# Patient Record
Sex: MALE | Race: WHITE | Employment: FULL TIME | URBAN - METROPOLITAN AREA
[De-identification: names, ages, dates, MRNs, and addresses within clinical notes are randomized per-mention and may not be internally consistent; named-entity substitution may affect disease eponyms.]

---

## 2017-03-06 LAB
ANTIBODY: NORMAL
HBA1C MFR BLD: 8 %

## 2017-03-13 ENCOUNTER — OFFICE VISIT (OUTPATIENT)
Dept: FAMILY MEDICINE CLINIC | Age: 57
End: 2017-03-13

## 2017-03-13 VITALS
TEMPERATURE: 97.8 F | WEIGHT: 306.2 LBS | RESPIRATION RATE: 20 BRPM | BODY MASS INDEX: 39.85 KG/M2 | DIASTOLIC BLOOD PRESSURE: 80 MMHG | HEART RATE: 64 BPM | SYSTOLIC BLOOD PRESSURE: 134 MMHG

## 2017-03-13 DIAGNOSIS — E11.9 TYPE 2 DIABETES MELLITUS WITHOUT COMPLICATION, UNSPECIFIED LONG TERM INSULIN USE STATUS: Primary | Chronic | ICD-10-CM

## 2017-03-13 DIAGNOSIS — Z12.11 SCREEN FOR COLON CANCER: ICD-10-CM

## 2017-03-13 DIAGNOSIS — I10 ESSENTIAL HYPERTENSION: ICD-10-CM

## 2017-03-13 DIAGNOSIS — Z12.5 SCREENING PSA (PROSTATE SPECIFIC ANTIGEN): ICD-10-CM

## 2017-03-13 DIAGNOSIS — E78.5 HYPERLIPIDEMIA, UNSPECIFIED HYPERLIPIDEMIA TYPE: Chronic | ICD-10-CM

## 2017-03-13 DIAGNOSIS — M54.6 ACUTE RIGHT-SIDED THORACIC BACK PAIN: ICD-10-CM

## 2017-03-13 PROCEDURE — G8417 CALC BMI ABV UP PARAM F/U: HCPCS | Performed by: FAMILY MEDICINE

## 2017-03-13 PROCEDURE — 99214 OFFICE O/P EST MOD 30 MIN: CPT | Performed by: FAMILY MEDICINE

## 2017-03-13 PROCEDURE — 3045F PR MOST RECENT HEMOGLOBIN A1C LEVEL 7.0-9.0%: CPT | Performed by: FAMILY MEDICINE

## 2017-03-13 PROCEDURE — G8427 DOCREV CUR MEDS BY ELIG CLIN: HCPCS | Performed by: FAMILY MEDICINE

## 2017-03-13 PROCEDURE — 1036F TOBACCO NON-USER: CPT | Performed by: FAMILY MEDICINE

## 2017-03-13 PROCEDURE — 3017F COLORECTAL CA SCREEN DOC REV: CPT | Performed by: FAMILY MEDICINE

## 2017-03-13 PROCEDURE — G8484 FLU IMMUNIZE NO ADMIN: HCPCS | Performed by: FAMILY MEDICINE

## 2017-03-13 ASSESSMENT — ENCOUNTER SYMPTOMS
COUGH: 0
ABDOMINAL PAIN: 0
SORE THROAT: 0
CONSTIPATION: 0
DIARRHEA: 0
SHORTNESS OF BREATH: 0
WHEEZING: 0
VOMITING: 0
NAUSEA: 0
RHINORRHEA: 0
BACK PAIN: 1

## 2017-04-11 RX ORDER — METFORMIN HYDROCHLORIDE 500 MG/1
1000 TABLET, EXTENDED RELEASE ORAL
Qty: 180 TABLET | Refills: 3 | Status: SHIPPED | OUTPATIENT
Start: 2017-04-11 | End: 2017-09-26 | Stop reason: SDUPTHER

## 2017-05-15 ENCOUNTER — OFFICE VISIT (OUTPATIENT)
Age: 57
End: 2017-05-15

## 2017-05-15 VITALS
HEIGHT: 74 IN | TEMPERATURE: 98.2 F | RESPIRATION RATE: 18 BRPM | OXYGEN SATURATION: 95 % | WEIGHT: 297.6 LBS | HEART RATE: 74 BPM | BODY MASS INDEX: 38.19 KG/M2 | DIASTOLIC BLOOD PRESSURE: 80 MMHG | SYSTOLIC BLOOD PRESSURE: 146 MMHG

## 2017-05-15 DIAGNOSIS — I10 ESSENTIAL HYPERTENSION: ICD-10-CM

## 2017-05-15 DIAGNOSIS — K63.89 COLONIC MASS: Primary | ICD-10-CM

## 2017-05-15 PROCEDURE — G8417 CALC BMI ABV UP PARAM F/U: HCPCS | Performed by: SURGERY

## 2017-05-15 PROCEDURE — 1036F TOBACCO NON-USER: CPT | Performed by: SURGERY

## 2017-05-15 PROCEDURE — 3017F COLORECTAL CA SCREEN DOC REV: CPT | Performed by: SURGERY

## 2017-05-15 PROCEDURE — 99244 OFF/OP CNSLTJ NEW/EST MOD 40: CPT | Performed by: SURGERY

## 2017-05-15 PROCEDURE — G8427 DOCREV CUR MEDS BY ELIG CLIN: HCPCS | Performed by: SURGERY

## 2017-05-15 RX ORDER — METRONIDAZOLE 500 MG/1
500 TABLET ORAL 3 TIMES DAILY
Qty: 3 TABLET | Refills: 0 | Status: SHIPPED | OUTPATIENT
Start: 2017-05-15 | End: 2017-05-16

## 2017-05-15 ASSESSMENT — ENCOUNTER SYMPTOMS
ABDOMINAL PAIN: 0
SORE THROAT: 0
CHOKING: 0
SHORTNESS OF BREATH: 0
SINUS PRESSURE: 0
COLOR CHANGE: 0
VOICE CHANGE: 0
DIARRHEA: 1
CHEST TIGHTNESS: 0
NAUSEA: 0
BACK PAIN: 0
ANAL BLEEDING: 0
BLOOD IN STOOL: 0
ABDOMINAL DISTENTION: 0
APNEA: 0
PHOTOPHOBIA: 0
EYE PAIN: 0
TROUBLE SWALLOWING: 0
WHEEZING: 0
EYE REDNESS: 0
EYE ITCHING: 0
RHINORRHEA: 0
EYE DISCHARGE: 0
COUGH: 0
FACIAL SWELLING: 0
CONSTIPATION: 1
RECTAL PAIN: 0
VOMITING: 0
STRIDOR: 0

## 2017-05-22 DIAGNOSIS — I10 ESSENTIAL HYPERTENSION: ICD-10-CM

## 2017-05-22 DIAGNOSIS — E11.9 TYPE 2 DIABETES MELLITUS WITHOUT COMPLICATION, UNSPECIFIED LONG TERM INSULIN USE STATUS: Primary | Chronic | ICD-10-CM

## 2017-05-22 DIAGNOSIS — E78.5 HYPERLIPIDEMIA, UNSPECIFIED HYPERLIPIDEMIA TYPE: Chronic | ICD-10-CM

## 2017-05-22 DIAGNOSIS — Z01.818 PRE-OP EXAM: ICD-10-CM

## 2017-05-22 RX ORDER — LINAGLIPTIN 5 MG/1
TABLET, FILM COATED ORAL
Qty: 90 TABLET | Refills: 3 | Status: SHIPPED | OUTPATIENT
Start: 2017-05-22 | End: 2018-05-20 | Stop reason: SDUPTHER

## 2017-06-07 ENCOUNTER — OFFICE VISIT (OUTPATIENT)
Age: 57
End: 2017-06-07

## 2017-06-07 VITALS
OXYGEN SATURATION: 95 % | RESPIRATION RATE: 18 BRPM | SYSTOLIC BLOOD PRESSURE: 124 MMHG | HEART RATE: 68 BPM | WEIGHT: 287.1 LBS | BODY MASS INDEX: 36.85 KG/M2 | TEMPERATURE: 98.2 F | HEIGHT: 74 IN | DIASTOLIC BLOOD PRESSURE: 70 MMHG

## 2017-06-07 DIAGNOSIS — Z90.49 S/P COLON RESECTION: Primary | ICD-10-CM

## 2017-06-07 PROCEDURE — 99024 POSTOP FOLLOW-UP VISIT: CPT | Performed by: NURSE PRACTITIONER

## 2017-06-07 ASSESSMENT — ENCOUNTER SYMPTOMS
EYE PAIN: 0
ABDOMINAL PAIN: 0
NAUSEA: 0
CONSTIPATION: 0
RECTAL PAIN: 0
SHORTNESS OF BREATH: 0
BLOOD IN STOOL: 0
FACIAL SWELLING: 0
SORE THROAT: 0
EYE REDNESS: 0
EYE DISCHARGE: 0
COUGH: 0
DIARRHEA: 0
TROUBLE SWALLOWING: 0
SINUS PRESSURE: 0
CHOKING: 0
PHOTOPHOBIA: 0
RHINORRHEA: 0
ABDOMINAL DISTENTION: 0
ANAL BLEEDING: 0
VOICE CHANGE: 0
WHEEZING: 0
APNEA: 0
CHEST TIGHTNESS: 0
EYE ITCHING: 0
VOMITING: 0
COLOR CHANGE: 0
BACK PAIN: 0
STRIDOR: 0

## 2017-06-22 LAB
A/G RATIO: 1.6 (ref 1.5–2.5)
ALBUMIN SERPL-MCNC: 4.4 GM/DL (ref 3.5–5)
ALP BLD-CCNC: 60 IU/L (ref 41–137)
ALT SERPL-CCNC: 37 IU/L (ref 10–40)
ANION GAP SERPL CALCULATED.3IONS-SCNC: 10 MMOL/L (ref 4–12)
AST SERPL-CCNC: 26 IU/L (ref 15–41)
BILIRUB SERPL-MCNC: 1 MG/DL (ref 0.2–1)
BUN BLDV-MCNC: 17 MG/DL (ref 7–20)
CALCIUM SERPL-MCNC: 9.7 MG/DL (ref 8.8–10.5)
CHLORIDE BLD-SCNC: 103 MEQ/L (ref 101–111)
CHOLESTEROL/HDL RELATIVE RISK: 3.6 (ref 4–5)
CHOLESTEROL: 125 MG/DL
CO2: 28 MEQ/L (ref 21–32)
CREAT SERPL-MCNC: 1.05 MG/DL (ref 0.7–1.3)
CREATININE CLEARANCE: >60
CREATININE, RANDOM URINE: 175.6 MG/DL
DIRECT-LDL / HDL RISK: 2.1
ESTIMATED AVERAGE GLUCOSE: 177 MG/DL
GLUCOSE: 196 MG/DL (ref 70–110)
HBA1C MFR BLD: 7.8 % (ref 4.4–6.4)
HDLC SERPL-MCNC: 34 MG/DL
LDL CHOLESTEROL DIRECT: 73 MG/DL
MICROALBUMIN UR-MCNC: 3.3 MG/L
MICROALBUMIN/CREAT UR-RTO: 1.9 MG/GM (ref 0–30)
POTASSIUM SERPL-SCNC: 3.9 MEQ/L (ref 3.6–5)
PROSTATE SPECIFIC ANTIGEN: 0.35 NG/ML
SODIUM BLD-SCNC: 141 MEQ/L (ref 135–145)
TOTAL PROTEIN: 7.1 G/DL (ref 6.2–8)
TRIGL SERPL-MCNC: 233 MG/DL
VLDLC SERPL CALC-MCNC: 46 MG/DL

## 2017-06-26 ENCOUNTER — OFFICE VISIT (OUTPATIENT)
Dept: FAMILY MEDICINE CLINIC | Age: 57
End: 2017-06-26

## 2017-06-26 VITALS
BODY MASS INDEX: 37.88 KG/M2 | HEART RATE: 64 BPM | DIASTOLIC BLOOD PRESSURE: 78 MMHG | RESPIRATION RATE: 16 BRPM | WEIGHT: 295 LBS | SYSTOLIC BLOOD PRESSURE: 126 MMHG

## 2017-06-26 DIAGNOSIS — I10 ESSENTIAL HYPERTENSION: ICD-10-CM

## 2017-06-26 DIAGNOSIS — E11.9 TYPE 2 DIABETES MELLITUS WITHOUT COMPLICATION, UNSPECIFIED LONG TERM INSULIN USE STATUS: Primary | Chronic | ICD-10-CM

## 2017-06-26 DIAGNOSIS — E78.5 HYPERLIPIDEMIA, UNSPECIFIED HYPERLIPIDEMIA TYPE: Chronic | ICD-10-CM

## 2017-06-26 PROCEDURE — G8427 DOCREV CUR MEDS BY ELIG CLIN: HCPCS | Performed by: FAMILY MEDICINE

## 2017-06-26 PROCEDURE — 3017F COLORECTAL CA SCREEN DOC REV: CPT | Performed by: FAMILY MEDICINE

## 2017-06-26 PROCEDURE — 99214 OFFICE O/P EST MOD 30 MIN: CPT | Performed by: FAMILY MEDICINE

## 2017-06-26 PROCEDURE — 1036F TOBACCO NON-USER: CPT | Performed by: FAMILY MEDICINE

## 2017-06-26 PROCEDURE — G8417 CALC BMI ABV UP PARAM F/U: HCPCS | Performed by: FAMILY MEDICINE

## 2017-06-26 PROCEDURE — 3046F HEMOGLOBIN A1C LEVEL >9.0%: CPT | Performed by: FAMILY MEDICINE

## 2017-06-26 RX ORDER — ATORVASTATIN CALCIUM 40 MG/1
TABLET, FILM COATED ORAL
Qty: 90 TABLET | Refills: 3 | Status: SHIPPED | OUTPATIENT
Start: 2017-06-26 | End: 2018-08-27 | Stop reason: SDUPTHER

## 2017-06-26 RX ORDER — LISINOPRIL 10 MG/1
TABLET ORAL
Qty: 90 TABLET | Refills: 3 | Status: SHIPPED | OUTPATIENT
Start: 2017-06-26 | End: 2018-08-30 | Stop reason: SDUPTHER

## 2017-06-26 RX ORDER — GLIMEPIRIDE 4 MG/1
TABLET ORAL
Qty: 180 TABLET | Refills: 3 | Status: SHIPPED | OUTPATIENT
Start: 2017-06-26 | End: 2018-06-04 | Stop reason: SDUPTHER

## 2017-06-26 RX ORDER — VARDENAFIL 11.85 MG/1
10 TABLET ORAL PRN
Qty: 18 TABLET | Refills: 3 | Status: SHIPPED | OUTPATIENT
Start: 2017-06-26 | End: 2017-09-26

## 2017-06-26 ASSESSMENT — ENCOUNTER SYMPTOMS
ABDOMINAL DISTENTION: 0
SINUS PRESSURE: 0
NAUSEA: 0
RHINORRHEA: 0
EYE PAIN: 0
SHORTNESS OF BREATH: 0
DIARRHEA: 0
ABDOMINAL PAIN: 0
COUGH: 0
CONSTIPATION: 0
SORE THROAT: 0

## 2017-06-28 ENCOUNTER — OFFICE VISIT (OUTPATIENT)
Age: 57
End: 2017-06-28

## 2017-06-28 VITALS
HEIGHT: 74 IN | OXYGEN SATURATION: 97 % | SYSTOLIC BLOOD PRESSURE: 126 MMHG | RESPIRATION RATE: 18 BRPM | TEMPERATURE: 97.4 F | DIASTOLIC BLOOD PRESSURE: 80 MMHG | HEART RATE: 66 BPM | WEIGHT: 294.7 LBS | BODY MASS INDEX: 37.82 KG/M2

## 2017-06-28 DIAGNOSIS — Z90.49 S/P COLON RESECTION: Primary | ICD-10-CM

## 2017-06-28 PROCEDURE — 99024 POSTOP FOLLOW-UP VISIT: CPT | Performed by: NURSE PRACTITIONER

## 2017-06-28 ASSESSMENT — ENCOUNTER SYMPTOMS
VOICE CHANGE: 0
VOMITING: 0
SINUS PRESSURE: 0
STRIDOR: 0
ABDOMINAL PAIN: 0
EYE PAIN: 0
BLOOD IN STOOL: 0
SHORTNESS OF BREATH: 0
DIARRHEA: 0
EYE DISCHARGE: 0
COUGH: 0
CONSTIPATION: 0
CHOKING: 0
BACK PAIN: 0
PHOTOPHOBIA: 0
ANAL BLEEDING: 0
EYE ITCHING: 0
COLOR CHANGE: 0
ABDOMINAL DISTENTION: 0
WHEEZING: 0
CHEST TIGHTNESS: 0
EYE REDNESS: 0
NAUSEA: 0
RECTAL PAIN: 0
FACIAL SWELLING: 0
RHINORRHEA: 0
TROUBLE SWALLOWING: 0
APNEA: 0
SORE THROAT: 0

## 2017-07-06 ENCOUNTER — TELEPHONE (OUTPATIENT)
Dept: FAMILY MEDICINE CLINIC | Age: 57
End: 2017-07-06

## 2017-09-21 LAB
A/G RATIO: 1.9 (ref 1.5–2.5)
ALBUMIN SERPL-MCNC: 4.4 GM/DL (ref 3.5–5)
ALP BLD-CCNC: 54 IU/L (ref 41–137)
ALT SERPL-CCNC: 43 IU/L (ref 10–40)
ANION GAP SERPL CALCULATED.3IONS-SCNC: 8 MMOL/L (ref 4–12)
AST SERPL-CCNC: 34 IU/L (ref 15–41)
BILIRUB SERPL-MCNC: 1.4 MG/DL (ref 0.2–1)
BUN BLDV-MCNC: 20 MG/DL (ref 7–20)
CALCIUM SERPL-MCNC: 9.1 MG/DL (ref 8.8–10.5)
CHLORIDE BLD-SCNC: 106 MEQ/L (ref 101–111)
CHOLESTEROL/HDL RELATIVE RISK: 3.6 (ref 4–5)
CHOLESTEROL: 123 MG/DL
CO2: 25 MEQ/L (ref 21–32)
CREAT SERPL-MCNC: 1 MG/DL (ref 0.7–1.3)
CREATININE CLEARANCE: >60
DIRECT-LDL / HDL RISK: 2.1
ESTIMATED AVERAGE GLUCOSE: 169 MG/DL
GLUCOSE: 169 MG/DL (ref 70–110)
HBA1C MFR BLD: 7.5 % (ref 4.4–6.4)
HDLC SERPL-MCNC: 34 MG/DL
LDL CHOLESTEROL DIRECT: 74 MG/DL
POTASSIUM SERPL-SCNC: 3.6 MEQ/L (ref 3.6–5)
SODIUM BLD-SCNC: 139 MEQ/L (ref 135–145)
TOTAL PROTEIN: 6.7 G/DL (ref 6.2–8)
TRIGL SERPL-MCNC: 168 MG/DL
VLDLC SERPL CALC-MCNC: 33 MG/DL

## 2017-09-26 ENCOUNTER — OFFICE VISIT (OUTPATIENT)
Dept: FAMILY MEDICINE CLINIC | Age: 57
End: 2017-09-26
Payer: COMMERCIAL

## 2017-09-26 VITALS
HEART RATE: 52 BPM | DIASTOLIC BLOOD PRESSURE: 70 MMHG | WEIGHT: 295.4 LBS | BODY MASS INDEX: 37.93 KG/M2 | TEMPERATURE: 98 F | RESPIRATION RATE: 18 BRPM | SYSTOLIC BLOOD PRESSURE: 126 MMHG

## 2017-09-26 DIAGNOSIS — I10 ESSENTIAL HYPERTENSION: ICD-10-CM

## 2017-09-26 DIAGNOSIS — E78.5 HYPERLIPIDEMIA, UNSPECIFIED HYPERLIPIDEMIA TYPE: Chronic | ICD-10-CM

## 2017-09-26 DIAGNOSIS — E11.9 TYPE 2 DIABETES MELLITUS WITHOUT COMPLICATION, UNSPECIFIED LONG TERM INSULIN USE STATUS: Primary | Chronic | ICD-10-CM

## 2017-09-26 PROCEDURE — G8427 DOCREV CUR MEDS BY ELIG CLIN: HCPCS | Performed by: FAMILY MEDICINE

## 2017-09-26 PROCEDURE — G8417 CALC BMI ABV UP PARAM F/U: HCPCS | Performed by: FAMILY MEDICINE

## 2017-09-26 PROCEDURE — 99214 OFFICE O/P EST MOD 30 MIN: CPT | Performed by: FAMILY MEDICINE

## 2017-09-26 PROCEDURE — 3017F COLORECTAL CA SCREEN DOC REV: CPT | Performed by: FAMILY MEDICINE

## 2017-09-26 PROCEDURE — 3046F HEMOGLOBIN A1C LEVEL >9.0%: CPT | Performed by: FAMILY MEDICINE

## 2017-09-26 PROCEDURE — 1036F TOBACCO NON-USER: CPT | Performed by: FAMILY MEDICINE

## 2017-09-26 RX ORDER — METFORMIN HYDROCHLORIDE 500 MG/1
1000 TABLET, EXTENDED RELEASE ORAL
Qty: 180 TABLET | Refills: 3 | Status: SHIPPED | OUTPATIENT
Start: 2017-09-26 | End: 2018-08-27 | Stop reason: SDUPTHER

## 2017-09-26 ASSESSMENT — PATIENT HEALTH QUESTIONNAIRE - PHQ9
SUM OF ALL RESPONSES TO PHQ QUESTIONS 1-9: 0
2. FEELING DOWN, DEPRESSED OR HOPELESS: 0
SUM OF ALL RESPONSES TO PHQ9 QUESTIONS 1 & 2: 0
1. LITTLE INTEREST OR PLEASURE IN DOING THINGS: 0

## 2017-09-29 ASSESSMENT — ENCOUNTER SYMPTOMS
RHINORRHEA: 0
NAUSEA: 0
DIARRHEA: 0
CONSTIPATION: 0
ABDOMINAL PAIN: 0
SHORTNESS OF BREATH: 0
SORE THROAT: 0
COUGH: 0
EYE PAIN: 0
SINUS PRESSURE: 0
ABDOMINAL DISTENTION: 0

## 2017-12-21 LAB
A/G RATIO: 1.8 (ref 1.5–2.5)
ALBUMIN SERPL-MCNC: 4.4 GM/DL (ref 3.5–5)
ALP BLD-CCNC: 58 IU/L (ref 41–137)
ALT SERPL-CCNC: 47 IU/L (ref 10–40)
ANION GAP SERPL CALCULATED.3IONS-SCNC: 9 MMOL/L (ref 4–12)
AST SERPL-CCNC: 39 IU/L (ref 15–41)
BILIRUB SERPL-MCNC: 0.7 MG/DL (ref 0.2–1)
BUN BLDV-MCNC: 21 MG/DL (ref 7–20)
CALCIUM SERPL-MCNC: 9.3 MG/DL (ref 8.8–10.5)
CHLORIDE BLD-SCNC: 104 MEQ/L (ref 101–111)
CO2: 25 MEQ/L (ref 21–32)
CREAT SERPL-MCNC: 0.99 MG/DL (ref 0.7–1.3)
CREATININE CLEARANCE: >60
ESTIMATED AVERAGE GLUCOSE: 206 MG/DL
GLUCOSE: 233 MG/DL (ref 70–110)
HBA1C MFR BLD: 8.8 % (ref 4.4–6.4)
POTASSIUM SERPL-SCNC: 3.9 MEQ/L (ref 3.6–5)
SODIUM BLD-SCNC: 138 MEQ/L (ref 135–145)
TOTAL PROTEIN: 6.9 G/DL (ref 6.2–8)

## 2017-12-28 ENCOUNTER — OFFICE VISIT (OUTPATIENT)
Dept: FAMILY MEDICINE CLINIC | Age: 57
End: 2017-12-28
Payer: COMMERCIAL

## 2017-12-28 VITALS
DIASTOLIC BLOOD PRESSURE: 86 MMHG | BODY MASS INDEX: 38.24 KG/M2 | RESPIRATION RATE: 16 BRPM | HEART RATE: 76 BPM | SYSTOLIC BLOOD PRESSURE: 146 MMHG | WEIGHT: 297.8 LBS

## 2017-12-28 DIAGNOSIS — E11.9 TYPE 2 DIABETES MELLITUS WITHOUT COMPLICATION, UNSPECIFIED LONG TERM INSULIN USE STATUS: Primary | Chronic | ICD-10-CM

## 2017-12-28 PROCEDURE — 1036F TOBACCO NON-USER: CPT | Performed by: FAMILY MEDICINE

## 2017-12-28 PROCEDURE — 3045F PR MOST RECENT HEMOGLOBIN A1C LEVEL 7.0-9.0%: CPT | Performed by: FAMILY MEDICINE

## 2017-12-28 PROCEDURE — 3017F COLORECTAL CA SCREEN DOC REV: CPT | Performed by: FAMILY MEDICINE

## 2017-12-28 PROCEDURE — G8417 CALC BMI ABV UP PARAM F/U: HCPCS | Performed by: FAMILY MEDICINE

## 2017-12-28 PROCEDURE — G8427 DOCREV CUR MEDS BY ELIG CLIN: HCPCS | Performed by: FAMILY MEDICINE

## 2017-12-28 PROCEDURE — 99214 OFFICE O/P EST MOD 30 MIN: CPT | Performed by: FAMILY MEDICINE

## 2017-12-28 PROCEDURE — G8484 FLU IMMUNIZE NO ADMIN: HCPCS | Performed by: FAMILY MEDICINE

## 2017-12-29 ASSESSMENT — ENCOUNTER SYMPTOMS
NAUSEA: 0
RHINORRHEA: 0
SINUS PRESSURE: 0
SORE THROAT: 0
EYE PAIN: 0
ABDOMINAL PAIN: 0
ABDOMINAL DISTENTION: 0
DIARRHEA: 0
SHORTNESS OF BREATH: 0
COUGH: 0
CONSTIPATION: 0

## 2017-12-29 NOTE — PROGRESS NOTES
Mother      breast, skin    Heart Disease Father     Stroke Father      Social History   Substance Use Topics    Smoking status: Former Smoker     Years: 12.00     Types: Cigarettes     Quit date: 1/1/2005    Smokeless tobacco: Never Used    Alcohol use Yes      Comment: RARELY      Current Outpatient Prescriptions   Medication Sig Dispense Refill    Insulin Degludec (TRESIBA FLEXTOUCH) 100 UNIT/ML SOPN Inject 10 Units into the skin daily 1 pen 2    metFORMIN (GLUCOPHAGE-XR) 500 MG extended release tablet Take 2 tablets by mouth daily (with breakfast) 180 tablet 3    glimepiride (AMARYL) 4 MG tablet TAKE 1 TABLET BY MOUTH TWICE A DAY WITH MEALS 180 tablet 3    atorvastatin (LIPITOR) 40 MG tablet TAKE 1 TABLET BY MOUTH DAILY 90 tablet 3    lisinopril (PRINIVIL;ZESTRIL) 10 MG tablet TAKE 1 TABLET BY MOUTH DAILY 90 tablet 3    TRADJENTA 5 MG tablet TAKE 1 TABLET BY MOUTH EVERY DAY 90 tablet 3    Insulin Pen Needle 31G X 6 MM MISC 1 each by Does not apply route daily 100 each 3    glucose blood VI test strips (ONE TOUCH ULTRA TEST) strip TEST TWO TIMES A  each 3    Liraglutide (VICTOZA) 18 MG/3ML SOPN SC injection Inject 1.8 mg into the skin daily 9 Pen 3    SOFT TOUCH LANCETS MISC 1 Cartridge by Does not apply route 2 times daily 100 each 3    aspirin EC 81 MG EC tablet Take 1 tablet by mouth daily. 30 tablet 3    Acai 25 MG CAPS Take  by mouth daily.  Bilberry 100 MG CAPS Take  by mouth daily.  Cinnamon 500 MG CAPS Take  by mouth daily.  GRAPE SEED 1 tablet by Does not apply route daily.  Krill Oil CAPS Take 1 tablet by mouth daily.  Calcium Carb-Cholecalciferol 600-1000 MG-UNIT CAPS Take 1 tablet by mouth daily.  therapeutic multivitamin-minerals (THERAGRAN-M) tablet Take 1 tablet by mouth daily. No current facility-administered medications for this visit.       Allergies   Allergen Reactions    Zithromax [Azithromycin Dihydrate] Anaphylaxis Throat swelling     Health Maintenance   Topic Date Due    HIV screen  06/01/1976    DTaP/Tdap/Td vaccine (1 - Tdap) 06/01/1980    Pneumococcal med risk (1 of 1 - PPSV23) 06/01/1980    Diabetic foot exam  05/12/2017    Diabetic retinal exam  09/22/2017    Flu vaccine (1) 03/30/2018 (Originally 9/1/2017)    Diabetic microalbuminuria test  06/22/2018    Lipid screen  09/21/2018    Diabetic hemoglobin A1C test  12/21/2018    Colon cancer screen colonoscopy  05/08/2027    Hepatitis C screen  Completed         Objective:     Physical Exam   Constitutional: He is oriented to person, place, and time. He appears well-developed and well-nourished. No distress. HENT:   Head: Normocephalic and atraumatic. Right Ear: External ear normal.   Left Ear: External ear normal.   Eyes: Conjunctivae are normal.   Neck: No JVD present. Cardiovascular: Normal rate, regular rhythm and normal heart sounds. Pulmonary/Chest: Effort normal and breath sounds normal. He has no wheezes. He has no rales. Musculoskeletal: He exhibits no edema or tenderness. Neurological: He is alert and oriented to person, place, and time. Skin: Skin is warm and dry. He is not diaphoretic. No pallor. BP (!) 146/86   Pulse 76   Resp 16   Wt 297 lb 12.8 oz (135.1 kg)   BMI 38.24 kg/m²       Impression/Plan:  1.  Type 2 diabetes mellitus without complication, unspecified long term insulin use status (HCC)      Requested Prescriptions     Signed Prescriptions Disp Refills    Insulin Degludec (TRESIBA FLEXTOUCH) 100 UNIT/ML SOPN 1 pen 2     Sig: Inject 10 Units into the skin daily     Orders Placed This Encounter   Procedures    Hemoglobin A1C     Standing Status:   Future     Standing Expiration Date:   12/28/2018    Comprehensive Metabolic Panel     Standing Status:   Future     Standing Expiration Date:   12/28/2018    Lipid Panel     Standing Status:   Future     Standing Expiration Date:   12/28/2018     Order Specific Question:

## 2018-03-20 RX ORDER — PEN NEEDLE, DIABETIC 31 GX5/16"
NEEDLE, DISPOSABLE MISCELLANEOUS
Qty: 100 EACH | Refills: 3 | Status: SHIPPED | OUTPATIENT
Start: 2018-03-20 | End: 2018-03-26 | Stop reason: SDUPTHER

## 2018-03-26 RX ORDER — LIRAGLUTIDE 6 MG/ML
1.8 INJECTION SUBCUTANEOUS DAILY
Qty: 27 PEN | Refills: 3 | Status: SHIPPED | OUTPATIENT
Start: 2018-03-26 | End: 2018-05-25 | Stop reason: SDUPTHER

## 2018-03-26 NOTE — TELEPHONE ENCOUNTER
Mikala Philip called requesting a refill on the following medications:  Requested Prescriptions     Pending Prescriptions Disp Refills    Insulin Pen Needle (B-D UF III MINI PEN NEEDLES) 31G X 5 MM MISC 100 each 3     Pharmacy verified:  [facility] [address]  . pv      Date of last visit: 12/28/2018  Date of next visit (if applicable): Visit date not found

## 2018-04-23 DIAGNOSIS — E11.9 TYPE 2 DIABETES MELLITUS WITHOUT COMPLICATION, UNSPECIFIED LONG TERM INSULIN USE STATUS: Chronic | ICD-10-CM

## 2018-05-21 RX ORDER — LINAGLIPTIN 5 MG/1
TABLET, FILM COATED ORAL
Qty: 90 TABLET | Refills: 1 | Status: SHIPPED | OUTPATIENT
Start: 2018-05-21 | End: 2018-11-19 | Stop reason: SDUPTHER

## 2018-05-21 RX ORDER — LIRAGLUTIDE 6 MG/ML
1.8 INJECTION SUBCUTANEOUS DAILY
Refills: 3 | OUTPATIENT
Start: 2018-05-21

## 2018-05-31 LAB
A/G RATIO: 2.1 (ref 1.5–2.5)
ALBUMIN SERPL-MCNC: 4.7 GM/DL (ref 3.5–5)
ALP BLD-CCNC: 58 IU/L (ref 41–137)
ALT SERPL-CCNC: 45 IU/L (ref 10–40)
ANION GAP SERPL CALCULATED.3IONS-SCNC: 10 MMOL/L (ref 4–12)
AST SERPL-CCNC: 33 IU/L (ref 15–41)
BILIRUB SERPL-MCNC: 1.2 MG/DL (ref 0.2–1)
BUN BLDV-MCNC: 23 MG/DL (ref 7–20)
CALCIUM SERPL-MCNC: 8.8 MG/DL (ref 8.8–10.5)
CHLORIDE BLD-SCNC: 100 MEQ/L (ref 101–111)
CHOLESTEROL/HDL RELATIVE RISK: 3.4 (ref 4–5)
CHOLESTEROL: 129 MG/DL
CO2: 25 MEQ/L (ref 21–32)
CREAT SERPL-MCNC: 1.05 MG/DL (ref 0.7–1.3)
CREATININE CLEARANCE: >60
DIRECT-LDL / HDL RISK: 1.9
ESTIMATED AVERAGE GLUCOSE: 194 MG/DL
GLUCOSE: 224 MG/DL (ref 70–110)
HBA1C MFR BLD: 8.4 % (ref 4.4–6.4)
HDLC SERPL-MCNC: 37 MG/DL
LDL CHOLESTEROL DIRECT: 71 MG/DL
POTASSIUM SERPL-SCNC: 3.4 MEQ/L (ref 3.6–5)
SODIUM BLD-SCNC: 135 MEQ/L (ref 135–145)
TOTAL PROTEIN: 6.9 G/DL (ref 6.2–8)
TRIGL SERPL-MCNC: 222 MG/DL
VLDLC SERPL CALC-MCNC: 44 MG/DL

## 2018-06-04 ENCOUNTER — OFFICE VISIT (OUTPATIENT)
Dept: FAMILY MEDICINE CLINIC | Age: 58
End: 2018-06-04
Payer: COMMERCIAL

## 2018-06-04 VITALS
WEIGHT: 295.6 LBS | HEART RATE: 56 BPM | DIASTOLIC BLOOD PRESSURE: 88 MMHG | RESPIRATION RATE: 18 BRPM | BODY MASS INDEX: 37.94 KG/M2 | TEMPERATURE: 97.6 F | HEIGHT: 74 IN | SYSTOLIC BLOOD PRESSURE: 136 MMHG

## 2018-06-04 DIAGNOSIS — I10 ESSENTIAL HYPERTENSION: ICD-10-CM

## 2018-06-04 DIAGNOSIS — E11.9 TYPE 2 DIABETES MELLITUS WITHOUT COMPLICATION, UNSPECIFIED LONG TERM INSULIN USE STATUS: Chronic | ICD-10-CM

## 2018-06-04 DIAGNOSIS — E78.5 HYPERLIPIDEMIA, UNSPECIFIED HYPERLIPIDEMIA TYPE: Primary | Chronic | ICD-10-CM

## 2018-06-04 PROCEDURE — G8427 DOCREV CUR MEDS BY ELIG CLIN: HCPCS | Performed by: FAMILY MEDICINE

## 2018-06-04 PROCEDURE — 1036F TOBACCO NON-USER: CPT | Performed by: FAMILY MEDICINE

## 2018-06-04 PROCEDURE — 3017F COLORECTAL CA SCREEN DOC REV: CPT | Performed by: FAMILY MEDICINE

## 2018-06-04 PROCEDURE — 3045F PR MOST RECENT HEMOGLOBIN A1C LEVEL 7.0-9.0%: CPT | Performed by: FAMILY MEDICINE

## 2018-06-04 PROCEDURE — G8417 CALC BMI ABV UP PARAM F/U: HCPCS | Performed by: FAMILY MEDICINE

## 2018-06-04 PROCEDURE — 99214 OFFICE O/P EST MOD 30 MIN: CPT | Performed by: FAMILY MEDICINE

## 2018-06-04 PROCEDURE — 2022F DILAT RTA XM EVC RTNOPTHY: CPT | Performed by: FAMILY MEDICINE

## 2018-06-04 RX ORDER — GLIMEPIRIDE 4 MG/1
TABLET ORAL
Qty: 180 TABLET | Refills: 3 | Status: SHIPPED | OUTPATIENT
Start: 2018-06-04 | End: 2018-08-27 | Stop reason: SDUPTHER

## 2018-06-04 ASSESSMENT — ENCOUNTER SYMPTOMS
SHORTNESS OF BREATH: 0
DIARRHEA: 0
EYE PAIN: 0
RHINORRHEA: 0
CONSTIPATION: 0
NAUSEA: 0
SORE THROAT: 0
ABDOMINAL PAIN: 0
ABDOMINAL DISTENTION: 0
COUGH: 0
SINUS PRESSURE: 0

## 2018-08-27 RX ORDER — METFORMIN HYDROCHLORIDE 500 MG/1
1000 TABLET, EXTENDED RELEASE ORAL
Qty: 180 TABLET | Refills: 3 | Status: SHIPPED | OUTPATIENT
Start: 2018-08-27 | End: 2019-09-04 | Stop reason: SDUPTHER

## 2018-08-27 RX ORDER — ATORVASTATIN CALCIUM 40 MG/1
TABLET, FILM COATED ORAL
Qty: 90 TABLET | Refills: 3 | Status: SHIPPED | OUTPATIENT
Start: 2018-08-27 | End: 2019-09-04 | Stop reason: SDUPTHER

## 2018-08-27 RX ORDER — GLIMEPIRIDE 4 MG/1
TABLET ORAL
Qty: 180 TABLET | Refills: 3 | Status: SHIPPED | OUTPATIENT
Start: 2018-08-27 | End: 2019-07-16 | Stop reason: SDUPTHER

## 2018-08-30 LAB
A/G RATIO: 1.7 (ref 1.5–2.5)
ALBUMIN SERPL-MCNC: 4.4 GM/DL (ref 3.5–5)
ALP BLD-CCNC: 65 IU/L (ref 41–137)
ALT SERPL-CCNC: 45 IU/L (ref 10–40)
ANION GAP SERPL CALCULATED.3IONS-SCNC: 9 MMOL/L (ref 4–12)
AST SERPL-CCNC: 33 IU/L (ref 15–41)
BILIRUB SERPL-MCNC: 1.3 MG/DL (ref 0.2–1)
BUN BLDV-MCNC: 22 MG/DL (ref 7–20)
CALCIUM SERPL-MCNC: 9.2 MG/DL (ref 8.8–10.5)
CHLORIDE BLD-SCNC: 104 MEQ/L (ref 101–111)
CO2: 26 MEQ/L (ref 21–32)
CREAT SERPL-MCNC: 1 MG/DL (ref 0.7–1.3)
CREATININE CLEARANCE: >60
ESTIMATED AVERAGE GLUCOSE: 200 MG/DL
GLUCOSE: 177 MG/DL (ref 70–110)
HBA1C MFR BLD: 8.6 % (ref 4.4–6.4)
POTASSIUM SERPL-SCNC: 3.8 MEQ/L (ref 3.6–5)
SODIUM BLD-SCNC: 139 MEQ/L (ref 135–145)
TOTAL PROTEIN: 7 G/DL (ref 6.2–8)

## 2018-08-30 RX ORDER — LISINOPRIL 10 MG/1
TABLET ORAL
Qty: 90 TABLET | Refills: 3 | Status: SHIPPED | OUTPATIENT
Start: 2018-08-30 | End: 2019-09-04 | Stop reason: SDUPTHER

## 2018-09-06 ENCOUNTER — OFFICE VISIT (OUTPATIENT)
Dept: FAMILY MEDICINE CLINIC | Age: 58
End: 2018-09-06
Payer: COMMERCIAL

## 2018-09-06 VITALS
DIASTOLIC BLOOD PRESSURE: 64 MMHG | SYSTOLIC BLOOD PRESSURE: 136 MMHG | HEART RATE: 66 BPM | RESPIRATION RATE: 18 BRPM | BODY MASS INDEX: 37.77 KG/M2 | WEIGHT: 294.2 LBS

## 2018-09-06 DIAGNOSIS — E78.5 HYPERLIPIDEMIA, UNSPECIFIED HYPERLIPIDEMIA TYPE: Chronic | ICD-10-CM

## 2018-09-06 DIAGNOSIS — I10 ESSENTIAL HYPERTENSION: ICD-10-CM

## 2018-09-06 DIAGNOSIS — Z12.5 SCREENING PSA (PROSTATE SPECIFIC ANTIGEN): ICD-10-CM

## 2018-09-06 DIAGNOSIS — E11.9 TYPE 2 DIABETES MELLITUS WITHOUT COMPLICATION, UNSPECIFIED WHETHER LONG TERM INSULIN USE (HCC): Primary | Chronic | ICD-10-CM

## 2018-09-06 PROCEDURE — 2022F DILAT RTA XM EVC RTNOPTHY: CPT | Performed by: FAMILY MEDICINE

## 2018-09-06 PROCEDURE — G8427 DOCREV CUR MEDS BY ELIG CLIN: HCPCS | Performed by: FAMILY MEDICINE

## 2018-09-06 PROCEDURE — 99213 OFFICE O/P EST LOW 20 MIN: CPT | Performed by: FAMILY MEDICINE

## 2018-09-06 PROCEDURE — G8417 CALC BMI ABV UP PARAM F/U: HCPCS | Performed by: FAMILY MEDICINE

## 2018-09-06 PROCEDURE — 3017F COLORECTAL CA SCREEN DOC REV: CPT | Performed by: FAMILY MEDICINE

## 2018-09-06 PROCEDURE — 3045F PR MOST RECENT HEMOGLOBIN A1C LEVEL 7.0-9.0%: CPT | Performed by: FAMILY MEDICINE

## 2018-09-06 PROCEDURE — 1036F TOBACCO NON-USER: CPT | Performed by: FAMILY MEDICINE

## 2018-09-08 ASSESSMENT — ENCOUNTER SYMPTOMS
RHINORRHEA: 0
DIARRHEA: 0
CONSTIPATION: 0
SORE THROAT: 0
ABDOMINAL PAIN: 0
EYE PAIN: 0
SHORTNESS OF BREATH: 0
NAUSEA: 0
SINUS PRESSURE: 0
ABDOMINAL DISTENTION: 0
COUGH: 0

## 2018-09-08 NOTE — PROGRESS NOTES
breast, skin    Heart Disease Father     Stroke Father      Social History   Substance Use Topics    Smoking status: Former Smoker     Years: 12.00     Types: Cigarettes     Quit date: 1/1/2005    Smokeless tobacco: Never Used    Alcohol use Yes      Comment: RARELY      Current Outpatient Prescriptions   Medication Sig Dispense Refill    Insulin Degludec (TRESIBA FLEXTOUCH) 100 UNIT/ML SOPN Inject 30 Units into the skin daily 3 pen 5    lisinopril (PRINIVIL;ZESTRIL) 10 MG tablet TAKE 1 TABLET BY MOUTH DAILY 90 tablet 3    metFORMIN (GLUCOPHAGE-XR) 500 MG extended release tablet Take 2 tablets by mouth daily (with breakfast) 180 tablet 3    atorvastatin (LIPITOR) 40 MG tablet TAKE 1 TABLET BY MOUTH DAILY 90 tablet 3    glimepiride (AMARYL) 4 MG tablet TAKE 1 TABLET BY MOUTH TWICE A DAY WITH MEALS 180 tablet 3    Insulin Pen Needle (B-D UF III MINI PEN NEEDLES) 31G X 5 MM MISC USE 1 ONCE DAILY and as needed Dx: E11.9 200 each 3    Liraglutide (VICTOZA) 18 MG/3ML SOPN SC injection Inject 1.8 mg into the skin daily 27 pen 3    nystatin-triamcinolone (MYCOLOG II) 466265-0.1 UNIT/GM-% cream Apply topically 2 times daily. 60 g 5    TRADJENTA 5 MG tablet TAKE 1 TABLET BY MOUTH EVERY DAY 90 tablet 1    glucose blood VI test strips (ONE TOUCH ULTRA TEST) strip TEST TWO TIMES A  each 3    SOFT TOUCH LANCETS MISC 1 Cartridge by Does not apply route 2 times daily 100 each 3    aspirin EC 81 MG EC tablet Take 1 tablet by mouth daily. 30 tablet 3    Acai 25 MG CAPS Take  by mouth daily.  Bilberry 100 MG CAPS Take  by mouth daily.  Krill Oil CAPS Take 1 tablet by mouth daily.  Calcium Carb-Cholecalciferol 600-1000 MG-UNIT CAPS Take 1 tablet by mouth daily.  therapeutic multivitamin-minerals (THERAGRAN-M) tablet Take 1 tablet by mouth daily. No current facility-administered medications for this visit.       Allergies   Allergen Reactions    Zithromax [Azithromycin Dihydrate] Anaphylaxis     Throat swelling     Health Maintenance   Topic Date Due    HIV screen  06/01/1976    DTaP/Tdap/Td vaccine (1 - Tdap) 06/01/1980    Pneumococcal med risk (1 of 1 - PPSV23) 06/01/1980    Shingles Vaccine (1 of 2 - 2 Dose Series) 06/01/2011    Diabetic foot exam  05/12/2017    Diabetic retinal exam  09/22/2017    Diabetic microalbuminuria test  06/22/2018    Flu vaccine (1) 09/01/2018    Lipid screen  05/31/2019    A1C test (Diabetic or Prediabetic)  08/30/2019    Potassium monitoring  08/30/2019    Creatinine monitoring  08/30/2019    Colon cancer screen colonoscopy  12/11/2027    Hepatitis C screen  Completed         Objective:     Physical Exam   Constitutional: He is oriented to person, place, and time. He appears well-developed and well-nourished. No distress. HENT:   Head: Normocephalic and atraumatic. Right Ear: External ear normal.   Left Ear: External ear normal.   Eyes: Conjunctivae are normal.   Neck: No JVD present. Cardiovascular: Normal rate, regular rhythm and normal heart sounds. Pulmonary/Chest: Effort normal and breath sounds normal. He has no wheezes. He has no rales. Musculoskeletal: He exhibits no edema or tenderness. Neurological: He is alert and oriented to person, place, and time. Skin: Skin is warm and dry. He is not diaphoretic. No pallor. /64   Pulse 66   Resp 18   Wt 294 lb 3.2 oz (133.4 kg)   BMI 37.77 kg/m²       Impression/Plan:  1. Type 2 diabetes mellitus without complication, unspecified whether long term insulin use ([facility] [address]    2. Essential hypertension    3. Hyperlipidemia, unspecified hyperlipidemia type    4.  Screening PSA (prostate specific antigen)      Requested Prescriptions     Signed Prescriptions Disp Refills    Insulin Degludec (TRESIBA FLEXTOUCH) 100 UNIT/ML SOPN 3 pen 5     Sig: Inject 30 Units into the skin daily     Orders Placed This Encounter   Procedures    Hemoglobin A1C     Standing Status:   Future Standing Expiration Date:   9/6/2019    Comprehensive Metabolic Panel     Standing Status:   Future     Standing Expiration Date:   9/6/2019    Lipid Panel     Standing Status:   Future     Standing Expiration Date:   9/6/2019     Order Specific Question:   Is Patient Fasting?/# of Hours     Answer:   yes/12    Microalbumin / Creatinine Urine Ratio     Standing Status:   Future     Standing Expiration Date:   9/6/2019    Psa screening     Standing Status:   Future     Standing Expiration Date:   9/6/2019       Patient given educational materials - see patient instructions. Discussed use, benefit, and side effects of prescribed medications. All patient questions answered. Pt voiced understanding. Reviewed health maintenance. Patient agreed with treatment plan. Follow up as directed. **This report has been created using voice recognition software. It may contain minor errors which are inherent in voice recognition technology. **       Electronically signed by Bell Murphy MD on 9/8/2018 at 4:15 PM

## 2018-11-14 NOTE — TELEPHONE ENCOUNTER
Faxed Rx request from [facility] for Dionisio Sands Flextouch 100unit/mL 30 units QD  Last written:09/06/2018 3 pens with 5 refills  Last seen:09/06/2018, Next appointment:12/06/2018  Rx verified,ordered,and set to escribe

## 2018-11-19 LAB
A/G RATIO: 1.6 (ref 1.5–2.5)
ALBUMIN SERPL-MCNC: 4.5 GM/DL (ref 3.5–5)
ALP BLD-CCNC: 72 IU/L (ref 41–137)
ALT SERPL-CCNC: 49 IU/L (ref 10–40)
ANION GAP SERPL CALCULATED.3IONS-SCNC: 9 MMOL/L (ref 4–12)
AST SERPL-CCNC: 37 IU/L (ref 15–41)
BILIRUB SERPL-MCNC: 1.1 MG/DL (ref 0.2–1)
BUN BLDV-MCNC: 20 MG/DL (ref 7–20)
CALCIUM SERPL-MCNC: 9.3 MG/DL (ref 8.8–10.5)
CHLORIDE BLD-SCNC: 102 MEQ/L (ref 101–111)
CHOLESTEROL/HDL RELATIVE RISK: 3.9 (ref 4–5)
CHOLESTEROL: 157 MG/DL
CO2: 29 MEQ/L (ref 21–32)
CREAT SERPL-MCNC: 1.02 MG/DL (ref 0.7–1.3)
CREATININE CLEARANCE: >60
CREATININE, RANDOM URINE: 198.9 MG/DL
DIRECT-LDL / HDL RISK: 2.3
GLUCOSE: 238 MG/DL (ref 70–110)
HDLC SERPL-MCNC: 40 MG/DL
LDL CHOLESTEROL DIRECT: 94 MG/DL
MICROALBUMIN UR-MCNC: 8.8 MG/L
MICROALBUMIN/CREAT UR-RTO: 4.4 MG/GM (ref 0–30)
POTASSIUM SERPL-SCNC: 4.3 MEQ/L (ref 3.6–5)
PROSTATE SPECIFIC ANTIGEN: 0.35 NG/ML
SODIUM BLD-SCNC: 140 MEQ/L (ref 135–145)
TOTAL PROTEIN: 7.3 G/DL (ref 6.2–8)
TRIGL SERPL-MCNC: 229 MG/DL
VLDLC SERPL CALC-MCNC: 45 MG/DL

## 2018-12-03 LAB
ESTIMATED AVERAGE GLUCOSE: 237 MG/DL
HBA1C MFR BLD: 9.9 % (ref 4.4–6.4)

## 2018-12-06 ENCOUNTER — OFFICE VISIT (OUTPATIENT)
Dept: FAMILY MEDICINE CLINIC | Age: 58
End: 2018-12-06
Payer: COMMERCIAL

## 2018-12-06 DIAGNOSIS — E78.5 HYPERLIPIDEMIA, UNSPECIFIED HYPERLIPIDEMIA TYPE: Chronic | ICD-10-CM

## 2018-12-06 DIAGNOSIS — E11.9 TYPE 2 DIABETES MELLITUS WITHOUT COMPLICATION, UNSPECIFIED WHETHER LONG TERM INSULIN USE (HCC): Primary | Chronic | ICD-10-CM

## 2018-12-06 DIAGNOSIS — I10 ESSENTIAL HYPERTENSION: ICD-10-CM

## 2018-12-06 PROCEDURE — G8427 DOCREV CUR MEDS BY ELIG CLIN: HCPCS | Performed by: FAMILY MEDICINE

## 2018-12-06 PROCEDURE — G8417 CALC BMI ABV UP PARAM F/U: HCPCS | Performed by: FAMILY MEDICINE

## 2018-12-06 PROCEDURE — G8484 FLU IMMUNIZE NO ADMIN: HCPCS | Performed by: FAMILY MEDICINE

## 2018-12-06 PROCEDURE — 1036F TOBACCO NON-USER: CPT | Performed by: FAMILY MEDICINE

## 2018-12-06 PROCEDURE — 3046F HEMOGLOBIN A1C LEVEL >9.0%: CPT | Performed by: FAMILY MEDICINE

## 2018-12-06 PROCEDURE — 99214 OFFICE O/P EST MOD 30 MIN: CPT | Performed by: FAMILY MEDICINE

## 2018-12-06 PROCEDURE — 3017F COLORECTAL CA SCREEN DOC REV: CPT | Performed by: FAMILY MEDICINE

## 2018-12-06 PROCEDURE — 2022F DILAT RTA XM EVC RTNOPTHY: CPT | Performed by: FAMILY MEDICINE

## 2018-12-06 ASSESSMENT — PATIENT HEALTH QUESTIONNAIRE - PHQ9
SUM OF ALL RESPONSES TO PHQ QUESTIONS 1-9: 0
SUM OF ALL RESPONSES TO PHQ9 QUESTIONS 1 & 2: 0
SUM OF ALL RESPONSES TO PHQ QUESTIONS 1-9: 0
1. LITTLE INTEREST OR PLEASURE IN DOING THINGS: 0
2. FEELING DOWN, DEPRESSED OR HOPELESS: 0

## 2018-12-09 VITALS
OXYGEN SATURATION: 99 % | HEART RATE: 58 BPM | DIASTOLIC BLOOD PRESSURE: 86 MMHG | BODY MASS INDEX: 38.52 KG/M2 | SYSTOLIC BLOOD PRESSURE: 138 MMHG | WEIGHT: 300 LBS | RESPIRATION RATE: 20 BRPM | TEMPERATURE: 97.5 F

## 2018-12-09 ASSESSMENT — ENCOUNTER SYMPTOMS
VOMITING: 0
DIARRHEA: 0
NAUSEA: 0
CONSTIPATION: 0
WHEEZING: 0
SHORTNESS OF BREATH: 0
BACK PAIN: 0
COUGH: 0
ABDOMINAL PAIN: 0
SORE THROAT: 0
RHINORRHEA: 0

## 2018-12-09 NOTE — PROGRESS NOTES
breast, skin    Heart Disease Father     Stroke Father      Social History   Substance Use Topics    Smoking status: Former Smoker     Years: 12.00     Types: Cigarettes     Quit date: 1/1/2005    Smokeless tobacco: Never Used    Alcohol use Yes      Comment: RARELY      Current Outpatient Prescriptions   Medication Sig Dispense Refill    Insulin Degludec (TRESIBA FLEXTOUCH) 100 UNIT/ML SOPN Inject 40 Units into the skin daily 6 pen 5    linagliptin (TRADJENTA) 5 MG tablet TAKE 1 TABLET BY MOUTH EVERY DAY 90 tablet 1    lisinopril (PRINIVIL;ZESTRIL) 10 MG tablet TAKE 1 TABLET BY MOUTH DAILY 90 tablet 3    metFORMIN (GLUCOPHAGE-XR) 500 MG extended release tablet Take 2 tablets by mouth daily (with breakfast) 180 tablet 3    atorvastatin (LIPITOR) 40 MG tablet TAKE 1 TABLET BY MOUTH DAILY 90 tablet 3    glimepiride (AMARYL) 4 MG tablet TAKE 1 TABLET BY MOUTH TWICE A DAY WITH MEALS 180 tablet 3    Insulin Pen Needle (B-D UF III MINI PEN NEEDLES) 31G X 5 MM MISC USE 1 ONCE DAILY and as needed Dx: E11.9 200 each 3    Liraglutide (VICTOZA) 18 MG/3ML SOPN SC injection Inject 1.8 mg into the skin daily 27 pen 3    nystatin-triamcinolone (MYCOLOG II) 523079-3.1 UNIT/GM-% cream Apply topically 2 times daily. 60 g 5    glucose blood VI test strips (ONE TOUCH ULTRA TEST) strip TEST TWO TIMES A  each 3    SOFT TOUCH LANCETS MISC 1 Cartridge by Does not apply route 2 times daily 100 each 3    aspirin EC 81 MG EC tablet Take 1 tablet by mouth daily. 30 tablet 3    Acai 25 MG CAPS Take  by mouth daily.  Bilberry 100 MG CAPS Take  by mouth daily.  Krill Oil CAPS Take 1 tablet by mouth daily.  Calcium Carb-Cholecalciferol 600-1000 MG-UNIT CAPS Take 1 tablet by mouth daily.  therapeutic multivitamin-minerals (THERAGRAN-M) tablet Take 1 tablet by mouth daily. No current facility-administered medications for this visit.       Allergies   Allergen Reactions    Zithromax

## 2019-02-27 DIAGNOSIS — E11.9 TYPE 2 DIABETES MELLITUS WITHOUT COMPLICATION, UNSPECIFIED WHETHER LONG TERM INSULIN USE (HCC): Primary | Chronic | ICD-10-CM

## 2019-03-04 LAB
A/G RATIO: 1.4 (ref 1.5–2.5)
ALBUMIN SERPL-MCNC: 4.4 GM/DL (ref 3.5–5)
ALP BLD-CCNC: 65 IU/L (ref 41–137)
ALT SERPL-CCNC: 36 IU/L (ref 10–40)
ANION GAP SERPL CALCULATED.3IONS-SCNC: 10 MMOL/L (ref 4–12)
AST SERPL-CCNC: 36 IU/L (ref 15–41)
BILIRUB SERPL-MCNC: 1.5 MG/DL (ref 0.2–1)
BUN BLDV-MCNC: 23 MG/DL (ref 7–20)
CALCIUM SERPL-MCNC: 9.3 MG/DL (ref 8.8–10.5)
CHLORIDE BLD-SCNC: 102 MEQ/L (ref 101–111)
CO2: 26 MEQ/L (ref 21–32)
CREAT SERPL-MCNC: 0.95 MG/DL (ref 0.7–1.3)
CREATININE CLEARANCE: >60
ESTIMATED AVERAGE GLUCOSE: 217 MG/DL
GLUCOSE: 175 MG/DL (ref 70–110)
HBA1C MFR BLD: 9.2 % (ref 4.4–6.4)
POTASSIUM SERPL-SCNC: 4 MEQ/L (ref 3.6–5)
SODIUM BLD-SCNC: 138 MEQ/L (ref 135–145)
TOTAL PROTEIN: 7.6 G/DL (ref 6.2–8)

## 2019-03-07 ENCOUNTER — OFFICE VISIT (OUTPATIENT)
Dept: FAMILY MEDICINE CLINIC | Age: 59
End: 2019-03-07
Payer: COMMERCIAL

## 2019-03-07 VITALS
HEART RATE: 60 BPM | BODY MASS INDEX: 38.52 KG/M2 | RESPIRATION RATE: 20 BRPM | WEIGHT: 300 LBS | DIASTOLIC BLOOD PRESSURE: 96 MMHG | SYSTOLIC BLOOD PRESSURE: 144 MMHG

## 2019-03-07 DIAGNOSIS — E11.9 TYPE 2 DIABETES MELLITUS WITHOUT COMPLICATION, UNSPECIFIED WHETHER LONG TERM INSULIN USE (HCC): Primary | Chronic | ICD-10-CM

## 2019-03-07 DIAGNOSIS — E78.5 HYPERLIPIDEMIA, UNSPECIFIED HYPERLIPIDEMIA TYPE: Chronic | ICD-10-CM

## 2019-03-07 DIAGNOSIS — I10 ESSENTIAL HYPERTENSION: ICD-10-CM

## 2019-03-07 PROCEDURE — G8484 FLU IMMUNIZE NO ADMIN: HCPCS | Performed by: FAMILY MEDICINE

## 2019-03-07 PROCEDURE — 2022F DILAT RTA XM EVC RTNOPTHY: CPT | Performed by: FAMILY MEDICINE

## 2019-03-07 PROCEDURE — 3046F HEMOGLOBIN A1C LEVEL >9.0%: CPT | Performed by: FAMILY MEDICINE

## 2019-03-07 PROCEDURE — 99214 OFFICE O/P EST MOD 30 MIN: CPT | Performed by: FAMILY MEDICINE

## 2019-03-07 PROCEDURE — 1036F TOBACCO NON-USER: CPT | Performed by: FAMILY MEDICINE

## 2019-03-07 PROCEDURE — G8427 DOCREV CUR MEDS BY ELIG CLIN: HCPCS | Performed by: FAMILY MEDICINE

## 2019-03-07 PROCEDURE — G8417 CALC BMI ABV UP PARAM F/U: HCPCS | Performed by: FAMILY MEDICINE

## 2019-03-07 PROCEDURE — 3017F COLORECTAL CA SCREEN DOC REV: CPT | Performed by: FAMILY MEDICINE

## 2019-03-07 ASSESSMENT — PATIENT HEALTH QUESTIONNAIRE - PHQ9
2. FEELING DOWN, DEPRESSED OR HOPELESS: 0
SUM OF ALL RESPONSES TO PHQ QUESTIONS 1-9: 0
1. LITTLE INTEREST OR PLEASURE IN DOING THINGS: 0
SUM OF ALL RESPONSES TO PHQ9 QUESTIONS 1 & 2: 0
SUM OF ALL RESPONSES TO PHQ QUESTIONS 1-9: 0

## 2019-03-08 ASSESSMENT — ENCOUNTER SYMPTOMS
COUGH: 0
EYE PAIN: 0
DIARRHEA: 0
ABDOMINAL DISTENTION: 0
SINUS PRESSURE: 0
SORE THROAT: 0
NAUSEA: 0
ABDOMINAL PAIN: 0
CONSTIPATION: 0
SHORTNESS OF BREATH: 0
RHINORRHEA: 0

## 2019-05-28 ENCOUNTER — TELEPHONE (OUTPATIENT)
Dept: FAMILY MEDICINE CLINIC | Age: 59
End: 2019-05-28

## 2019-06-28 RX ORDER — LIRAGLUTIDE 6 MG/ML
INJECTION SUBCUTANEOUS
Qty: 27 PEN | Refills: 2 | Status: SHIPPED | OUTPATIENT
Start: 2019-06-28 | End: 2019-07-16 | Stop reason: SDUPTHER

## 2019-07-08 LAB
A/G RATIO: 1.4 (ref 1.5–2.5)
ALBUMIN SERPL-MCNC: 4 GM/DL (ref 3.5–5)
ALP BLD-CCNC: 63 IU/L (ref 41–137)
ALT SERPL-CCNC: 34 IU/L (ref 10–40)
ANION GAP SERPL CALCULATED.3IONS-SCNC: 8 MMOL/L (ref 4–12)
AST SERPL-CCNC: 33 IU/L (ref 15–41)
BILIRUB SERPL-MCNC: 0.7 MG/DL (ref 0.2–1)
BUN BLDV-MCNC: 19 MG/DL (ref 7–20)
CALCIUM SERPL-MCNC: 8.9 MG/DL (ref 8.8–10.5)
CHLORIDE BLD-SCNC: 106 MEQ/L (ref 101–111)
CHOLESTEROL/HDL RELATIVE RISK: 4.4 (ref 4–5)
CHOLESTEROL: 130 MG/DL
CO2: 27 MEQ/L (ref 21–32)
CREAT SERPL-MCNC: 1.1 MG/DL (ref 0.6–1.3)
CREATININE CLEARANCE: >60
DIRECT-LDL / HDL RISK: 1.8
ESTIMATED AVERAGE GLUCOSE: 229 MG/DL
GLUCOSE: 231 MG/DL (ref 70–110)
HBA1C MFR BLD: 9.6 % (ref 4.4–6.4)
HDLC SERPL-MCNC: 29 MG/DL
LDL CHOLESTEROL DIRECT: 55 MG/DL
POTASSIUM SERPL-SCNC: 4.2 MEQ/L (ref 3.6–5)
SODIUM BLD-SCNC: 141 MEQ/L (ref 135–145)
TOTAL PROTEIN: 6.8 G/DL (ref 6.2–8)
TRIGL SERPL-MCNC: 266 MG/DL
VLDLC SERPL CALC-MCNC: 53 MG/DL

## 2019-07-16 ENCOUNTER — OFFICE VISIT (OUTPATIENT)
Dept: FAMILY MEDICINE CLINIC | Age: 59
End: 2019-07-16
Payer: COMMERCIAL

## 2019-07-16 VITALS
SYSTOLIC BLOOD PRESSURE: 138 MMHG | WEIGHT: 297.4 LBS | DIASTOLIC BLOOD PRESSURE: 7 MMHG | BODY MASS INDEX: 36.98 KG/M2 | HEIGHT: 75 IN | HEART RATE: 72 BPM | RESPIRATION RATE: 16 BRPM

## 2019-07-16 DIAGNOSIS — E11.9 TYPE 2 DIABETES MELLITUS WITHOUT COMPLICATION, UNSPECIFIED WHETHER LONG TERM INSULIN USE (HCC): Primary | Chronic | ICD-10-CM

## 2019-07-16 DIAGNOSIS — E78.5 HYPERLIPIDEMIA, UNSPECIFIED HYPERLIPIDEMIA TYPE: Chronic | ICD-10-CM

## 2019-07-16 DIAGNOSIS — I10 ESSENTIAL HYPERTENSION: ICD-10-CM

## 2019-07-16 PROCEDURE — 3017F COLORECTAL CA SCREEN DOC REV: CPT | Performed by: FAMILY MEDICINE

## 2019-07-16 PROCEDURE — 1036F TOBACCO NON-USER: CPT | Performed by: FAMILY MEDICINE

## 2019-07-16 PROCEDURE — 99214 OFFICE O/P EST MOD 30 MIN: CPT | Performed by: FAMILY MEDICINE

## 2019-07-16 PROCEDURE — G8427 DOCREV CUR MEDS BY ELIG CLIN: HCPCS | Performed by: FAMILY MEDICINE

## 2019-07-16 PROCEDURE — G8417 CALC BMI ABV UP PARAM F/U: HCPCS | Performed by: FAMILY MEDICINE

## 2019-07-16 PROCEDURE — 3046F HEMOGLOBIN A1C LEVEL >9.0%: CPT | Performed by: FAMILY MEDICINE

## 2019-07-16 PROCEDURE — 2022F DILAT RTA XM EVC RTNOPTHY: CPT | Performed by: FAMILY MEDICINE

## 2019-07-16 RX ORDER — GLIMEPIRIDE 4 MG/1
TABLET ORAL
Qty: 180 TABLET | Refills: 3 | Status: SHIPPED | OUTPATIENT
Start: 2019-07-16 | End: 2020-07-20 | Stop reason: SDUPTHER

## 2019-07-16 ASSESSMENT — ENCOUNTER SYMPTOMS
ABDOMINAL PAIN: 0
DIARRHEA: 0
SHORTNESS OF BREATH: 0
EYE PAIN: 0
CONSTIPATION: 0
ABDOMINAL DISTENTION: 0
SINUS PRESSURE: 0
SORE THROAT: 0
RHINORRHEA: 0
COUGH: 0
NAUSEA: 0

## 2019-07-16 NOTE — PROGRESS NOTES
 Calcium Carb-Cholecalciferol 600-1000 MG-UNIT CAPS Take 1 tablet by mouth daily.  therapeutic multivitamin-minerals (THERAGRAN-M) tablet Take 1 tablet by mouth daily. No current facility-administered medications for this visit. Allergies   Allergen Reactions    Zithromax [Azithromycin Dihydrate] Anaphylaxis     Throat swelling     Health Maintenance   Topic Date Due    Pneumococcal 0-64 years Vaccine (1 of 1 - PPSV23) 06/01/1967    HIV screen  06/01/1976    Hepatitis B Vaccine (1 of 3 - Risk 3-dose series) 06/01/1980    DTaP/Tdap/Td vaccine (1 - Tdap) 06/01/1980    Shingles Vaccine (1 of 2) 06/01/2011    Flu vaccine (1) 09/01/2019    A1C test (Diabetic or Prediabetic)  10/08/2019    Diabetic retinal exam  11/06/2019    Diabetic microalbuminuria test  11/19/2019    Diabetic foot exam  12/06/2019    Lipid screen  07/08/2020    Potassium monitoring  07/08/2020    Creatinine monitoring  07/08/2020    Colon cancer screen colonoscopy  12/11/2027    Hepatitis C screen  Completed         Objective:     Physical Exam   Constitutional: He is oriented to person, place, and time. He appears well-developed and well-nourished. No distress. HENT:   Head: Normocephalic and atraumatic. Right Ear: External ear normal.   Left Ear: External ear normal.   Eyes: Conjunctivae are normal.   Neck: No JVD present. Cardiovascular: Normal rate, regular rhythm and normal heart sounds. Pulmonary/Chest: Effort normal and breath sounds normal. He has no wheezes. He has no rales. Musculoskeletal: He exhibits no edema or tenderness. Neurological: He is alert and oriented to person, place, and time. Skin: Skin is warm and dry. He is not diaphoretic. No pallor. BP (!) 138/7 (Site: Left Upper Arm)   Pulse 72   Resp 16   Ht 6' 3\" (1.905 m)   Wt 297 lb 6.4 oz (134.9 kg)   BMI 37.17 kg/m²       Impression/Plan:  1.  Type 2 diabetes mellitus without complication, unspecified whether long term

## 2019-08-12 ENCOUNTER — TELEPHONE (OUTPATIENT)
Dept: FAMILY MEDICINE CLINIC | Age: 59
End: 2019-08-12

## 2019-08-12 DIAGNOSIS — E11.9 TYPE 2 DIABETES MELLITUS WITHOUT COMPLICATION, UNSPECIFIED WHETHER LONG TERM INSULIN USE (HCC): Primary | ICD-10-CM

## 2019-09-05 RX ORDER — LISINOPRIL 10 MG/1
TABLET ORAL
Qty: 90 TABLET | Refills: 3 | Status: SHIPPED | OUTPATIENT
Start: 2019-09-05 | End: 2020-07-20 | Stop reason: SDUPTHER

## 2019-09-05 RX ORDER — ATORVASTATIN CALCIUM 40 MG/1
TABLET, FILM COATED ORAL
Qty: 90 TABLET | Refills: 3 | Status: SHIPPED | OUTPATIENT
Start: 2019-09-05 | End: 2021-01-12

## 2019-09-05 RX ORDER — METFORMIN HYDROCHLORIDE 500 MG/1
TABLET, EXTENDED RELEASE ORAL
Qty: 180 TABLET | Refills: 3 | Status: SHIPPED | OUTPATIENT
Start: 2019-09-05 | End: 2020-07-20 | Stop reason: SDUPTHER

## 2019-09-16 ENCOUNTER — OFFICE VISIT (OUTPATIENT)
Dept: FAMILY MEDICINE CLINIC | Age: 59
End: 2019-09-16
Payer: COMMERCIAL

## 2019-09-16 VITALS
TEMPERATURE: 98 F | DIASTOLIC BLOOD PRESSURE: 80 MMHG | RESPIRATION RATE: 16 BRPM | SYSTOLIC BLOOD PRESSURE: 130 MMHG | WEIGHT: 291.2 LBS | HEART RATE: 57 BPM | BODY MASS INDEX: 36.4 KG/M2

## 2019-09-16 DIAGNOSIS — J01.90 ACUTE SINUSITIS, RECURRENCE NOT SPECIFIED, UNSPECIFIED LOCATION: Primary | ICD-10-CM

## 2019-09-16 PROCEDURE — G8427 DOCREV CUR MEDS BY ELIG CLIN: HCPCS | Performed by: FAMILY MEDICINE

## 2019-09-16 PROCEDURE — 1036F TOBACCO NON-USER: CPT | Performed by: FAMILY MEDICINE

## 2019-09-16 PROCEDURE — 3017F COLORECTAL CA SCREEN DOC REV: CPT | Performed by: FAMILY MEDICINE

## 2019-09-16 PROCEDURE — G8417 CALC BMI ABV UP PARAM F/U: HCPCS | Performed by: FAMILY MEDICINE

## 2019-09-16 PROCEDURE — 99213 OFFICE O/P EST LOW 20 MIN: CPT | Performed by: FAMILY MEDICINE

## 2019-09-16 RX ORDER — CEFUROXIME AXETIL 250 MG/1
250 TABLET ORAL 2 TIMES DAILY
Qty: 20 TABLET | Refills: 0 | Status: SHIPPED | OUTPATIENT
Start: 2019-09-16 | End: 2019-09-26

## 2019-09-16 ASSESSMENT — ENCOUNTER SYMPTOMS
ABDOMINAL DISTENTION: 0
SINUS PRESSURE: 1
NAUSEA: 0
SINUS PAIN: 1
SHORTNESS OF BREATH: 0
CONSTIPATION: 0
DIARRHEA: 0
COUGH: 0
SORE THROAT: 0
ABDOMINAL PAIN: 0
EYE PAIN: 0
RHINORRHEA: 1

## 2019-10-01 ENCOUNTER — OFFICE VISIT (OUTPATIENT)
Dept: INTERNAL MEDICINE CLINIC | Age: 59
End: 2019-10-01
Payer: COMMERCIAL

## 2019-10-01 VITALS
HEART RATE: 64 BPM | BODY MASS INDEX: 36.5 KG/M2 | DIASTOLIC BLOOD PRESSURE: 80 MMHG | SYSTOLIC BLOOD PRESSURE: 142 MMHG | WEIGHT: 292 LBS

## 2019-10-01 DIAGNOSIS — E11.9 TYPE 2 DIABETES MELLITUS WITHOUT COMPLICATION, UNSPECIFIED WHETHER LONG TERM INSULIN USE (HCC): Chronic | ICD-10-CM

## 2019-10-01 PROCEDURE — G0108 DIAB MANAGE TRN  PER INDIV: HCPCS | Performed by: INTERNAL MEDICINE

## 2019-10-16 ENCOUNTER — OFFICE VISIT (OUTPATIENT)
Dept: INTERNAL MEDICINE CLINIC | Age: 59
End: 2019-10-16
Payer: COMMERCIAL

## 2019-10-16 VITALS — HEIGHT: 75 IN | BODY MASS INDEX: 36.5 KG/M2

## 2019-10-16 DIAGNOSIS — E11.9 TYPE 2 DIABETES MELLITUS WITHOUT COMPLICATION, UNSPECIFIED WHETHER LONG TERM INSULIN USE (HCC): Chronic | ICD-10-CM

## 2019-10-16 PROCEDURE — 97802 MEDICAL NUTRITION INDIV IN: CPT | Performed by: DIETITIAN, REGISTERED

## 2019-11-05 RX ORDER — INSULIN DEGLUDEC INJECTION 100 U/ML
INJECTION, SOLUTION SUBCUTANEOUS
Qty: 9 PEN | Refills: 3 | Status: SHIPPED | OUTPATIENT
Start: 2019-11-05 | End: 2020-01-06

## 2019-12-30 DIAGNOSIS — E11.9 TYPE 2 DIABETES MELLITUS WITHOUT COMPLICATION, UNSPECIFIED WHETHER LONG TERM INSULIN USE (HCC): Primary | ICD-10-CM

## 2020-01-06 RX ORDER — INSULIN DEGLUDEC INJECTION 100 U/ML
INJECTION, SOLUTION SUBCUTANEOUS
Qty: 27 PEN | Refills: 2 | Status: SHIPPED | OUTPATIENT
Start: 2020-01-06 | End: 2020-01-08 | Stop reason: SDUPTHER

## 2020-01-08 ENCOUNTER — OFFICE VISIT (OUTPATIENT)
Dept: INTERNAL MEDICINE CLINIC | Age: 60
End: 2020-01-08
Payer: COMMERCIAL

## 2020-01-08 VITALS — BODY MASS INDEX: 37.67 KG/M2 | WEIGHT: 303 LBS | HEIGHT: 75 IN

## 2020-01-08 PROCEDURE — 97803 MED NUTRITION INDIV SUBSEQ: CPT | Performed by: DIETITIAN, REGISTERED

## 2020-01-08 NOTE — PATIENT INSTRUCTIONS
1.)  When eating a late supper, do not have carbs in your bedtime snack  - have non-starchy veggies and 1 protein - eg. 2 TB nuts or nut butter OR 1 oz cheese  - It may help you to have an afternoon snack so not too hungry for supper. 2.)  Good idea to have brown rice instead of white and \"Whole\" grain/wheat bread instead of white or refined wheat bread. 3.)  Drink water before during and after meals especially with your supper meal to help with hunger and to aid digestion.  -Total fluid intake:  ~ 80 ounces/day.    4.)  Cut back on added fats to help with weight loss efforts    5. ) Your meal plan is 60 gms carbs/meal  - add protein  - add non-starchy veggies anytime    Thanks for the food logging. Bring again along with your meter every time you come to the [facility].

## 2020-01-08 NOTE — PROGRESS NOTES
cake OR cheeseball and crackers. -Main Beverages: Water, diet caffeine free pop occasionally.    -Impression of Dietary Intake: on average, 3 meals per day, on average, 2 fast food meals per week, on average, 0-1 servings fruit per day, on average, 0-1 servings vegetables per day, high fat/ cholesterol, frequent sweets consumption d/t holiday time. Pt states he is also taking Beet supplement. Started 2 weeks ago. Current Outpatient Medications on File Prior to Visit   Medication Sig Dispense Refill    blood glucose test strips (ONE TOUCH ULTRA TEST) strip TEST TWO TIMES A DAY Dx: E11.9 200 each 3    lisinopril (PRINIVIL;ZESTRIL) 10 MG tablet TAKE 1 TABLET BY MOUTH EVERY DAY 90 tablet 3    atorvastatin (LIPITOR) 40 MG tablet TAKE 1 TABLET BY MOUTH EVERY DAY 90 tablet 3    metFORMIN (GLUCOPHAGE-XR) 500 MG extended release tablet TAKE 2 TABLETS BY MOUTH DAILY WITH BREAKFAST (Patient taking differently: Taking 1 tablet AM and 1 table evening) 180 tablet 3    glimepiride (AMARYL) 4 MG tablet TAKE 1 TABLET BY MOUTH TWICE A DAY WITH MEALS 180 tablet 3    Insulin Pen Needle (B-D UF III MINI PEN NEEDLES) 31G X 5 MM MISC USE 1 ONCE DAILY and as needed Dx: E11.9 200 each 3    SITagliptin (JANUVIA) 100 MG tablet Take 1 tablet by mouth daily 90 tablet 3    Insulin Degludec (TRESIBA FLEXTOUCH) 100 UNIT/ML SOPN Inject 40 Units into the skin daily (Patient taking differently: Inject 35 Units into the skin daily ) 6 pen 5    Liraglutide (VICTOZA) 18 MG/3ML SOPN SC injection Inject 1.8 mg into the skin daily 27 pen 3    nystatin-triamcinolone (MYCOLOG II) 432058-7.1 UNIT/GM-% cream Apply topically 2 times daily. 60 g 5    SOFT TOUCH LANCETS MISC 1 Cartridge by Does not apply route 2 times daily 100 each 3    aspirin EC 81 MG EC tablet Take 1 tablet by mouth daily. 30 tablet 3    Acai 25 MG CAPS Take  by mouth daily.  Krill Oil CAPS Take 1 tablet by mouth daily.         Calcium Carb-Cholecalciferol 600-1000 refined wheat bread. 3.)  Drink water before during and after meals especially with your supper meal to help with hunger and to aid digestion.  -Total fluid intake:  ~ 80 ounces/day.    4.)  Cut back on added fats to help with weight loss efforts    5. ) Your meal plan is 60 gms carbs/meal  - add protein  - add non-starchy veggies anytime    Thanks for the food logging. Bring again along with your meter every time you come to the [facility].    -Nutrition prescription: 2000 - 2200 calories/day, 225 - 250 g carbs/day. < 70 gms fat/day    Comprehension verified using teachback method. Monitoring/Evaluation:   -Followup visit: 3 weeks with Darleen Bansal CNP and 3 mo with dietitian. (pt does not want a lot of appointments to come to at the [facility])  -Receptiveness to education/goals: Agreeable.  -Evaluation of education: Indicates understanding.  -Readiness to change: contemplation - ambivalent about change - moderating his portions at supper. Action - eliminating carbs from his evening snack.  -Expected compliance: good. Thank you for your referral of this patient. Total time involved in direct patient education: 60 minutes for follow-up MNT visit.

## 2020-02-04 ENCOUNTER — OFFICE VISIT (OUTPATIENT)
Dept: INTERNAL MEDICINE CLINIC | Age: 60
End: 2020-02-04
Payer: COMMERCIAL

## 2020-02-04 VITALS
RESPIRATION RATE: 12 BRPM | HEART RATE: 62 BPM | WEIGHT: 307 LBS | OXYGEN SATURATION: 98 % | DIASTOLIC BLOOD PRESSURE: 80 MMHG | SYSTOLIC BLOOD PRESSURE: 150 MMHG | HEIGHT: 75 IN | BODY MASS INDEX: 38.17 KG/M2

## 2020-02-04 LAB — HBA1C MFR BLD: 9.1 % (ref 4.3–5.7)

## 2020-02-04 PROCEDURE — 1036F TOBACCO NON-USER: CPT | Performed by: NURSE PRACTITIONER

## 2020-02-04 PROCEDURE — 3017F COLORECTAL CA SCREEN DOC REV: CPT | Performed by: NURSE PRACTITIONER

## 2020-02-04 PROCEDURE — G8417 CALC BMI ABV UP PARAM F/U: HCPCS | Performed by: NURSE PRACTITIONER

## 2020-02-04 PROCEDURE — 99204 OFFICE O/P NEW MOD 45 MIN: CPT | Performed by: NURSE PRACTITIONER

## 2020-02-04 PROCEDURE — 83036 HEMOGLOBIN GLYCOSYLATED A1C: CPT | Performed by: NURSE PRACTITIONER

## 2020-02-04 PROCEDURE — G8484 FLU IMMUNIZE NO ADMIN: HCPCS | Performed by: NURSE PRACTITIONER

## 2020-02-04 PROCEDURE — G8427 DOCREV CUR MEDS BY ELIG CLIN: HCPCS | Performed by: NURSE PRACTITIONER

## 2020-02-04 PROCEDURE — 2022F DILAT RTA XM EVC RTNOPTHY: CPT | Performed by: NURSE PRACTITIONER

## 2020-02-04 PROCEDURE — 3046F HEMOGLOBIN A1C LEVEL >9.0%: CPT | Performed by: NURSE PRACTITIONER

## 2020-02-04 ASSESSMENT — ENCOUNTER SYMPTOMS
RHINORRHEA: 0
SHORTNESS OF BREATH: 0
SINUS PRESSURE: 0
DIARRHEA: 0
VOMITING: 0
SINUS PAIN: 0
CONSTIPATION: 0
BLOOD IN STOOL: 0
COUGH: 0
ABDOMINAL DISTENTION: 0
NAUSEA: 0
PHOTOPHOBIA: 0
WHEEZING: 0
TROUBLE SWALLOWING: 0
SORE THROAT: 0
ABDOMINAL PAIN: 0

## 2020-02-04 ASSESSMENT — PATIENT HEALTH QUESTIONNAIRE - PHQ9
2. FEELING DOWN, DEPRESSED OR HOPELESS: 0
SUM OF ALL RESPONSES TO PHQ QUESTIONS 1-9: 0
SUM OF ALL RESPONSES TO PHQ9 QUESTIONS 1 & 2: 0
1. LITTLE INTEREST OR PLEASURE IN DOING THINGS: 0
SUM OF ALL RESPONSES TO PHQ QUESTIONS 1-9: 0

## 2020-02-04 NOTE — PATIENT INSTRUCTIONS
Change Tresiba to 20 units twice daily    Stop Victoza     Call me in 2 weeks with blood sugar readings [phone]

## 2020-02-04 NOTE — PROGRESS NOTES
release tablet, TAKE 2 TABLETS BY MOUTH DAILY WITH BREAKFAST (Patient taking differently: Taking 1 tablet AM and 1 table evening), Disp: 180 tablet, Rfl: 3    glimepiride (AMARYL) 4 MG tablet, TAKE 1 TABLET BY MOUTH TWICE A DAY WITH MEALS, Disp: 180 tablet, Rfl: 3    Insulin Pen Needle (B-D UF III MINI PEN NEEDLES) 31G X 5 MM MISC, USE 1 ONCE DAILY and as needed Dx: E11.9, Disp: 200 each, Rfl: 3    SITagliptin (JANUVIA) 100 MG tablet, Take 1 tablet by mouth daily, Disp: 90 tablet, Rfl: 3    Insulin Degludec (TRESIBA FLEXTOUCH) 100 UNIT/ML SOPN, Inject 40 Units into the skin daily (Patient taking differently: Inject 35 Units into the skin daily ), Disp: 6 pen, Rfl: 5    Liraglutide (VICTOZA) 18 MG/3ML SOPN SC injection, Inject 1.8 mg into the skin daily, Disp: 27 pen, Rfl: 3    nystatin-triamcinolone (MYCOLOG II) 938918-1.1 UNIT/GM-% cream, Apply topically 2 times daily. , Disp: 60 g, Rfl: 5    SOFT TOUCH LANCETS MISC, 1 Cartridge by Does not apply route 2 times daily, Disp: 100 each, Rfl: 3    aspirin EC 81 MG EC tablet, Take 1 tablet by mouth daily. , Disp: 30 tablet, Rfl: 3    Acai 25 MG CAPS, Take  by mouth daily. , Disp: , Rfl:     Krill Oil CAPS, Take 1 tablet by mouth daily. , Disp: , Rfl:     Calcium Carb-Cholecalciferol 600-1000 MG-UNIT CAPS, Take 1 tablet by mouth daily. , Disp: , Rfl:     therapeutic multivitamin-minerals (THERAGRAN-M) tablet, Take 1 tablet by mouth daily. , Disp: , Rfl:     The patient is allergic to zithromax [azithromycin dihydrate]. Past Medical History  Mari Johnson  has a past medical history of Diabetes mellitus ([address] Diabetes mellitus out of control ([address] Glucose intolerance (impaired glucose tolerance), HTN (hypertension), and Hyperlipidemia. Past Surgical History  The patient  has a past surgical history that includes lipoma resection (2007); Appendectomy (2006); Knee arthroscopy (12/7/11);  Tonsillectomy; Colonoscopy (04/28/2017); colectomy (05/23/2017); and Colonoscopy (11/2017). Family History  This patient's family history includes Cancer in his mother; Diabetes in his mother; Heart Disease in his father; High Blood Pressure in his mother; Stroke in his father. Social History  Zafar Rodriguez  reports that he quit smoking about 15 years ago. His smoking use included cigarettes. He started smoking about 38 years ago. He has a 6.00 pack-year smoking history. He has never used smokeless tobacco. He reports current alcohol use. He reports that he does not use drugs. Health Maintenance:    Health Maintenance   Topic Date Due    Pneumococcal 0-64 years Vaccine (1 of 1 - PPSV23) 06/01/1967    DTaP/Tdap/Td vaccine (1 - Tdap) 06/01/1972    HIV screen  06/01/1976    Hepatitis B vaccine (1 of 3 - Risk 3-dose series) 06/01/1980    Shingles Vaccine (1 of 2) 06/01/2011    Flu vaccine (1) 09/01/2019    Diabetic microalbuminuria test  11/19/2019    Diabetic foot exam  12/06/2019    A1C test (Diabetic or Prediabetic)  05/04/2020    Lipid screen  07/08/2020    Potassium monitoring  07/08/2020    Creatinine monitoring  07/08/2020    Diabetic retinal exam  12/06/2020    Colon cancer screen colonoscopy  01/06/2030    Hepatitis C screen  Completed    Hepatitis A vaccine  Aged Out    Hib vaccine  Aged Out    Meningococcal (ACWY) vaccine  Aged Out       Subjective:      Review of Systems   Constitutional: Negative for chills, fatigue and fever. HENT: Negative for congestion, rhinorrhea, sinus pressure, sinus pain, sore throat, tinnitus and trouble swallowing. Eyes: Negative for photophobia and visual disturbance. Respiratory: Negative for cough, shortness of breath and wheezing. Cardiovascular: Negative for chest pain, palpitations and leg swelling. Gastrointestinal: Negative for abdominal distention, abdominal pain, blood in stool, constipation, diarrhea, nausea and vomiting. Endocrine: Negative for polydipsia, polyphagia and polyuria.    Genitourinary: Negative for difficulty urinating, dysuria, frequency, hematuria and urgency. Musculoskeletal: Negative for arthralgias and myalgias. Skin: Negative for rash and wound. Neurological: Negative for dizziness, light-headedness and headaches. Psychiatric/Behavioral: Negative for dysphoric mood and sleep disturbance. The patient is not nervous/anxious. Objective:     BP (!) 150/80 (Site: Right Upper Arm, Position: Sitting, Cuff Size: Large Adult)   Pulse 62   Resp 12   Ht 6' 3\" (1.905 m)   Wt (!) 307 lb (139.3 kg)   SpO2 98%   BMI 38.37 kg/m²     Physical Exam  Constitutional:       General: He is not in acute distress. Appearance: Normal appearance. He is normal weight. He is not ill-appearing. HENT:      Head: Normocephalic and atraumatic. Right Ear: Tympanic membrane, ear canal and external ear normal.      Left Ear: Tympanic membrane, ear canal and external ear normal.      Nose: Nose normal. No congestion or rhinorrhea. Mouth/Throat:      Mouth: Mucous membranes are moist.      Pharynx: Oropharynx is clear. No oropharyngeal exudate or posterior oropharyngeal erythema. Eyes:      Extraocular Movements: Extraocular movements intact. Conjunctiva/sclera: Conjunctivae normal.      Pupils: Pupils are equal, round, and reactive to light. Neck:      Musculoskeletal: Normal range of motion and neck supple. No muscular tenderness. Vascular: No carotid bruit. Cardiovascular:      Rate and Rhythm: Normal rate and regular rhythm. Pulses: Normal pulses. Heart sounds: No murmur. No friction rub. No gallop. Pulmonary:      Effort: Pulmonary effort is normal. No respiratory distress. Breath sounds: Normal breath sounds. No wheezing, rhonchi or rales. Abdominal:      General: Abdomen is flat. Bowel sounds are normal. There is no distension. Palpations: Abdomen is soft. Tenderness: There is no abdominal tenderness.    Musculoskeletal: Normal range of motion. General: No swelling or tenderness. Lymphadenopathy:      Cervical: No cervical adenopathy. Skin:     General: Skin is warm and dry. Capillary Refill: Capillary refill takes less than 2 seconds. Findings: No erythema or rash. Neurological:      General: No focal deficit present. Mental Status: He is alert and oriented to person, place, and time. Motor: No weakness. Coordination: Coordination normal.      Deep Tendon Reflexes: Reflexes normal.   Psychiatric:         Mood and Affect: Mood normal.         Behavior: Behavior normal.         Thought Content: Thought content normal.         Judgment: Judgment normal.         Labs Reviewed 2/4/2020:    Lab Results   Component Value Date    WBC 7.1 05/30/2019    HGB 15.2 05/30/2019    HCT 45.4 05/30/2019     05/30/2019    CHOL 130 07/08/2019    TRIG 266 (H) 07/08/2019    HDL 29 (L) 07/08/2019    LDLDIRECT 55 07/08/2019    ALT 34 07/08/2019    AST 33 07/08/2019     07/08/2019    K 4.2 07/08/2019     07/08/2019    CREATININE 1.10 07/08/2019    BUN 19 07/08/2019    CO2 27 07/08/2019    PSA 0.35 11/19/2018    LABA1C 9.1 (H) 02/04/2020    LABMICR 8.8 11/19/2018       Assessment/Plan      1. Type 2 diabetes mellitus without complication, unspecified whether long term insulin use (HCC)    - POCT glycosylated hemoglobin (Hb A1C)  - Increase Tresiba to 20 units BID  - Stop Victoza- due to expense   - Call in 2 weeks with blood sugar readings  - Be sure to bring meter to visits for download  - Encouraged dietary modifications and exercise regimen to promote optimal glycemic control  - Follow with diabetes clinic as previously scheduled    Return in about 3 months (around 5/4/2020). Patient given educational materials - see patient instructions. Discussed use, benefit, and side effects of prescribed medications. All patient questions answered. Pt voiced understanding. Reviewed health maintenance.

## 2020-02-24 NOTE — TELEPHONE ENCOUNTER
Last visit- 2/4/2020  Next visit- 4/7/2020    Requested Prescriptions     Pending Prescriptions Disp Refills    Insulin Degludec (TRESIBA FLEXTOUCH) 100 UNIT/ML SOPN 6 pen      Sig: Inject 20 Units into the skin 2 times daily

## 2020-03-04 RX ORDER — SITAGLIPTIN 100 MG/1
TABLET, FILM COATED ORAL
Qty: 90 TABLET | Refills: 1 | Status: SHIPPED | OUTPATIENT
Start: 2020-03-04 | End: 2020-08-19

## 2020-06-23 ENCOUNTER — OFFICE VISIT (OUTPATIENT)
Dept: INTERNAL MEDICINE CLINIC | Age: 60
End: 2020-06-23
Payer: COMMERCIAL

## 2020-06-23 VITALS — BODY MASS INDEX: 37.55 KG/M2 | WEIGHT: 302 LBS | TEMPERATURE: 97.2 F | HEIGHT: 75 IN

## 2020-06-23 PROCEDURE — 97803 MED NUTRITION INDIV SUBSEQ: CPT | Performed by: DIETITIAN, REGISTERED

## 2020-06-23 NOTE — PROGRESS NOTES
[address] [place] [address]. [address] [place] [address] [address] [facility]  [phone] (phone)  [phone] (fax)    Patient Name: Pauline Tinajero. Date of Birth: [id number]. MRN: [id number]      Assessment: Patient is a 62 y.o. male seen for follow-up MNT visit for Type 2 DB.     -Nutritionally relevant labs:   Lab Results   Component Value Date/Time    LABA1C 9.1 (H) 02/04/2020 03:26 PM    LABA1C 9.6 (H) 07/08/2019 06:37 AM    LABA1C 9.2 (H) 03/04/2019 06:33 AM    LABA1C 9.9 (H) 12/03/2018 06:41 AM    GLUCOSE 231 (H) 07/08/2019 06:37 AM    GLUCOSE 235 (H) 05/30/2019 06:40 AM    CHOL 130 07/08/2019 06:37 AM    CHOL 136 11/17/2016    HDL 29 (L) 07/08/2019 06:37 AM    HDL 40 03/18/2009    LDLCALC 60 11/17/2016    TRIG 266 (H) 07/08/2019 06:37 AM     -Blood sugar trends: Downloaded meter, reviewed and scanned. FBS's:  189 - 297, highest 304, 320.    -Food recall: forgot food logs. Breakfast: 5-530 am; grapenuts or oatmeal.    1st break:  Hard boiled eggs OR apple OR granola bar with nuts and berries. Lunch: 11 - 1130 - Today - sandwich - rotissirie chicken wheat bread and to and bag of mixed fruit, crystal lite   occ corn chips or peanuts or celery and carrots  Dinner: Last night - 2 coney dogs, buns, onion, broccoli. Snacks: bedtime snack - potato chips. -Main Beverages: Crystal light, diet pop. Denies drinking regularly sweetened drinks    -Impression of Dietary Intake: on average, 3 meals per day, on average, 1 servings fruit per day, on average, 0-1 servings vegetables per day, high fat/ cholesterol, high salt, 2-3 snacks/day. .    Current Outpatient Medications on File Prior to Visit   Medication Sig Dispense Refill    JANUVIA 100 MG tablet TAKE 1 TABLET BY MOUTH EVERY DAY 90 tablet 1    Insulin Degludec (TRESIBA FLEXTOUCH) 100 UNIT/ML SOPN Inject 20 Units into the skin 2 times daily 5 pen 3    blood glucose test strips (ONE TOUCH ULTRA TEST) strip TEST TWO TIMES A DAY Dx: E11.9 200 each 3    lisinopril (PRINIVIL;ZESTRIL) 10 MG tablet TAKE 1 TABLET BY MOUTH EVERY DAY 90 tablet 3    atorvastatin (LIPITOR) 40 MG tablet TAKE 1 TABLET BY MOUTH EVERY DAY 90 tablet 3    metFORMIN (GLUCOPHAGE-XR) 500 MG extended release tablet TAKE 2 TABLETS BY MOUTH DAILY WITH BREAKFAST (Patient taking differently: Taking 1 tablet AM and 1 table evening) 180 tablet 3    glimepiride (AMARYL) 4 MG tablet TAKE 1 TABLET BY MOUTH TWICE A DAY WITH MEALS 180 tablet 3    Insulin Pen Needle (B-D UF III MINI PEN NEEDLES) 31G X 5 MM MISC USE 1 ONCE DAILY and as needed Dx: E11.9 200 each 3    nystatin-triamcinolone (MYCOLOG II) 636921-3.1 UNIT/GM-% cream Apply topically 2 times daily. 60 g 5    SOFT TOUCH LANCETS MISC 1 Cartridge by Does not apply route 2 times daily 100 each 3    aspirin EC 81 MG EC tablet Take 1 tablet by mouth daily. 30 tablet 3    Acai 25 MG CAPS Take  by mouth daily.  Krill Oil CAPS Take 1 tablet by mouth daily.  Calcium Carb-Cholecalciferol 600-1000 MG-UNIT CAPS Take 1 tablet by mouth daily.  therapeutic multivitamin-minerals (THERAGRAN-M) tablet Take 1 tablet by mouth daily.  Liraglutide (VICTOZA) 18 MG/3ML SOPN SC injection Inject 1.8 mg into the skin daily (Patient not taking: Reported on 6/23/2020) 27 pen 3     No current facility-administered medications on file prior to visit. Vitals from current and previous visits:  Temp 97.2 °F (36.2 °C)   Ht 6' 3\" (1.905 m)   Wt (!) 302 lb (137 kg)   BMI 37.75 kg/m²     -Body mass index is 37.75 kg/m². 35-39.9 - Obesity Grade II.   - Patient maintained.  -Weight goal: lose weight. Nutrition Diagnosis:   Overweight/Obesity related to Lack of previous MNT/currently undergoing MNT as evidenced by Patient report of over portioning supper meal and eating evening snacks. Intervention:  -Impression: Did not want to see RN-CDE d/t too many appointments.   Pt works at [employer]  and has

## 2020-06-26 ENCOUNTER — TELEPHONE (OUTPATIENT)
Dept: INTERNAL MEDICINE CLINIC | Age: 60
End: 2020-06-26

## 2020-07-20 RX ORDER — PEN NEEDLE, DIABETIC 31 GX5/16"
NEEDLE, DISPOSABLE MISCELLANEOUS
Qty: 200 EACH | Refills: 3 | Status: SHIPPED | OUTPATIENT
Start: 2020-07-20 | End: 2021-06-29 | Stop reason: SDUPTHER

## 2020-07-20 RX ORDER — LISINOPRIL 10 MG/1
TABLET ORAL
Qty: 90 TABLET | Refills: 1 | Status: SHIPPED | OUTPATIENT
Start: 2020-07-20 | End: 2021-03-15

## 2020-07-20 RX ORDER — GLIMEPIRIDE 4 MG/1
TABLET ORAL
Qty: 180 TABLET | Refills: 1 | Status: SHIPPED | OUTPATIENT
Start: 2020-07-20 | End: 2020-12-28 | Stop reason: SDUPTHER

## 2020-07-20 RX ORDER — METFORMIN HYDROCHLORIDE 500 MG/1
TABLET, EXTENDED RELEASE ORAL
Qty: 180 TABLET | Refills: 1 | Status: SHIPPED | OUTPATIENT
Start: 2020-07-20 | End: 2020-11-02 | Stop reason: SDUPTHER

## 2020-07-20 NOTE — TELEPHONE ENCOUNTER
Last visit- 6/23/2020  Next visit- 7/30/2020    Requested Prescriptions     Pending Prescriptions Disp Refills    lisinopril (PRINIVIL;ZESTRIL) 10 MG tablet 90 tablet 1     Sig: TAKE 1 TABLET BY MOUTH EVERY DAY    glimepiride (AMARYL) 4 MG tablet 180 tablet 1     Sig: TAKE 1 TABLET BY MOUTH TWICE A DAY WITH MEALS    metFORMIN (GLUCOPHAGE-XR) 500 MG extended release tablet 180 tablet 1     Sig: Taking 1 tablet AM and 1 tablet evening    Insulin Pen Needle (B-D UF III MINI PEN NEEDLES) 31G X 5 MM MISC 200 each 3     Sig: USE 1 ONCE DAILY and as needed Dx: E11.9       [facility] [address]

## 2020-07-30 ENCOUNTER — OFFICE VISIT (OUTPATIENT)
Dept: INTERNAL MEDICINE CLINIC | Age: 60
End: 2020-07-30
Payer: COMMERCIAL

## 2020-07-30 VITALS
BODY MASS INDEX: 37.63 KG/M2 | TEMPERATURE: 97 F | WEIGHT: 302.6 LBS | DIASTOLIC BLOOD PRESSURE: 80 MMHG | SYSTOLIC BLOOD PRESSURE: 146 MMHG | HEIGHT: 75 IN | HEART RATE: 53 BPM

## 2020-07-30 LAB — HBA1C MFR BLD: 10.5 % (ref 4.3–5.7)

## 2020-07-30 PROCEDURE — G8427 DOCREV CUR MEDS BY ELIG CLIN: HCPCS | Performed by: NURSE PRACTITIONER

## 2020-07-30 PROCEDURE — 2022F DILAT RTA XM EVC RTNOPTHY: CPT | Performed by: NURSE PRACTITIONER

## 2020-07-30 PROCEDURE — 83036 HEMOGLOBIN GLYCOSYLATED A1C: CPT | Performed by: NURSE PRACTITIONER

## 2020-07-30 PROCEDURE — 3017F COLORECTAL CA SCREEN DOC REV: CPT | Performed by: NURSE PRACTITIONER

## 2020-07-30 PROCEDURE — G8417 CALC BMI ABV UP PARAM F/U: HCPCS | Performed by: NURSE PRACTITIONER

## 2020-07-30 PROCEDURE — 1036F TOBACCO NON-USER: CPT | Performed by: NURSE PRACTITIONER

## 2020-07-30 PROCEDURE — 3046F HEMOGLOBIN A1C LEVEL >9.0%: CPT | Performed by: NURSE PRACTITIONER

## 2020-07-30 PROCEDURE — 99214 OFFICE O/P EST MOD 30 MIN: CPT | Performed by: NURSE PRACTITIONER

## 2020-07-30 ASSESSMENT — ENCOUNTER SYMPTOMS
PHOTOPHOBIA: 0
BLOOD IN STOOL: 0
DIARRHEA: 0
VOMITING: 0
NAUSEA: 0
SINUS PRESSURE: 0
COUGH: 0
SHORTNESS OF BREATH: 0
ABDOMINAL PAIN: 0
SINUS PAIN: 0
SORE THROAT: 0
ABDOMINAL DISTENTION: 0
TROUBLE SWALLOWING: 0
CONSTIPATION: 0
RHINORRHEA: 0
WHEEZING: 0

## 2020-07-30 NOTE — PROGRESS NOTES
2020     Jerry Felix (:  1960) is a 62 y.o. male, here for evaluation of the following medical concerns: diabetes    I last seen [facility] 6 months ago. He follows routinely with Dr. Cristy Arredondo. No ER visits or hospitalizations since last visit.      Diabetes-  Diagnosed 7-8 years ago. He states his diabetes is not well controlled. A1C 10.5% today in office, was 9.1% previously. Is on metformin 500 mg BID, glimepiride 4 mg BID, sitagliptin 100 mg daily, and Tresiba 30 in the am and 20 in the evening. He did not tolerate Invokana. Is on lisinopril 10 mg daily, with last urinary microalbumin/crt ratio 4.4 in 2018. States hypoglycemic events not present. Is able to voice signs/symptoms of hypoglycemia as dizziness and diaphoresis. Reports checking glucose 2 times per day, with range of glucose readings 239-363 per meter download. Attempting dietary modification for diabetes management and/or weight loss. Currently follows with diabetes clinic, dietician, CDE. Does report difficulty with obtaining medications. Last eye exam 2019. BP reccs <140/90 (<130/80 in CVD risk). On aspirin 81 mg daily and atorvastatin 40 mg daily.      Denies polyuria, polydipsia, polyphagia. Reports occasionally neuropathic symptoms including numbness, tingling. Does not have any wounds to feet. Review of Systems   Constitutional: Negative for chills, fatigue and fever. HENT: Negative for congestion, rhinorrhea, sinus pressure, sinus pain, sore throat, tinnitus and trouble swallowing. Eyes: Negative for photophobia and visual disturbance. Respiratory: Negative for cough, shortness of breath and wheezing. Cardiovascular: Negative for chest pain, palpitations and leg swelling. Gastrointestinal: Negative for abdominal distention, abdominal pain, blood in stool, constipation, diarrhea, nausea and vomiting. Endocrine: Negative for polydipsia, polyphagia and polyuria.    Genitourinary: Negative for difficulty urinating, dysuria, frequency, hematuria and urgency. Musculoskeletal: Negative for arthralgias and myalgias. Skin: Negative for rash and wound. Neurological: Negative for dizziness, light-headedness and headaches. Psychiatric/Behavioral: Negative for dysphoric mood and sleep disturbance. The patient is not nervous/anxious. Prior to Visit Medications    Medication Sig Taking? Authorizing Provider   Insulin Degludec (TRESIBA FLEXTOUCH) 100 UNIT/ML SOPN Inject 30 Units into the skin 2 times daily Yes LIZZ Miller CNP   lisinopril (PRINIVIL;ZESTRIL) 10 MG tablet TAKE 1 TABLET BY MOUTH EVERY DAY Yes LIZZ Bauman CNP   glimepiride (AMARYL) 4 MG tablet TAKE 1 TABLET BY MOUTH TWICE A DAY WITH MEALS Yes LIZZ High CNP   metFORMIN (GLUCOPHAGE-XR) 500 MG extended release tablet Taking 1 tablet AM and 1 tablet evening Yes LIZZ High CNP   Insulin Pen Needle (B-D UF III MINI PEN NEEDLES) 31G X 5 MM MISC USE 1 ONCE DAILY and as needed Dx: E11.9 Yes LIZZ Izquierdo CNP   JANUVIA 100 MG tablet TAKE 1 TABLET BY MOUTH EVERY DAY Yes Marium Kaplan MD   blood glucose test strips (ONE TOUCH ULTRA TEST) strip TEST TWO TIMES A DAY Dx: E11.9 Yes LIZZ Peterson CNP   atorvastatin (LIPITOR) 40 MG tablet TAKE 1 TABLET BY MOUTH EVERY DAY Yes Marium Kaplan MD   nystatin-triamcinolone (MYCOLOG II) 693574-6.0 UNIT/GM-% cream Apply topically 2 times daily. Yes Marium Kaplan MD   SOFT TOUCH LANCETS MISC 1 Cartridge by Does not apply route 2 times daily Yes Marium Kaplan MD   aspirin EC 81 MG EC tablet Take 1 tablet by mouth daily. Yes Marium Kaplan MD   Acai 25 MG CAPS Take  by mouth daily. Yes Historical Provider, MD Andrade Sickle CAPS Take 1 tablet by mouth daily. Yes Historical Provider, MD   Calcium Carb-Cholecalciferol 600-1000 MG-UNIT CAPS Take 1 tablet by mouth daily.    Yes Historical Provider, MD   therapeutic multivitamin-minerals [facility]) tablet Take 1 tablet by mouth daily. Yes Historical Provider, MD        Social History     Tobacco Use    Smoking status: Former Smoker     Packs/day: 0.50     Years: 12.00     Pack years: 6.00     Types: Cigarettes     Start date: 1/1/1982     Last attempt to quit: 1/1/2005     Years since quitting: 15.6    Smokeless tobacco: Never Used   Substance Use Topics    Alcohol use: Yes     Comment: RARELY        Vitals:    07/30/20 1546 07/30/20 1549   BP: (!) 168/86 (!) 146/80   Site: Right Upper Arm Left Upper Arm   Cuff Size: Large Adult    Pulse: 53    Temp: 97 °F (36.1 °C)    TempSrc: Temporal    Weight: (!) 302 lb 9.6 oz (137.3 kg)    Height: 6' 3\" (1.905 m)      Estimated body mass index is 37.82 kg/m² as calculated from the following:    Height as of this encounter: 6' 3\" (1.905 m). Weight as of this encounter: 302 lb 9.6 oz (137.3 kg). Physical Exam  Constitutional:       General: He is not in acute distress. Appearance: Normal appearance. He is normal weight. He is not ill-appearing. HENT:      Head: Normocephalic and atraumatic. Right Ear: Tympanic membrane, ear canal and external ear normal.      Left Ear: Tympanic membrane, ear canal and external ear normal.      Nose: Nose normal. No congestion or rhinorrhea. Mouth/Throat:      Mouth: Mucous membranes are moist.      Pharynx: Oropharynx is clear. No oropharyngeal exudate or posterior oropharyngeal erythema. Eyes:      Extraocular Movements: Extraocular movements intact. Conjunctiva/sclera: Conjunctivae normal.      Pupils: Pupils are equal, round, and reactive to light. Neck:      Musculoskeletal: Normal range of motion and neck supple. No muscular tenderness. Vascular: No carotid bruit. Cardiovascular:      Rate and Rhythm: Normal rate and regular rhythm. Pulses: Normal pulses. Heart sounds: No murmur. No friction rub. No gallop.     Pulmonary:      Effort: Pulmonary effort is normal. No respiratory distress. Breath sounds: Normal breath sounds. No wheezing, rhonchi or rales. Abdominal:      General: Abdomen is flat. Bowel sounds are normal. There is no distension. Palpations: Abdomen is soft. Tenderness: There is no abdominal tenderness. Musculoskeletal: Normal range of motion. General: No swelling or tenderness. Right lower leg: Edema (1+ pitting) present. Left lower leg: Edema (1+ pitting) present. Lymphadenopathy:      Cervical: No cervical adenopathy. Skin:     General: Skin is warm and dry. Capillary Refill: Capillary refill takes less than 2 seconds. Findings: No erythema or rash. Neurological:      General: No focal deficit present. Mental Status: He is alert and oriented to person, place, and time. Motor: No weakness. Coordination: Coordination normal.      Deep Tendon Reflexes: Reflexes normal.   Psychiatric:         Mood and Affect: Mood normal.         Behavior: Behavior normal.         Thought Content: Thought content normal.         Judgment: Judgment normal.         ASSESSMENT/PLAN:    1. Type 2 diabetes mellitus without complication, unspecified whether long term insulin use (HCC)    - POCT glycosylated hemoglobin (Hb A1C)  - Microalbumin / Creatinine Urine Ratio; Future  - Insulin Degludec (TRESIBA FLEXTOUCH) 100 UNIT/ML SOPN; Inject 30 Units into the skin 2 times daily  Dispense: 5 pen; Refill: 3  - Increase Tresiba to 30 units BID  - Call if any low blood sugars or if readings consistently > 180  - Encouraged dietary modifications and exercise regimen to promote optimal glycemic control  - Diabetic foot exam at next visit  - Follow with diabetes clinic as previously scheduled    Return in about 4 weeks (around 8/27/2020). An electronic signature was used to authenticate this note.     --LIZZ Byrnes - CNP on 8/10/2020 at 8:36 PM

## 2020-07-31 RX ORDER — INSULIN DEGLUDEC INJECTION 100 U/ML
30 INJECTION, SOLUTION SUBCUTANEOUS 2 TIMES DAILY
Qty: 5 PEN | Refills: 3 | Status: SHIPPED | OUTPATIENT
Start: 2020-07-31 | End: 2020-08-20

## 2020-08-19 RX ORDER — SITAGLIPTIN 100 MG/1
TABLET, FILM COATED ORAL
Qty: 90 TABLET | Refills: 0 | Status: SHIPPED | OUTPATIENT
Start: 2020-08-19 | End: 2020-08-20 | Stop reason: SDUPTHER

## 2020-08-20 RX ORDER — INSULIN DEGLUDEC INJECTION 100 U/ML
INJECTION, SOLUTION SUBCUTANEOUS
Qty: 5 PEN | Refills: 3 | Status: SHIPPED | OUTPATIENT
Start: 2020-08-20 | End: 2020-11-17

## 2020-08-20 NOTE — TELEPHONE ENCOUNTER
Patient requesting refill on [place]. Informed him Dr. Stephen Concepcion sent a script for Warden Bowles yesterday. Patient states he does not wish to see Dr. Stephen Concepcion anymore. Are you okay with filling his med? Patient requested rx for Warden Bowles in your name and I call pharmacy and cancel out rx that Dr. Stephen Concepcion sent. If you are okay with this, please sign script and send back to me so I can call pharmacy. Thanks.

## 2020-09-28 ENCOUNTER — OFFICE VISIT (OUTPATIENT)
Dept: INTERNAL MEDICINE CLINIC | Age: 60
End: 2020-09-28
Payer: COMMERCIAL

## 2020-09-28 VITALS
HEART RATE: 56 BPM | WEIGHT: 303.4 LBS | DIASTOLIC BLOOD PRESSURE: 80 MMHG | HEIGHT: 75 IN | BODY MASS INDEX: 37.72 KG/M2 | SYSTOLIC BLOOD PRESSURE: 134 MMHG

## 2020-09-28 PROCEDURE — 1036F TOBACCO NON-USER: CPT | Performed by: NURSE PRACTITIONER

## 2020-09-28 PROCEDURE — 3017F COLORECTAL CA SCREEN DOC REV: CPT | Performed by: NURSE PRACTITIONER

## 2020-09-28 PROCEDURE — G8417 CALC BMI ABV UP PARAM F/U: HCPCS | Performed by: NURSE PRACTITIONER

## 2020-09-28 PROCEDURE — 2022F DILAT RTA XM EVC RTNOPTHY: CPT | Performed by: NURSE PRACTITIONER

## 2020-09-28 PROCEDURE — 99213 OFFICE O/P EST LOW 20 MIN: CPT | Performed by: NURSE PRACTITIONER

## 2020-09-28 PROCEDURE — G8427 DOCREV CUR MEDS BY ELIG CLIN: HCPCS | Performed by: NURSE PRACTITIONER

## 2020-09-28 PROCEDURE — 3046F HEMOGLOBIN A1C LEVEL >9.0%: CPT | Performed by: NURSE PRACTITIONER

## 2020-09-28 RX ORDER — SEMAGLUTIDE 1.34 MG/ML
0.5 INJECTION, SOLUTION SUBCUTANEOUS WEEKLY
Qty: 12 PEN | Refills: 2 | Status: SHIPPED | OUTPATIENT
Start: 2020-09-28 | End: 2021-09-29 | Stop reason: SDUPTHER

## 2020-09-28 NOTE — PROGRESS NOTES
2020     Fatuma Núñez (:  1960) is a 62 y.o. male, here for evaluation of the following medical concerns:  diabetes     I last seen Gilford Starring 2 months ago. He follows routinely with Dr. Rob Sweeney ER visits or hospitalizations since last visit.      Diabetes-  Diagnosed 7-8 years ago. He states his diabetes is not well controlled.  A1C 10.5% today in office, was 9.1% previously. Is on metformin 500 mg BID, glimepiride 4 mg BID, sitagliptin 100 mg daily, and Tresiba 30 units BID. He did not tolerate Invokana. Is on lisinopril 10 mg daily, with last urinary microalbumin/crt ratio 4.4 in 2018. States hypoglycemic events not present. Is able to voice signs/symptoms of hypoglycemia as dizziness and diaphoresis.  Reports checking glucose 2 times per day, with range of glucose readings 127-357 per meter download. Attempting dietary modification for diabetes management and/or weight loss. Currently follows with diabetes clinic, dietician, CDE. Does report difficulty with obtaining medications. Last eye exam 2019. BP reccs <140/90 (<130/80 in CVD risk). On aspirin 81 mg daily and atorvastatin 40 mg daily.      Denies polyuria, polydipsia, polyphagia. Reports occasionally neuropathic symptoms including numbness, tingling. Does not have any wounds to feet    Trialed ozempic, he noticed improvement in his blood sugars. No side effects. Review of Systems   Constitutional: Negative for chills, fatigue and fever. HENT: Negative for congestion, rhinorrhea, sinus pressure, sinus pain, sore throat, tinnitus and trouble swallowing. Eyes: Negative for photophobia and visual disturbance. Respiratory: Negative for cough, shortness of breath and wheezing. Cardiovascular: Negative for chest pain, palpitations and leg swelling. Gastrointestinal: Negative for abdominal distention, abdominal pain, blood in stool, constipation, diarrhea, nausea and vomiting.    Endocrine: Negative for polydipsia, polyphagia and polyuria. Genitourinary: Negative for difficulty urinating, dysuria, frequency, hematuria and urgency. Musculoskeletal: Negative for arthralgias and myalgias. Skin: Negative for rash and wound. Neurological: Negative for dizziness, light-headedness and headaches. Psychiatric/Behavioral: Negative for dysphoric mood and sleep disturbance. The patient is not nervous/anxious. Prior to Visit Medications    Medication Sig Taking? Authorizing Provider   Semaglutide (OZEMPIC, 0.25 OR 0.5 MG/DOSE, SC) Inject into the skin once a week Yes Historical Provider, MD   Semaglutide,0.25 or 0.5MG/DOS, (OZEMPIC, 0.25 OR 0.5 MG/DOSE,) 2 MG/1.5ML SOPN Inject 0.5 mg into the skin once a week Yes LIZZ Jack CNP   TRESIBA FLEXTOUCH 100 UNIT/ML SOPN INJECT 20 UNITS INTO THE SKIN 2 TIMES DAILY  Patient taking differently: 30 Units  Yes LIZZ Jack CNP   SITagliptin (JANUVIA) 100 MG tablet TAKE 1 TABLET BY MOUTH EVERY DAY Yes LIZZ Jack CNP   lisinopril (PRINIVIL;ZESTRIL) 10 MG tablet TAKE 1 TABLET BY MOUTH EVERY DAY Yes LIZZ Bauman CNP   glimepiride (AMARYL) 4 MG tablet TAKE 1 TABLET BY MOUTH TWICE A DAY WITH MEALS Yes LIZZ Ordonez CNP   metFORMIN (GLUCOPHAGE-XR) 500 MG extended release tablet Taking 1 tablet AM and 1 tablet evening Yes LIZZ Ordonez CNP   Insulin Pen Needle (B-D UF III MINI PEN NEEDLES) 31G X 5 MM MISC USE 1 ONCE DAILY and as needed Dx: E11.9 Yes LIZZ Burgos CNP   blood glucose test strips (ONE TOUCH ULTRA TEST) strip TEST TWO TIMES A DAY Dx: E11.9 Yes LIZZ Blackmon CNP   atorvastatin (LIPITOR) 40 MG tablet TAKE 1 TABLET BY MOUTH EVERY DAY Yes Wai Mccurdy MD   nystatin-triamcinolone (MYCOLOG II) 617528-3.2 UNIT/GM-% cream Apply topically 2 times daily.  Yes Wai Mccurdy MD   SOFT TOUCH LANCETS MISC 1 Cartridge by Does not apply route 2 times daily Yes Wai Mccurdy MD   aspirin EC 81 MG EC tablet Take 1 tablet by mouth daily. Yes Jeromy Zuniag MD   Acai 25 MG CAPS Take  by mouth daily. Yes Historical Provider, MD   Candkristine Linea CAPS Take 1 tablet by mouth daily. Yes Historical Provider, MD   Calcium Carb-Cholecalciferol 600-1000 MG-UNIT CAPS Take 1 tablet by mouth daily. Yes Historical Provider, MD   therapeutic multivitamin-minerals (THERAGRAN-M) tablet Take 1 tablet by mouth daily. Yes Historical Provider, MD        Social History     Tobacco Use    Smoking status: Former Smoker     Packs/day: 0.50     Years: 12.00     Pack years: 6.00     Types: Cigarettes     Start date: 1/1/1982     Last attempt to quit: 1/1/2005     Years since quitting: 15.7    Smokeless tobacco: Never Used   Substance Use Topics    Alcohol use: Yes     Comment: RARELY        Vitals:    09/28/20 1600   BP: 134/80   Site: Left Upper Arm   Cuff Size: Large Adult   Pulse: 56   Weight: (!) 303 lb 6.4 oz (137.6 kg)   Height: 6' 3\" (1.905 m)     Estimated body mass index is 37.92 kg/m² as calculated from the following:    Height as of this encounter: 6' 3\" (1.905 m). Weight as of this encounter: 303 lb 6.4 oz (137.6 kg). Physical Exam  Constitutional:       General: He is not in acute distress. Appearance: Normal appearance. He is normal weight. He is not ill-appearing. HENT:      Head: Normocephalic and atraumatic. Right Ear: Tympanic membrane, ear canal and external ear normal.      Left Ear: Tympanic membrane, ear canal and external ear normal.      Nose: Nose normal. No congestion or rhinorrhea. Mouth/Throat:      Mouth: Mucous membranes are moist.      Pharynx: Oropharynx is clear. No oropharyngeal exudate or posterior oropharyngeal erythema. Eyes:      Extraocular Movements: Extraocular movements intact. Conjunctiva/sclera: Conjunctivae normal.      Pupils: Pupils are equal, round, and reactive to light. Neck:      Musculoskeletal: Normal range of motion and neck supple.  No muscular tenderness. Vascular: No carotid bruit. Cardiovascular:      Rate and Rhythm: Normal rate and regular rhythm. Pulses: Normal pulses. Heart sounds: No murmur. No friction rub. No gallop. Pulmonary:      Effort: Pulmonary effort is normal. No respiratory distress. Breath sounds: Normal breath sounds. No wheezing, rhonchi or rales. Abdominal:      General: Abdomen is flat. Bowel sounds are normal. There is no distension. Palpations: Abdomen is soft. Tenderness: There is no abdominal tenderness. Musculoskeletal: Normal range of motion. General: No swelling or tenderness. Right lower leg: Edema (1+ pitting) present. Left lower leg: Edema (1+ pitting) present. Lymphadenopathy:      Cervical: No cervical adenopathy. Skin:     General: Skin is warm and dry. Capillary Refill: Capillary refill takes less than 2 seconds. Findings: No erythema or rash. Neurological:      General: No focal deficit present. Mental Status: He is alert and oriented to person, place, and time. Motor: No weakness. Coordination: Coordination normal.      Deep Tendon Reflexes: Reflexes normal.   Psychiatric:         Mood and Affect: Mood normal.         Behavior: Behavior normal.         Thought Content: Thought content normal.         Judgment: Judgment normal.         ASSESSMENT/PLAN:    1. Type 2 diabetes mellitus without complication, unspecified whether long term insulin use (HCC)    - Lipid Panel; Future  - CBC; Future  - Comprehensive Metabolic Panel; Future  - Hemoglobin A1C; Future  - Restart Ozempic 0.5 mg weekly  - Encouraged dietary modifications and exercise regimen to promote optimal glycemic control    Return in about 3 months (around 12/28/2020). An electronic signature was used to authenticate this note.     --LIZZ Lima - CNP on 10/2/2020 at 8:26 AM

## 2020-10-02 ASSESSMENT — ENCOUNTER SYMPTOMS
SINUS PAIN: 0
NAUSEA: 0
ABDOMINAL DISTENTION: 0
TROUBLE SWALLOWING: 0
COUGH: 0
PHOTOPHOBIA: 0
SORE THROAT: 0
BLOOD IN STOOL: 0
DIARRHEA: 0
WHEEZING: 0
SHORTNESS OF BREATH: 0
CONSTIPATION: 0
RHINORRHEA: 0
VOMITING: 0
ABDOMINAL PAIN: 0
SINUS PRESSURE: 0

## 2020-10-13 ENCOUNTER — OFFICE VISIT (OUTPATIENT)
Dept: INTERNAL MEDICINE CLINIC | Age: 60
End: 2020-10-13
Payer: COMMERCIAL

## 2020-10-13 VITALS — HEIGHT: 75 IN | WEIGHT: 302 LBS | TEMPERATURE: 98 F | BODY MASS INDEX: 37.55 KG/M2

## 2020-10-13 PROCEDURE — 97803 MED NUTRITION INDIV SUBSEQ: CPT | Performed by: DIETITIAN, REGISTERED

## 2020-10-13 NOTE — PROGRESS NOTES
exercise. Was off work - and was more physically active at home with fixing up the house - he observed that BSs improved. Pt states his wife is not supportive of his healthy eating plan and will encourage him to eat more food plus she makes desserts. -Main Beverages: water, crystal lite. Denies drinking milk, juice or other sweetened drinks. -Impression of Dietary Intake: on average, 3 meals per day, on average, 1 dining out or fast food meals per week, on average, 0 servings fruit per day, on average, 1-2 servings vegetables per day, high fat/ cholesterol. Current Outpatient Medications on File Prior to Visit   Medication Sig Dispense Refill    Semaglutide (OZEMPIC, 0.25 OR 0.5 MG/DOSE, SC) Inject into the skin once a week      Semaglutide,0.25 or 0.5MG/DOS, (OZEMPIC, 0.25 OR 0.5 MG/DOSE,) 2 MG/1.5ML SOPN Inject 0.5 mg into the skin once a week 12 pen 2    TRESIBA FLEXTOUCH 100 UNIT/ML SOPN INJECT 20 UNITS INTO THE SKIN 2 TIMES DAILY (Patient taking differently: 30 Units ) 5 pen 3    SITagliptin (JANUVIA) 100 MG tablet TAKE 1 TABLET BY MOUTH EVERY DAY 90 tablet 1    lisinopril (PRINIVIL;ZESTRIL) 10 MG tablet TAKE 1 TABLET BY MOUTH EVERY DAY 90 tablet 1    glimepiride (AMARYL) 4 MG tablet TAKE 1 TABLET BY MOUTH TWICE A DAY WITH MEALS 180 tablet 1    metFORMIN (GLUCOPHAGE-XR) 500 MG extended release tablet Taking 1 tablet AM and 1 tablet evening 180 tablet 1    Insulin Pen Needle (B-D UF III MINI PEN NEEDLES) 31G X 5 MM MISC USE 1 ONCE DAILY and as needed Dx: E11.9 200 each 3    blood glucose test strips (ONE TOUCH ULTRA TEST) strip TEST TWO TIMES A DAY Dx: E11.9 200 each 3    atorvastatin (LIPITOR) 40 MG tablet TAKE 1 TABLET BY MOUTH EVERY DAY 90 tablet 3    nystatin-triamcinolone (MYCOLOG II) 425594-4.1 UNIT/GM-% cream Apply topically 2 times daily.  60 g 5    SOFT TOUCH LANCETS MISC 1 Cartridge by Does not apply route 2 times daily 100 each 3    aspirin EC 81 MG EC tablet Take 1 tablet by mouth daily. 30 tablet 3    Acai 25 MG CAPS Take  by mouth daily.  Krill Oil CAPS Take 1 tablet by mouth daily.  Calcium Carb-Cholecalciferol 600-1000 MG-UNIT CAPS Take 1 tablet by mouth daily.  therapeutic multivitamin-minerals (THERAGRAN-M) tablet Take 1 tablet by mouth daily. No current facility-administered medications on file prior to visit. Vitals from current and previous visits:  Temp 98 °F (36.7 °C)   Ht 6' 3\" (1.905 m)   Wt (!) 302 lb (137 kg)   BMI 37.75 kg/m²     -Body mass index is 37.75 kg/m². 35-39.9 - Obesity Grade II.   - Patient maintained.  -Weight goal: lose weight. Nutrition Diagnosis:   Food and nutrition-related knowledge deficit & obesity related to currently undergoing MNT & sedentary lifestyle as evidenced by Body mass index is 37.75 kg/m². , food recall and Elevated BS's. Intervention:  -Instructed the patient on: Exchange System for Carbohydrate Counting & Meal Planning and The Importance of Regular Physical Activity. The rationale for weight loss in improving BS's, see instructions below.     -Handouts given for: comprehensive exchange lists, lean meat vs high fat meat choices, weight loss tips. Patient Instructions   1.)  Take laps around your property before work and again after work or after supper. 2.)  Aim for 4 carb servings (60 gms carbohydrates)/meal  - Add lean protein   - Add non-starchy vegetables. 3.)  Refer to the weight loss tips handout for more ideas to help in your weight loss efforts. 4.)  Good job keeping dessert portions small and Good job drinking water most of the time and crystal lite. 5.) Bring food logging for 1-2 weeks to next dietitian appt. Along with your meter.    -Nutrition prescription: 2000 calories/day, 180 - 200 g carbs/day. Comprehension verified using teachback method.     Monitoring/Evaluation:   -Followup visit: 3 mo with dietitian.   -Receptiveness to education/goals: Agreeable.  -Evaluation of education: Indicates understanding.  -Readiness to change: precontemplative- having wife prepare lean protein at meals along with added non-starchy veggies and contemplation - ambivalent about change walking laps around his property each day. -Expected compliance: fair. Thank you for your referral of this patient. Total time involved in direct patient education: 30 minutes for follow-up MNT visit.

## 2020-10-19 RX ORDER — ATORVASTATIN CALCIUM 40 MG/1
TABLET, FILM COATED ORAL
Qty: 90 TABLET | Refills: 3 | OUTPATIENT
Start: 2020-10-19

## 2020-11-02 RX ORDER — METFORMIN HYDROCHLORIDE 500 MG/1
TABLET, EXTENDED RELEASE ORAL
Qty: 180 TABLET | Refills: 1 | Status: SHIPPED | OUTPATIENT
Start: 2020-11-02 | End: 2020-12-28 | Stop reason: SDUPTHER

## 2020-11-17 RX ORDER — INSULIN DEGLUDEC INJECTION 100 U/ML
30 INJECTION, SOLUTION SUBCUTANEOUS 2 TIMES DAILY
Qty: 45 ML | Refills: 1 | Status: SHIPPED | OUTPATIENT
Start: 2020-11-17 | End: 2021-03-12

## 2020-12-01 RX ORDER — ATORVASTATIN CALCIUM 40 MG/1
TABLET, FILM COATED ORAL
Qty: 90 TABLET | Refills: 3 | OUTPATIENT
Start: 2020-12-01

## 2020-12-18 LAB
AVERAGE GLUCOSE: 263
HBA1C MFR BLD: 10.8 %

## 2020-12-28 ENCOUNTER — OFFICE VISIT (OUTPATIENT)
Dept: INTERNAL MEDICINE CLINIC | Age: 60
End: 2020-12-28
Payer: COMMERCIAL

## 2020-12-28 VITALS
BODY MASS INDEX: 38.44 KG/M2 | DIASTOLIC BLOOD PRESSURE: 88 MMHG | SYSTOLIC BLOOD PRESSURE: 138 MMHG | HEIGHT: 75 IN | TEMPERATURE: 97.4 F | WEIGHT: 309.2 LBS

## 2020-12-28 PROCEDURE — 1036F TOBACCO NON-USER: CPT | Performed by: NURSE PRACTITIONER

## 2020-12-28 PROCEDURE — 3046F HEMOGLOBIN A1C LEVEL >9.0%: CPT | Performed by: NURSE PRACTITIONER

## 2020-12-28 PROCEDURE — G8417 CALC BMI ABV UP PARAM F/U: HCPCS | Performed by: NURSE PRACTITIONER

## 2020-12-28 PROCEDURE — G8427 DOCREV CUR MEDS BY ELIG CLIN: HCPCS | Performed by: NURSE PRACTITIONER

## 2020-12-28 PROCEDURE — 2022F DILAT RTA XM EVC RTNOPTHY: CPT | Performed by: NURSE PRACTITIONER

## 2020-12-28 PROCEDURE — 3017F COLORECTAL CA SCREEN DOC REV: CPT | Performed by: NURSE PRACTITIONER

## 2020-12-28 PROCEDURE — 99214 OFFICE O/P EST MOD 30 MIN: CPT | Performed by: NURSE PRACTITIONER

## 2020-12-28 PROCEDURE — G8484 FLU IMMUNIZE NO ADMIN: HCPCS | Performed by: NURSE PRACTITIONER

## 2020-12-28 RX ORDER — METFORMIN HYDROCHLORIDE 1000 MG/1
TABLET, FILM COATED, EXTENDED RELEASE ORAL
Qty: 90 TABLET | Refills: 1 | Status: SHIPPED | OUTPATIENT
Start: 2020-12-28 | End: 2020-12-30

## 2020-12-28 RX ORDER — GLIMEPIRIDE 4 MG/1
8 TABLET ORAL
Qty: 180 TABLET | Refills: 1 | Status: SHIPPED | OUTPATIENT
Start: 2020-12-28 | End: 2021-03-29 | Stop reason: SDUPTHER

## 2020-12-28 NOTE — PROGRESS NOTES
2020     Buster Chang (:  1960) is a 62 y.o. male, here for evaluation of the following medical concerns: diabetes, hypocalcemia, and urinary urgency     I last seen Addison Barroso 3 months ago. He follows routinely with Dr. Maxwell Lu ER visits or hospitalizations since last visit.      Diabetes-  Diagnosed 7-8 years ago. He states his diabetes is not well controlled.  A1C 10.8% on , was 10.5% previously. Is on Ozempic 0.5 mg weekly, metformin 500 mg BID, glimepiride 4 mg BID, sitagliptin 100 mg daily, and Tresiba 30 units BID. He did not tolerate Invokana. States that he misses oral medications in the evening 2-3 times per week. Is on lisinopril 10 mg daily, with last urinary microalbumin/crt ratio 4.4 in 2018. States hypoglycemic events not present. Is able to voice signs/symptoms of hypoglycemia as dizziness and diaphoresis.  Reports checking glucose 1 time per day, with range of glucose readings 193-352 per meter download. Attempting dietary modification for diabetes management and/or weight loss. Currently follows with diabetes clinic, dietician, CDE. Does report difficulty with obtaining medications. Last eye exam 2019. BP reccs <140/90 (<130/80 in CVD risk). On aspirin 81 mg daily and atorvastatin 40 mg daily.      Denies polyuria, polydipsia, polyphagia. Reports occasionally neuropathic symptoms including numbness, tingling. Does not have any wounds to feet.       Most recent labs in 2020, revealed a calcium of 8.0. He denies any bone fragility. No fractures. Will check ionized calcium. Addison Barroso complains of urinary urgency and frequency. He states that he has the urge to urinate every hour. He denies any hematuria. No specific aggravating or alleviating factors. Review of Systems   Constitutional: Negative for chills, fatigue and fever. HENT: Negative for congestion, rhinorrhea, sinus pressure, sinus pain, sore throat, tinnitus and trouble swallowing.     Eyes: Negative for photophobia and visual disturbance. Respiratory: Negative for cough, shortness of breath and wheezing. Cardiovascular: Negative for chest pain, palpitations and leg swelling. Gastrointestinal: Negative for abdominal distention, abdominal pain, blood in stool, constipation, diarrhea, nausea and vomiting. Endocrine: Negative for polydipsia, polyphagia and polyuria. Genitourinary: Positive for frequency and urgency. Negative for difficulty urinating, dysuria and hematuria. Musculoskeletal: Negative for arthralgias and myalgias. Skin: Negative for rash and wound. Neurological: Negative for dizziness, light-headedness and headaches. Psychiatric/Behavioral: Negative for dysphoric mood and sleep disturbance. The patient is not nervous/anxious. Prior to Visit Medications    Medication Sig Taking?  Authorizing Provider   metFORMIN (GLUMETZA) 1000 MG extended release tablet Taking 1 tablet AM and 1 tablet evening Yes LIZZ Hyde CNP   glimepiride (AMARYL) 4 MG tablet Take 2 tablets by mouth every morning (before breakfast) Yes LIZZ Hyde CNP   Insulin Degludec (TRESIBA FLEXTOUCH) 100 UNIT/ML SOPN Inject 30 Units into the skin 2 times daily Yes LIZZ Hyde CNP   Semaglutide,0.25 or 0.5MG/DOS, (OZEMPIC, 0.25 OR 0.5 MG/DOSE,) 2 MG/1.5ML SOPN Inject 0.5 mg into the skin once a week Yes LIZZ Hyde CNP   SITagliptin (JANUVIA) 100 MG tablet TAKE 1 TABLET BY MOUTH EVERY DAY Yes LIZZ Hyde CNP   lisinopril (PRINIVIL;ZESTRIL) 10 MG tablet TAKE 1 TABLET BY MOUTH EVERY DAY Yes Juliana CarmonaskLIZZ Martinez CNP   Insulin Pen Needle (B-D UF III MINI PEN NEEDLES) 31G X 5 MM MISC USE 1 ONCE DAILY and as needed Dx: E11.9 Yes LIZZ Aj CNP   blood glucose test strips (ONE TOUCH ULTRA TEST) strip TEST TWO TIMES A DAY Dx: E11.9 Yes LIZZ Douglas CNP   atorvastatin (LIPITOR) 40 MG tablet TAKE 1 TABLET BY MOUTH EVERY DAY Yes Jeromy Zuniga MD   nystatin-triamcinolone [facility] II) 903942-5.0 UNIT/GM-% cream Apply topically 2 times daily. Yes Jeromy Zuniga MD   SOFT TOUCH LANCETS MISC 1 Cartridge by Does not apply route 2 times daily Yes Jeromy Zuniga MD   aspirin EC 81 MG EC tablet Take 1 tablet by mouth daily. Yes Jeromy Zuniga MD   Acai 25 MG CAPS Take  by mouth daily. Yes Historical Provider, MD   Candy Linea CAPS Take 1 tablet by mouth daily. Yes Historical Provider, MD   Calcium Carb-Cholecalciferol 600-1000 MG-UNIT CAPS Take 1 tablet by mouth daily. Yes Historical Provider, MD   therapeutic multivitamin-minerals (THERAGRAN-M) tablet Take 1 tablet by mouth daily. Yes Historical Provider, MD        Social History     Tobacco Use    Smoking status: Former Smoker     Packs/day: 0.50     Years: 12.00     Pack years: 6.00     Types: Cigarettes     Start date: 1982     Quit date: 2005     Years since quittin.0    Smokeless tobacco: Never Used   Substance Use Topics    Alcohol use: Yes     Comment: RARELY        Vitals:    20 1539   BP: 138/88   Site: Left Upper Arm   Cuff Size: Large Adult   Temp: 97.4 °F (36.3 °C)   TempSrc: Temporal   Weight: (!) 309 lb 3.2 oz (140.3 kg)   Height: 6' 3\" (1.905 m)     Estimated body mass index is 38.65 kg/m² as calculated from the following:    Height as of this encounter: 6' 3\" (1.905 m). Weight as of this encounter: 309 lb 3.2 oz (140.3 kg). Physical Exam  Constitutional:       General: He is not in acute distress. Appearance: Normal appearance. He is normal weight. He is not ill-appearing. HENT:      Head: Normocephalic and atraumatic. Right Ear: Tympanic membrane, ear canal and external ear normal.      Left Ear: Tympanic membrane, ear canal and external ear normal.      Nose: Nose normal. No congestion or rhinorrhea. Mouth/Throat:      Mouth: Mucous membranes are moist.      Pharynx: Oropharynx is clear.  No oropharyngeal exudate or posterior (before breakfast)  Dispense: 180 tablet; Refill: 1  - Take metformin and glimepiride to morning only  - If in 1 week blood sugar is not under 150, increase Tresiba to 35 units twice daily  - Call in 2 weeks with blood sugar readings   - Encouraged dietary modifications and exercise regimen to promote optimal glycemic control    2. Frequency of urination    - PSA Prostatic Specific Antigen; Future    3. Hypocalcemia    - Calcium, Ionized; Future    4. Urgency of urination    - Urinalysis With Microscopic; Future      Return in about 3 months (around 3/28/2021). An electronic signature was used to authenticate this note.     --Cecille Duverney, APRN - CNP on 12/29/2020 at 4:22 PM

## 2020-12-28 NOTE — PATIENT INSTRUCTIONS
Change metformin and glimepiride to morning only    If in 1 week blood sugar is not under 150, increase Tresiba to 35 units twice daily    Call in 2 weeks with blood sugar readings [phone]    [facility]     Blood work

## 2020-12-29 LAB
CREATININE, URINE: 126.8
MICROALBUMIN/CREAT 24H UR: 10.8 MG/G{CREAT}
MICROALBUMIN/CREAT UR-RTO: 8.5

## 2020-12-29 ASSESSMENT — ENCOUNTER SYMPTOMS
PHOTOPHOBIA: 0
NAUSEA: 0
ABDOMINAL DISTENTION: 0
SINUS PAIN: 0
VOMITING: 0
ABDOMINAL PAIN: 0
DIARRHEA: 0
WHEEZING: 0
COUGH: 0
SINUS PRESSURE: 0
RHINORRHEA: 0
SHORTNESS OF BREATH: 0
BLOOD IN STOOL: 0
SORE THROAT: 0
TROUBLE SWALLOWING: 0
CONSTIPATION: 0

## 2020-12-30 ENCOUNTER — TELEPHONE (OUTPATIENT)
Dept: INTERNAL MEDICINE CLINIC | Age: 60
End: 2020-12-30

## 2020-12-30 RX ORDER — ATORVASTATIN CALCIUM 40 MG/1
TABLET, FILM COATED ORAL
Qty: 90 TABLET | Refills: 3 | OUTPATIENT
Start: 2020-12-30

## 2020-12-30 RX ORDER — METFORMIN HYDROCHLORIDE 500 MG/1
1000 TABLET, EXTENDED RELEASE ORAL 2 TIMES DAILY
Qty: 120 TABLET | Refills: 5 | Status: SHIPPED | OUTPATIENT
Start: 2020-12-30 | End: 2021-06-29

## 2020-12-30 RX ORDER — BLOOD SUGAR DIAGNOSTIC
STRIP MISCELLANEOUS
Qty: 200 STRIP | Refills: 3 | OUTPATIENT
Start: 2020-12-30

## 2020-12-30 NOTE — TELEPHONE ENCOUNTER
PSA is normal, urinalysis is normal. Lets refer him to urology for evaluation of urinary urgency and frequency.

## 2021-01-12 RX ORDER — ATORVASTATIN CALCIUM 40 MG/1
TABLET, FILM COATED ORAL
Qty: 90 TABLET | Refills: 3 | Status: SHIPPED | OUTPATIENT
Start: 2021-01-12 | End: 2021-09-29 | Stop reason: SDUPTHER

## 2021-01-14 ENCOUNTER — OFFICE VISIT (OUTPATIENT)
Dept: UROLOGY | Age: 61
End: 2021-01-14
Payer: COMMERCIAL

## 2021-01-14 VITALS — TEMPERATURE: 97.5 F | HEIGHT: 75 IN | BODY MASS INDEX: 37.55 KG/M2 | WEIGHT: 302 LBS

## 2021-01-14 DIAGNOSIS — R35.0 URINARY FREQUENCY: Primary | ICD-10-CM

## 2021-01-14 LAB
BILIRUBIN URINE: NEGATIVE
BLOOD URINE, POC: NEGATIVE
CHARACTER, URINE: CLEAR
COLOR, URINE: YELLOW
GLUCOSE URINE: 500 MG/DL
KETONES, URINE: NEGATIVE
LEUKOCYTE CLUMPS, URINE: NEGATIVE
NITRITE, URINE: NEGATIVE
PH, URINE: 5 (ref 5–9)
POST VOID RESIDUAL (PVR): 0 ML
PROTEIN, URINE: NEGATIVE MG/DL
SPECIFIC GRAVITY, URINE: >= 1.03 (ref 1–1.03)
UROBILINOGEN, URINE: 0.2 EU/DL (ref 0–1)

## 2021-01-14 PROCEDURE — 3017F COLORECTAL CA SCREEN DOC REV: CPT | Performed by: UROLOGY

## 2021-01-14 PROCEDURE — 81003 URINALYSIS AUTO W/O SCOPE: CPT | Performed by: UROLOGY

## 2021-01-14 PROCEDURE — 99204 OFFICE O/P NEW MOD 45 MIN: CPT | Performed by: UROLOGY

## 2021-01-14 PROCEDURE — G8484 FLU IMMUNIZE NO ADMIN: HCPCS | Performed by: UROLOGY

## 2021-01-14 PROCEDURE — G8427 DOCREV CUR MEDS BY ELIG CLIN: HCPCS | Performed by: UROLOGY

## 2021-01-14 PROCEDURE — 1036F TOBACCO NON-USER: CPT | Performed by: UROLOGY

## 2021-01-14 PROCEDURE — 51798 US URINE CAPACITY MEASURE: CPT | Performed by: UROLOGY

## 2021-01-14 PROCEDURE — G8417 CALC BMI ABV UP PARAM F/U: HCPCS | Performed by: UROLOGY

## 2021-01-14 RX ORDER — TROSPIUM CHLORIDE 20 MG/1
20 TABLET, FILM COATED ORAL 2 TIMES DAILY
Qty: 60 TABLET | Refills: 3 | Status: SHIPPED | OUTPATIENT
Start: 2021-01-14 | End: 2021-06-17 | Stop reason: SDUPTHER

## 2021-01-14 ASSESSMENT — ENCOUNTER SYMPTOMS
EYE PAIN: 0
EYE REDNESS: 0
DIARRHEA: 0
SHORTNESS OF BREATH: 0
CONSTIPATION: 0
CHEST TIGHTNESS: 0
BACK PAIN: 0

## 2021-01-14 NOTE — PROGRESS NOTES
[address]  [address]  [facility] [address]  Dept: [phone]  Loc: [phone]  Visit Date: 2021    Subjective:      Patient ID: Marsha George 62 y.o. male 1960    Chief Complaint   Patient presents with    Advice Only     new patient, urinary frequency       HPI  60-year-old white male referred for lower urinary tract symptoms. For approximately 1 year he has noticed increasing frequency, urgency, nocturia. The urinary flow is slower than it used to be but not a particular bother. It should be noted he has been diabetic for over 10 years and his hemoglobin A1c recently was 10.8%. He denies kidney stones, recurrent UTI, or hematuria. He does have erectile dysfunction. He has tried 5 PDE inhibitors but he suffered significant vascular headaches. He recently had a MILE and PSA both of which were reportedly normal.  There is no family history of prostate cancer. Past Medical History:   Diagnosis Date    [facility] ([facility]     Diabetes mellitus out of control ([facility]     Glucose intolerance (impaired glucose tolerance)     HTN (hypertension)     Hyperlipidemia        Social History     Socioeconomic History    Marital status:      Spouse name: Not on file    Number of children: Not on file    Years of education: Not on file    Highest education level: Not on file   Occupational History    Not on file   Social Needs    Financial resource strain: Not on file    Food insecurity     Worry: Not on file     Inability: Not on file    Transportation needs     Medical: Not on file     Non-medical: Not on file   Tobacco Use    Smoking status: Former Smoker     Packs/day: 0.50     Years: 12.00     Pack years: 6.00     Types: Cigarettes     Start date: 1982     Quit date: 2005     Years since quittin.0    Smokeless tobacco: Never Used   Substance and Sexual Activity    Alcohol use:  Yes Comment: RARELY    Drug use: No    Sexual activity: Not on file   Lifestyle    Physical activity     Days per week: Not on file     Minutes per session: Not on file    Stress: Not on file   Relationships    Social connections     Talks on phone: Not on file     Gets together: Not on file     Attends Protestant service: Not on file     Active member of club or organization: Not on file     Attends meetings of clubs or organizations: Not on file     Relationship status: Not on file    Intimate partner violence     Fear of current or ex partner: Not on file     Emotionally abused: Not on file     Physically abused: Not on file     Forced sexual activity: Not on file   Other Topics Concern    Not on file   Social History Narrative    Not on file       Family History   Problem Relation Age of Onset    Diabetes Mother     High Blood Pressure Mother     Cancer Mother         breast, skin    Heart Disease Father     Stroke Father        Past Surgical History:   Procedure Laterality Date    APPENDECTOMY  2006    COLECTOMY  05/23/2017    Robotic sigmoid     COLONOSCOPY  04/28/2017    Nancy Blevins COLONOSCOPY  11/2017    Dr. Melvin Andres 1 yr follow-up     KNEE ARTHROSCOPY  12/7/11       Right lateral meniscectomy    LIPOMA RESECTION  2007    forehead 2007 Dr Thurlow Krabbe      as a child       Allergies   Allergen Reactions    Zithromax [Azithromycin Dihydrate] Anaphylaxis     Throat swelling         Current Outpatient Medications:     atorvastatin (LIPITOR) 40 MG tablet, TAKE 1 TABLET BY MOUTH EVERY DAY, Disp: 90 tablet, Rfl: 3    metFORMIN (GLUCOPHAGE-XR) 500 MG extended release tablet, Take 2 tablets by mouth 2 times daily, Disp: 120 tablet, Rfl: 5    glimepiride (AMARYL) 4 MG tablet, Take 2 tablets by mouth every morning (before breakfast), Disp: 180 tablet, Rfl: 1    Insulin Degludec (TRESIBA FLEXTOUCH) 100 UNIT/ML SOPN, Inject 30 Units into the skin 2 times daily, Disp: 45 mL, Rfl: 1 Hematological: Does not bruise/bleed easily. Temp 97.5 °F (36.4 °C)   Ht 6' 3\" (1.905 m)   Wt (!) 302 lb (137 kg)   BMI 37.75 kg/m²     Objective:   Physical Exam  Vitals signs reviewed. Constitutional:       Appearance: He is well-developed. He is obese. HENT:      Head: Normocephalic and atraumatic. Right Ear: External ear normal.      Left Ear: External ear normal.   Eyes:      Extraocular Movements: Extraocular movements intact. Conjunctiva/sclera: Conjunctivae normal.      Pupils: Pupils are equal, round, and reactive to light. Abdominal:      General: Bowel sounds are normal. There is no distension. Palpations: Abdomen is soft. There is no mass. Tenderness: There is no abdominal tenderness. There is no right CVA tenderness, left CVA tenderness or guarding. Hernia: No hernia is present. Genitourinary:     Penis: Normal.       Testes: Normal.   Skin:     General: Skin is warm and dry. Neurological:      Mental Status: He is alert and oriented to person, place, and time. Psychiatric:         Mood and Affect: Mood normal.         Behavior: Behavior normal.         Assessment:       Diagnosis Orders   1. Urinary frequency  poct post void residual    POCT Urinalysis No Micro (Auto)       Mr. Shalom Pastor today in consultation for Urinary frequency [R35.0]. Patient has 2 significant urologic problems. Lower urinary tract symptoms and ED. Part of his LUTS may be due to his uncontrolled diabetes. Nevertheless he is at the age where BPH with urinary symptoms is quite common. He has failed 5 PDE inhibitors. I discussed vacuum erection device, self injection therapy with prostaglandin E1. He will consider both of these. Plan:        I am starting him on Sanctura 20 mg p.o. twice daily. I discussed common side effects.

## 2021-02-11 ENCOUNTER — OFFICE VISIT (OUTPATIENT)
Dept: UROLOGY | Age: 61
End: 2021-02-11
Payer: COMMERCIAL

## 2021-02-11 VITALS — WEIGHT: 300 LBS | HEIGHT: 75 IN | TEMPERATURE: 97.3 F | BODY MASS INDEX: 37.3 KG/M2

## 2021-02-11 DIAGNOSIS — R35.0 URINARY FREQUENCY: Primary | ICD-10-CM

## 2021-02-11 LAB
BILIRUBIN URINE: NEGATIVE
BLOOD URINE, POC: NEGATIVE
CHARACTER, URINE: CLEAR
COLOR, URINE: YELLOW
GLUCOSE URINE: 500 MG/DL
KETONES, URINE: NEGATIVE
LEUKOCYTE CLUMPS, URINE: NEGATIVE
NITRITE, URINE: NEGATIVE
PH, URINE: 6 (ref 5–9)
PROTEIN, URINE: NEGATIVE MG/DL
SPECIFIC GRAVITY, URINE: 1.02 (ref 1–1.03)
UROBILINOGEN, URINE: 0.2 EU/DL (ref 0–1)

## 2021-02-11 PROCEDURE — 3017F COLORECTAL CA SCREEN DOC REV: CPT | Performed by: UROLOGY

## 2021-02-11 PROCEDURE — G8417 CALC BMI ABV UP PARAM F/U: HCPCS | Performed by: UROLOGY

## 2021-02-11 PROCEDURE — G8427 DOCREV CUR MEDS BY ELIG CLIN: HCPCS | Performed by: UROLOGY

## 2021-02-11 PROCEDURE — 99212 OFFICE O/P EST SF 10 MIN: CPT | Performed by: UROLOGY

## 2021-02-11 PROCEDURE — 81003 URINALYSIS AUTO W/O SCOPE: CPT | Performed by: UROLOGY

## 2021-02-11 PROCEDURE — 1036F TOBACCO NON-USER: CPT | Performed by: UROLOGY

## 2021-02-11 PROCEDURE — G8484 FLU IMMUNIZE NO ADMIN: HCPCS | Performed by: UROLOGY

## 2021-02-11 RX ORDER — ALPROSTADIL 20.5 UG/ML
10 INJECTION, POWDER, LYOPHILIZED, FOR SOLUTION INTRACAVERNOUS
Qty: 1 EACH | Refills: 0 | Status: SHIPPED | OUTPATIENT
Start: 2021-02-11 | End: 2021-06-29

## 2021-02-11 NOTE — PROGRESS NOTES
Doing much better with sanctura 20 mg BID and no adverse effects. Regarding the erectile dysfunction he would like to proceed with prostaglandin E1 self injection therapy. I reviewed the technique, expense of the medication, different preparations (cartridge versus vial from a compound pharmacy). I prescribed Caverject 10 mcg x 2. He will pick these up at his pharmacy and return for teaching and test dosing.

## 2021-02-22 ENCOUNTER — TELEPHONE (OUTPATIENT)
Dept: UROLOGY | Age: 61
End: 2021-02-22

## 2021-02-22 NOTE — TELEPHONE ENCOUNTER
Prior Auth initiated for Caverject . PA sent to covermymeds/med [employer].  Should receive response in 3-5 days.

## 2021-02-22 NOTE — TELEPHONE ENCOUNTER
Caverject does not require a prior auth. Copy of this was sent to [facility] pharmacy on [address]. Radha Pedraza.

## 2021-02-25 DIAGNOSIS — E11.9 TYPE 2 DIABETES MELLITUS WITHOUT COMPLICATION, UNSPECIFIED WHETHER LONG TERM INSULIN USE (HCC): ICD-10-CM

## 2021-02-26 RX ORDER — BLOOD SUGAR DIAGNOSTIC
STRIP MISCELLANEOUS
Qty: 200 STRIP | Refills: 3 | Status: SHIPPED | OUTPATIENT
Start: 2021-02-26 | End: 2022-05-31

## 2021-03-01 ENCOUNTER — TELEPHONE (OUTPATIENT)
Dept: UROLOGY | Age: 61
End: 2021-03-01

## 2021-03-01 NOTE — TELEPHONE ENCOUNTER
Talked with Bill Manuel pharmacist from [facility] [place] [facility] and she said the insurance will cover powder only. Not the duel injection syringes. With patient giving this himself it was advised against. Talked with Dr Zelda Aguilar and he changed order to Trimix. Ivone Clement given all info to order Trimix.

## 2021-03-01 NOTE — TELEPHONE ENCOUNTER
[facility] pharmacy called and said Caverject still would not go through. Called Russ from [address] who said it is going through on her end. I called [facility] pharmacy back and told them [facility] from [address] said to call them it possibly has to do with the [address] number they are using.

## 2021-03-05 ENCOUNTER — TELEPHONE (OUTPATIENT)
Dept: UROLOGY | Age: 61
End: 2021-03-05

## 2021-03-05 NOTE — TELEPHONE ENCOUNTER
[facility] pharmacy called and L/M that this patient's Caverjet kit needs prior authed. The number to call is [phone] she said. Any questions call the pharmacy.   thanks

## 2021-03-08 NOTE — TELEPHONE ENCOUNTER
Called [facility] pharmacy and let them know that Caverject Impulse was denied by insurance.

## 2021-03-08 NOTE — TELEPHONE ENCOUNTER
Notified patient about the Caverject Impulse denial and discussed with him about trimix. Patient said he will think about it and let us know.

## 2021-03-11 DIAGNOSIS — E11.9 TYPE 2 DIABETES MELLITUS WITHOUT COMPLICATION, UNSPECIFIED WHETHER LONG TERM INSULIN USE (HCC): ICD-10-CM

## 2021-03-12 RX ORDER — INSULIN DEGLUDEC INJECTION 100 U/ML
30 INJECTION, SOLUTION SUBCUTANEOUS 2 TIMES DAILY
Qty: 60 ML | Refills: 1 | Status: SHIPPED | OUTPATIENT
Start: 2021-03-12 | End: 2021-06-29 | Stop reason: SDUPTHER

## 2021-03-13 DIAGNOSIS — I10 ESSENTIAL HYPERTENSION: ICD-10-CM

## 2021-03-15 RX ORDER — LISINOPRIL 10 MG/1
TABLET ORAL
Qty: 90 TABLET | Refills: 1 | Status: SHIPPED | OUTPATIENT
Start: 2021-03-15 | End: 2021-06-29 | Stop reason: SDUPTHER

## 2021-03-29 ENCOUNTER — OFFICE VISIT (OUTPATIENT)
Dept: INTERNAL MEDICINE CLINIC | Age: 61
End: 2021-03-29
Payer: COMMERCIAL

## 2021-03-29 ENCOUNTER — TELEPHONE (OUTPATIENT)
Dept: INTERNAL MEDICINE CLINIC | Age: 61
End: 2021-03-29

## 2021-03-29 VITALS
DIASTOLIC BLOOD PRESSURE: 74 MMHG | WEIGHT: 307 LBS | TEMPERATURE: 97.4 F | HEIGHT: 75 IN | BODY MASS INDEX: 38.17 KG/M2 | SYSTOLIC BLOOD PRESSURE: 138 MMHG

## 2021-03-29 DIAGNOSIS — I10 ESSENTIAL HYPERTENSION: ICD-10-CM

## 2021-03-29 DIAGNOSIS — E11.9 TYPE 2 DIABETES MELLITUS WITHOUT COMPLICATION, UNSPECIFIED WHETHER LONG TERM INSULIN USE (HCC): Primary | ICD-10-CM

## 2021-03-29 DIAGNOSIS — R39.15 URINARY URGENCY: ICD-10-CM

## 2021-03-29 DIAGNOSIS — R07.9 CHEST PAIN, UNSPECIFIED TYPE: ICD-10-CM

## 2021-03-29 DIAGNOSIS — E83.51 HYPOCALCEMIA: ICD-10-CM

## 2021-03-29 LAB — HBA1C MFR BLD: 9.2 % (ref 4.3–5.7)

## 2021-03-29 PROCEDURE — G8484 FLU IMMUNIZE NO ADMIN: HCPCS | Performed by: NURSE PRACTITIONER

## 2021-03-29 PROCEDURE — 3046F HEMOGLOBIN A1C LEVEL >9.0%: CPT | Performed by: NURSE PRACTITIONER

## 2021-03-29 PROCEDURE — 2022F DILAT RTA XM EVC RTNOPTHY: CPT | Performed by: NURSE PRACTITIONER

## 2021-03-29 PROCEDURE — 3017F COLORECTAL CA SCREEN DOC REV: CPT | Performed by: NURSE PRACTITIONER

## 2021-03-29 PROCEDURE — G8427 DOCREV CUR MEDS BY ELIG CLIN: HCPCS | Performed by: NURSE PRACTITIONER

## 2021-03-29 PROCEDURE — G8417 CALC BMI ABV UP PARAM F/U: HCPCS | Performed by: NURSE PRACTITIONER

## 2021-03-29 PROCEDURE — 83036 HEMOGLOBIN GLYCOSYLATED A1C: CPT | Performed by: NURSE PRACTITIONER

## 2021-03-29 PROCEDURE — 99214 OFFICE O/P EST MOD 30 MIN: CPT | Performed by: NURSE PRACTITIONER

## 2021-03-29 PROCEDURE — 93000 ELECTROCARDIOGRAM COMPLETE: CPT | Performed by: NURSE PRACTITIONER

## 2021-03-29 PROCEDURE — 1036F TOBACCO NON-USER: CPT | Performed by: NURSE PRACTITIONER

## 2021-03-29 RX ORDER — GLIMEPIRIDE 4 MG/1
8 TABLET ORAL
Qty: 180 TABLET | Refills: 1 | Status: SHIPPED | OUTPATIENT
Start: 2021-03-29 | End: 2021-06-29 | Stop reason: SDUPTHER

## 2021-03-29 NOTE — PROGRESS NOTES
[facility]  Dinesh Georges  Dept: [phone]  Dept Fax: [phone]: [phone]     Visit Date:  3/29/2021    Patient:  Antonia Schwab  YOB: 1960    HPI:     Chief Complaint   Patient presents with    3 Month Follow-Up    Diabetes     Antonia Schwab (:  1960) is a 62 y.o. male, here for evaluation of the following medical concerns: diabetes, hypocalcemia,  urinary urgency, chest pain/palpitations     I last seen Robbie 3 months ago. He follows routinely with Dr. Neelam Alcantar ER visits or hospitalizations since last visit.      Diabetes-  Diagnosed 7-8 years ago. He states his diabetes is not well controlled.  A1C 9.2% today in office, was 10.8% on . Is on Ozempic 0.5 mg weekly, metformin 1000 mg daily, glimepiride 4 mg BID, sitagliptin 100 mg daily, and Tresiba 35 units BID. He did not tolerate Invokana. States that he misses oral medications occasionally. Is on lisinopril 10 mg daily, with last urinary microalbumin/crt ratio 8.5 in 2020. States hypoglycemic events not present. Is able to voice signs/symptoms of hypoglycemia as dizziness and diaphoresis.  Reports checking glucose 1 time per day, with range of glucose readings 144-259 per meter download. Attempting dietary modification for diabetes management and/or weight loss. Currently follows with diabetes clinic, dietician, CDE. Does report difficulty with obtaining medications. Last eye exam 2020. BP reccs <140/90 (<130/80 in CVD risk). On aspirin 81 mg daily and atorvastatin 40 mg daily.      Denies polyuria, polydipsia, polyphagia. Reports occasionally neuropathic symptoms including numbness, tingling. Does not have any wounds to feet.        Most recent labs in 2020, revealed a calcium of 8.0. He denies any bone fragility. No fractures. Ionized calcium 1.23 in 2020.  He is on calcium 600 mg daily.     Is following with urology for urinary issues and ED. Vicente Shah complains of feeling like his heart is racing occasionally. Approximately 2 times in the last 3 months. Symptoms last approximately 15-20 min. No specific aggravating or alleviating factors. He also reports a dull ache in his left chest prior to palpitations. No associated shortness of breath. BP today in office 138/74. EKG completed- WNL. Last stress test 2 years ago at [facility] with Dr. Earline Johnson. Will obtain records. He has had increased anxiety at work and home.      Medications    Current Outpatient Medications:     glimepiride (AMARYL) 4 MG tablet, Take 2 tablets by mouth every morning (before breakfast), Disp: 180 tablet, Rfl: 1    metFORMIN (GLUCOPHAGE) 1000 MG tablet, Take 1 tablet by mouth 2 times daily (with meals), Disp: 180 tablet, Rfl: 1    lisinopril (PRINIVIL;ZESTRIL) 10 MG tablet, TAKE 1 TABLET BY MOUTH EVERY DAY, Disp: 90 tablet, Rfl: 1    TRESIBA FLEXTOUCH 100 UNIT/ML SOPN, INJECT 30 UNITS INTO THE SKIN 2 TIMES DAILY (Patient taking differently: Inject 35 Units into the skin 2 times daily ), Disp: 60 mL, Rfl: 1    blood glucose test strips (ONETOUCH ULTRA) strip, TEST TWICE A DAY, Disp: 200 strip, Rfl: 3    trospium (SANCTURA) 20 MG tablet, Take 1 tablet by mouth 2 times daily, Disp: 60 tablet, Rfl: 3    atorvastatin (LIPITOR) 40 MG tablet, TAKE 1 TABLET BY MOUTH EVERY DAY, Disp: 90 tablet, Rfl: 3    metFORMIN (GLUCOPHAGE-XR) 500 MG extended release tablet, Take 2 tablets by mouth 2 times daily, Disp: 120 tablet, Rfl: 5    Semaglutide,0.25 or 0.5MG/DOS, (OZEMPIC, 0.25 OR 0.5 MG/DOSE,) 2 MG/1.5ML SOPN, Inject 0.5 mg into the skin once a week, Disp: 12 pen, Rfl: 2    SITagliptin (JANUVIA) 100 MG tablet, TAKE 1 TABLET BY MOUTH EVERY DAY, Disp: 90 tablet, Rfl: 1    Insulin Pen Needle (B-D UF III MINI PEN NEEDLES) 31G X 5 MM MISC, USE 1 ONCE DAILY and as needed Dx: E11.9, Disp: 200 each, Rfl: 3    nystatin-triamcinolone (MYCOLOG II) 102791-1.1 UNIT/GM-% cream, Apply topically 2 times daily. , Disp: 60 g, Rfl: 5    SOFT TOUCH LANCETS MISC, 1 Cartridge by Does not apply route 2 times daily, Disp: 100 each, Rfl: 3    aspirin EC 81 MG EC tablet, Take 1 tablet by mouth daily. , Disp: 30 tablet, Rfl: 3    Acai 25 MG CAPS, Take  by mouth daily. , Disp: , Rfl:     Krill Oil CAPS, Take 1 tablet by mouth daily. , Disp: , Rfl:     Calcium Carb-Cholecalciferol 600-1000 MG-UNIT CAPS, Take 1 tablet by mouth daily. , Disp: , Rfl:     therapeutic multivitamin-minerals (THERAGRAN-M) tablet, Take 1 tablet by mouth daily. , Disp: , Rfl:     alprostadil, Vasodilator, (CAVERJECT) 20 MCG SOLR intracavernosal injection, 10 mcg by Intracavernosal route Twice a Week for 2 doses, Disp: 1 each, Rfl: 0    The patient is allergic to zithromax [azithromycin dihydrate]. Past Medical History  Lenora Mcgraw  has a past medical history of Diabetes mellitus ([address] Diabetes mellitus out of control ([address] Glucose intolerance (impaired glucose tolerance), HTN (hypertension), and Hyperlipidemia. Past Surgical History  The patient  has a past surgical history that includes lipoma resection (2007); Appendectomy (2006); Knee arthroscopy (12/7/11); Tonsillectomy; Colonoscopy (04/28/2017); colectomy (05/23/2017); and Colonoscopy (11/2017). Family History  This patient's family history includes Cancer in his mother; Diabetes in his mother; Heart Disease in his father; High Blood Pressure in his mother; Stroke in his father. Social History  Lenora Mcgraw  reports that he quit smoking about 16 years ago. His smoking use included cigarettes. He started smoking about 39 years ago. He has a 6.00 pack-year smoking history. He has never used smokeless tobacco. He reports current alcohol use. He reports that he does not use drugs.     Health Maintenance:    Health Maintenance   Topic Date Due    Pneumococcal 0-64 years Vaccine (1 of 1 - PPSV23) Never done    HIV screen  Never done  COVID-19 Vaccine (1) Never done    Hepatitis B vaccine (1 of 3 - Risk 3-dose series) Never done    DTaP/Tdap/Td vaccine (1 - Tdap) Never done    Shingles Vaccine (1 of 2) Never done    Diabetic foot exam  12/06/2019    Diabetic retinal exam  12/06/2020    A1C test (Diabetic or Prediabetic)  06/29/2021    Flu vaccine (Season Ended) 09/01/2021    Diabetic microalbuminuria test  12/29/2021    Lipid screen  04/17/2022    Potassium monitoring  04/17/2022    Creatinine monitoring  04/17/2022    Colon cancer screen colonoscopy  01/06/2030    Hepatitis C screen  Completed    Hepatitis A vaccine  Aged Out    Hib vaccine  Aged Out    Meningococcal (ACWY) vaccine  Aged Out       Subjective:      Review of Systems   Constitutional: Negative for chills, fatigue and fever. HENT: Negative for congestion, rhinorrhea, sinus pressure, sinus pain, sore throat, tinnitus and trouble swallowing. Eyes: Negative for photophobia and visual disturbance. Respiratory: Negative for cough, shortness of breath and wheezing. Cardiovascular: Positive for chest pain and palpitations. Negative for leg swelling. Gastrointestinal: Negative for abdominal distention, abdominal pain, blood in stool, constipation, diarrhea, nausea and vomiting. Endocrine: Negative for polydipsia, polyphagia and polyuria. Genitourinary: Negative for difficulty urinating, dysuria, frequency, hematuria and urgency. Musculoskeletal: Negative for myalgias. Skin: Negative for rash and wound. Neurological: Negative for dizziness, light-headedness and headaches. Psychiatric/Behavioral: Negative for dysphoric mood and sleep disturbance. The patient is not nervous/anxious. Objective:     /74 (Site: Left Upper Arm, Cuff Size: Large Adult)   Temp 97.4 °F (36.3 °C) (Temporal)   Ht 6' 3\" (1.905 m)   Wt (!) 307 lb (139.3 kg)   BMI 38.37 kg/m²     Physical Exam  Constitutional:       General: He is not in acute distress. Appearance: Normal appearance. He is normal weight. He is not ill-appearing. HENT:      Head: Normocephalic and atraumatic. Right Ear: Tympanic membrane, ear canal and external ear normal.      Left Ear: Tympanic membrane, ear canal and external ear normal.      Nose: Nose normal. No congestion or rhinorrhea. Mouth/Throat:      Mouth: Mucous membranes are moist.      Pharynx: Oropharynx is clear. No oropharyngeal exudate or posterior oropharyngeal erythema. Eyes:      Extraocular Movements: Extraocular movements intact. Conjunctiva/sclera: Conjunctivae normal.      Pupils: Pupils are equal, round, and reactive to light. Neck:      Musculoskeletal: Normal range of motion and neck supple. No muscular tenderness. Vascular: No carotid bruit. Cardiovascular:      Rate and Rhythm: Normal rate and regular rhythm. Pulses: Normal pulses. Heart sounds: No murmur. No friction rub. No gallop. Pulmonary:      Effort: Pulmonary effort is normal. No respiratory distress. Breath sounds: Normal breath sounds. No wheezing, rhonchi or rales. Abdominal:      General: Abdomen is flat. Bowel sounds are normal. There is no distension. Palpations: Abdomen is soft. Tenderness: There is no abdominal tenderness. Musculoskeletal: Normal range of motion. General: No swelling or tenderness. Right lower leg: Edema (1+ pitting) present. Left lower leg: Edema (1+ pitting) present. Lymphadenopathy:      Cervical: No cervical adenopathy. Skin:     General: Skin is warm and dry. Capillary Refill: Capillary refill takes less than 2 seconds. Findings: No erythema or rash. Neurological:      General: No focal deficit present. Mental Status: He is alert and oriented to person, place, and time. Motor: No weakness.       Coordination: Coordination normal.      Deep Tendon Reflexes: Reflexes normal.   Psychiatric:         Mood and Affect: Mood normal. EDT

## 2021-03-29 NOTE — TELEPHONE ENCOUNTER
Per Inna Hart call Dr. Maira Quijano office and see if they will schedule patient an appointment to follow up on chest pain    Scheduled with Dr. Kavita Clark 4/6/21 @ 11:15   [address]    Patients wife given appointment information.

## 2021-04-17 ASSESSMENT — ENCOUNTER SYMPTOMS
WHEEZING: 0
NAUSEA: 0
RHINORRHEA: 0
SINUS PRESSURE: 0
CONSTIPATION: 0
SORE THROAT: 0
TROUBLE SWALLOWING: 0
VOMITING: 0
SINUS PAIN: 0
COUGH: 0
SHORTNESS OF BREATH: 0
BLOOD IN STOOL: 0
DIARRHEA: 0
ABDOMINAL DISTENTION: 0
ABDOMINAL PAIN: 0
PHOTOPHOBIA: 0

## 2021-04-23 ENCOUNTER — TELEPHONE (OUTPATIENT)
Dept: FAMILY MEDICINE CLINIC | Age: 61
End: 2021-04-23

## 2021-04-23 NOTE — TELEPHONE ENCOUNTER
Spoke with Awanda Scheuermann at [employer]. Notified them that Froylan Love is a known diabetic on medications and is currently seeing Internal Medicine Manuel Rangel for his diabetes.

## 2021-04-23 NOTE — TELEPHONE ENCOUNTER
----- Message from Linwood Parker sent at 4/23/2021  1:45 PM EDT -----  Subject: Message to Provider    QUESTIONS  Information for Provider? Peter Rocha from the [address] [place] [address] called in to speak   with Dr. Kamari Christiansen in regards to the patients results. Patient has an   elevated blood sugar with fasting of 284 with no history of diabetes drawn   on 4/23/2021 at 1040 am.   ---------------------------------------------------------------------------  --------------  CALL BACK INFO  What is the best way for the office to contact you? OK to leave message on   voicemail  Preferred Call Back Phone Number? [phone]  ---------------------------------------------------------------------------  --------------  SCRIPT ANSWERS  Relationship to Patient? Third Party  Representative Name?  Juan Alberto Beltre [facility]

## 2021-06-17 RX ORDER — TROSPIUM CHLORIDE 20 MG/1
20 TABLET, FILM COATED ORAL 2 TIMES DAILY
Qty: 60 TABLET | Refills: 5 | Status: SHIPPED | OUTPATIENT
Start: 2021-06-17 | End: 2022-03-17

## 2021-06-29 ENCOUNTER — OFFICE VISIT (OUTPATIENT)
Dept: INTERNAL MEDICINE CLINIC | Age: 61
End: 2021-06-29
Payer: COMMERCIAL

## 2021-06-29 VITALS
TEMPERATURE: 97 F | WEIGHT: 301 LBS | SYSTOLIC BLOOD PRESSURE: 130 MMHG | BODY MASS INDEX: 37.42 KG/M2 | DIASTOLIC BLOOD PRESSURE: 80 MMHG | HEIGHT: 75 IN

## 2021-06-29 DIAGNOSIS — E11.9 TYPE 2 DIABETES MELLITUS WITHOUT COMPLICATION, UNSPECIFIED WHETHER LONG TERM INSULIN USE (HCC): Primary | ICD-10-CM

## 2021-06-29 DIAGNOSIS — E83.51 HYPOCALCEMIA: ICD-10-CM

## 2021-06-29 LAB — HBA1C MFR BLD: 9.1 % (ref 4.3–5.7)

## 2021-06-29 PROCEDURE — 99213 OFFICE O/P EST LOW 20 MIN: CPT | Performed by: NURSE PRACTITIONER

## 2021-06-29 PROCEDURE — G8427 DOCREV CUR MEDS BY ELIG CLIN: HCPCS | Performed by: NURSE PRACTITIONER

## 2021-06-29 PROCEDURE — 2022F DILAT RTA XM EVC RTNOPTHY: CPT | Performed by: NURSE PRACTITIONER

## 2021-06-29 PROCEDURE — 1036F TOBACCO NON-USER: CPT | Performed by: NURSE PRACTITIONER

## 2021-06-29 PROCEDURE — 3046F HEMOGLOBIN A1C LEVEL >9.0%: CPT | Performed by: NURSE PRACTITIONER

## 2021-06-29 PROCEDURE — 83036 HEMOGLOBIN GLYCOSYLATED A1C: CPT | Performed by: NURSE PRACTITIONER

## 2021-06-29 PROCEDURE — 3017F COLORECTAL CA SCREEN DOC REV: CPT | Performed by: NURSE PRACTITIONER

## 2021-06-29 PROCEDURE — G8417 CALC BMI ABV UP PARAM F/U: HCPCS | Performed by: NURSE PRACTITIONER

## 2021-06-29 RX ORDER — LISINOPRIL 10 MG/1
TABLET ORAL
Qty: 90 TABLET | Refills: 1 | Status: SHIPPED | OUTPATIENT
Start: 2021-06-29 | End: 2021-09-29 | Stop reason: SDUPTHER

## 2021-06-29 RX ORDER — GLIMEPIRIDE 4 MG/1
8 TABLET ORAL
Qty: 180 TABLET | Refills: 1 | Status: SHIPPED | OUTPATIENT
Start: 2021-06-29 | End: 2021-09-29 | Stop reason: SDUPTHER

## 2021-06-29 RX ORDER — PEN NEEDLE, DIABETIC 31 GX5/16"
NEEDLE, DISPOSABLE MISCELLANEOUS
Qty: 200 EACH | Refills: 3 | Status: SHIPPED | OUTPATIENT
Start: 2021-06-29 | End: 2022-09-12

## 2021-06-29 RX ORDER — INSULIN DEGLUDEC INJECTION 100 U/ML
40 INJECTION, SOLUTION SUBCUTANEOUS 2 TIMES DAILY
Qty: 60 ML | Refills: 1 | Status: SHIPPED | OUTPATIENT
Start: 2021-06-29 | End: 2021-09-20 | Stop reason: SDUPTHER

## 2021-06-29 SDOH — ECONOMIC STABILITY: FOOD INSECURITY: WITHIN THE PAST 12 MONTHS, THE FOOD YOU BOUGHT JUST DIDN'T LAST AND YOU DIDN'T HAVE MONEY TO GET MORE.: NEVER TRUE

## 2021-06-29 SDOH — ECONOMIC STABILITY: FOOD INSECURITY: WITHIN THE PAST 12 MONTHS, YOU WORRIED THAT YOUR FOOD WOULD RUN OUT BEFORE YOU GOT MONEY TO BUY MORE.: NEVER TRUE

## 2021-06-29 ASSESSMENT — SOCIAL DETERMINANTS OF HEALTH (SDOH): HOW HARD IS IT FOR YOU TO PAY FOR THE VERY BASICS LIKE FOOD, HOUSING, MEDICAL CARE, AND HEATING?: NOT HARD AT ALL

## 2021-06-29 ASSESSMENT — PATIENT HEALTH QUESTIONNAIRE - PHQ9
2. FEELING DOWN, DEPRESSED OR HOPELESS: 0
SUM OF ALL RESPONSES TO PHQ9 QUESTIONS 1 & 2: 0
SUM OF ALL RESPONSES TO PHQ QUESTIONS 1-9: 0
1. LITTLE INTEREST OR PLEASURE IN DOING THINGS: 0

## 2021-06-29 NOTE — PROGRESS NOTES
[facility] MANAGEMENT  Dinesh Georges  Dept: [phone]  Dept Fax: [phone]: [phone]     Visit Date:  2021    Patient:  Buster Chang  YOB: 1960    HPI:     Chief Complaint   Patient presents with    3 Month Follow-Up    Diabetes    Hypertension     Paramjit Kolb (:  1960) is a 57 y. o. male, here for evaluation of the following medical concerns: diabetes, hypocalcemia     I last seen Robbie 3 months ago. He follows routinely with Dr. Maxwell Lu ER visits or hospitalizations since last visit.      Since last visit he has had a heart catheterization with Dr. Rebeca Castillo, 50% coronary artery stenosis. No stents needed. Diabetes-  Diagnosed 7-8 years ago. He states his diabetes is not well controlled.  A1C 9.1% today, was 9.2% previously. Is on Ozempic 0.5 mg weekly, metformin 1000 mg BID, glimepiride 4 mg BID, sitagliptin 100 mg daily, and Tresiba 35 units BID. He did not tolerate Invokana. States that he misses oral medications occasionally. Is on lisinopril 10 mg daily, with last urinary microalbumin/crt ratio 8.5 in 2020. States hypoglycemic events not present. Is able to voice signs/symptoms of hypoglycemia as dizziness and diaphoresis.  Reports checking glucose 1 time per day, with range of glucose readings 123-315 per meter download. Attempting dietary modification for diabetes management and/or weight loss. Currently follows with diabetes clinic, dietician, CDE. Does report difficulty with obtaining medications. Last eye exam 2020. BP reccs <140/90 (<130/80 in CVD risk). On aspirin 81 mg daily and atorvastatin 40 mg daily.      Denies polyuria, polydipsia, polyphagia. Reports occasionally neuropathic symptoms including numbness, tingling. Does not have any wounds to feet.        Most recent labs in 2020, revealed a calcium of 8.0.  He denies any bone fragility. No fractures. Ionized calcium 1.23 in 12/2020. He is on calcium 600 mg daily.     Is following with urology for urinary issues and ED. Medications    Current Outpatient Medications:     SITagliptin (JANUVIA) 100 MG tablet, TAKE 1 TABLET BY MOUTH EVERY DAY, Disp: 90 tablet, Rfl: 1    Insulin Degludec (TRESIBA FLEXTOUCH) 100 UNIT/ML SOPN, Inject 40 Units into the skin 2 times daily, Disp: 60 mL, Rfl: 1    Insulin Pen Needle (B-D UF III MINI PEN NEEDLES) 31G X 5 MM MISC, BID Dx: E11.9, Disp: 200 each, Rfl: 3    glimepiride (AMARYL) 4 MG tablet, Take 2 tablets by mouth every morning (before breakfast), Disp: 180 tablet, Rfl: 1    lisinopril (PRINIVIL;ZESTRIL) 10 MG tablet, 1 tablet daily, Disp: 90 tablet, Rfl: 1    trospium (SANCTURA) 20 MG tablet, Take 1 tablet by mouth 2 times daily, Disp: 60 tablet, Rfl: 5    metFORMIN (GLUCOPHAGE) 1000 MG tablet, Take 1 tablet by mouth 2 times daily (with meals), Disp: 180 tablet, Rfl: 1    blood glucose test strips (ONETOUCH ULTRA) strip, TEST TWICE A DAY, Disp: 200 strip, Rfl: 3    atorvastatin (LIPITOR) 40 MG tablet, TAKE 1 TABLET BY MOUTH EVERY DAY, Disp: 90 tablet, Rfl: 3    Semaglutide,0.25 or 0.5MG/DOS, (OZEMPIC, 0.25 OR 0.5 MG/DOSE,) 2 MG/1.5ML SOPN, Inject 0.5 mg into the skin once a week, Disp: 12 pen, Rfl: 2    nystatin-triamcinolone (MYCOLOG II) 441908-8.1 UNIT/GM-% cream, Apply topically 2 times daily. , Disp: 60 g, Rfl: 5    SOFT TOUCH LANCETS MISC, 1 Cartridge by Does not apply route 2 times daily, Disp: 100 each, Rfl: 3    aspirin EC 81 MG EC tablet, Take 1 tablet by mouth daily. , Disp: 30 tablet, Rfl: 3    Acai 25 MG CAPS, Take  by mouth daily. , Disp: , Rfl:     Krill Oil CAPS, Take 1 tablet by mouth daily. , Disp: , Rfl:     Calcium Carb-Cholecalciferol 600-1000 MG-UNIT CAPS, Take 1 tablet by mouth daily. , Disp: , Rfl:     therapeutic multivitamin-minerals (THERAGRAN-M) tablet, Take 1 tablet by mouth daily.   , Disp: , Rfl:     The patient is allergic to zithromax [azithromycin dihydrate]. Past Medical History  Drake Lujan  has a past medical history of Diabetes mellitus ([address] Diabetes mellitus out of control ([address] Glucose intolerance (impaired glucose tolerance), HTN (hypertension), and Hyperlipidemia. Past Surgical History  The patient  has a past surgical history that includes lipoma resection (2007); Appendectomy (2006); Knee arthroscopy (12/7/11); Tonsillectomy; Colonoscopy (04/28/2017); colectomy (05/23/2017); and Colonoscopy (11/2017). Family History  This patient's family history includes Cancer in his mother; Diabetes in his mother; Heart Disease in his father; High Blood Pressure in his mother; Stroke in his father. Social History  Drake Lujan  reports that he quit smoking about 16 years ago. His smoking use included cigarettes. He started smoking about 39 years ago. He has a 6.00 pack-year smoking history. He has never used smokeless tobacco. He reports current alcohol use. He reports that he does not use drugs.     Health Maintenance:    Health Maintenance   Topic Date Due    Pneumococcal 0-64 years Vaccine (1 of 2 - PPSV23) Never done    COVID-19 Vaccine (1) Never done    HIV screen  Never done    DTaP/Tdap/Td vaccine (1 - Tdap) Never done    Shingles Vaccine (1 of 2) Never done    Diabetic foot exam  12/06/2019    Diabetic retinal exam  12/06/2020    Flu vaccine (1) 09/01/2021    A1C test (Diabetic or Prediabetic)  09/29/2021    Diabetic microalbuminuria test  12/29/2021    Lipid screen  04/17/2022    Potassium monitoring  04/17/2022    Creatinine monitoring  04/17/2022    Colon cancer screen colonoscopy  01/06/2030    Hepatitis C screen  Completed    Hepatitis A vaccine  Aged Out    Hib vaccine  Aged Out    Meningococcal (ACWY) vaccine  Aged Out       Subjective:      Review of Systems    Objective:     /80 (Site: Right Upper Arm, Cuff Size: Large Adult)   Temp 97 °F (36.1 °C) (Temporal)   Ht 6' 3\"

## 2021-07-21 ENCOUNTER — OFFICE VISIT (OUTPATIENT)
Dept: UROLOGY | Age: 61
End: 2021-07-21
Payer: COMMERCIAL

## 2021-07-21 VITALS — HEIGHT: 75 IN | WEIGHT: 296 LBS | BODY MASS INDEX: 36.8 KG/M2

## 2021-07-21 DIAGNOSIS — N50.811 RIGHT TESTICULAR PAIN: ICD-10-CM

## 2021-07-21 DIAGNOSIS — N52.9 ERECTILE DYSFUNCTION, UNSPECIFIED ERECTILE DYSFUNCTION TYPE: ICD-10-CM

## 2021-07-21 DIAGNOSIS — R35.0 URINARY FREQUENCY: Primary | ICD-10-CM

## 2021-07-21 LAB
BILIRUBIN URINE: NEGATIVE
BLOOD URINE, POC: NEGATIVE
CHARACTER, URINE: CLEAR
COLOR, URINE: YELLOW
GLUCOSE URINE: NEGATIVE MG/DL
KETONES, URINE: NEGATIVE
LEUKOCYTE CLUMPS, URINE: NEGATIVE
NITRITE, URINE: NEGATIVE
PH, URINE: 5 (ref 5–9)
PROTEIN, URINE: NEGATIVE MG/DL
SPECIFIC GRAVITY, URINE: >= 1.03 (ref 1–1.03)
UROBILINOGEN, URINE: 0.2 EU/DL (ref 0–1)

## 2021-07-21 PROCEDURE — G8417 CALC BMI ABV UP PARAM F/U: HCPCS | Performed by: UROLOGY

## 2021-07-21 PROCEDURE — 1036F TOBACCO NON-USER: CPT | Performed by: UROLOGY

## 2021-07-21 PROCEDURE — G8428 CUR MEDS NOT DOCUMENT: HCPCS | Performed by: UROLOGY

## 2021-07-21 PROCEDURE — 81003 URINALYSIS AUTO W/O SCOPE: CPT | Performed by: UROLOGY

## 2021-07-21 PROCEDURE — 99214 OFFICE O/P EST MOD 30 MIN: CPT | Performed by: UROLOGY

## 2021-07-21 PROCEDURE — 3017F COLORECTAL CA SCREEN DOC REV: CPT | Performed by: UROLOGY

## 2021-07-21 NOTE — PROGRESS NOTES
Urine Negative NEGATIVE mg/dl    Urobilinogen, Urine 0.20 0.0 - 1.0 eu/dl    Nitrite, Urine Negative NEGATIVE    Leukocyte Clumps, Urine Negative NEGATIVE    Color, Urine Yellow YELLOW-STRAW    Character, Urine Clear [id number].CLOUD       Last BUN and creatinine:  Lab Results   Component Value Date    BUN 17 04/17/2021     Lab Results   Component Value Date    CREATININE 0.96 04/17/2021         Imaging Reviewed during this Office Visit:   Denis Wiggins MD independently reviewed the images and verified the radiology reports from:    No results found.     PAST MEDICAL, FAMILY AND SOCIAL HISTORY:  Past Medical History:   Diagnosis Date    Diabetes mellitus [facility]     Diabetes mellitus out of control ([facility]     Glucose intolerance (impaired glucose tolerance)     HTN (hypertension)     Hyperlipidemia      Past Surgical History:   Procedure Laterality Date    APPENDECTOMY  2006    COLECTOMY  05/23/2017    Robotic sigmoid     COLONOSCOPY  04/28/2017    [place] COLONOSCOPY  11/2017    Dr. Alix Urias 1 yr follow-up     KNEE ARTHROSCOPY  12/7/11       Right lateral meniscectomy    LIPOMA RESECTION  2007    forehead 2007 Dr Jairo Tubbs      as a child     Family History   Problem Relation Age of Onset    Diabetes Mother     High Blood Pressure Mother     Cancer Mother         breast, skin    Heart Disease Father     Stroke Father      Outpatient Medications Marked as Taking for the 7/21/21 encounter (Office Visit) with Marie Navarrete MD   Medication Sig Dispense Refill    SITagliptin (JANUVIA) 100 MG tablet TAKE 1 TABLET BY MOUTH EVERY DAY 90 tablet 1    Insulin Degludec (TRESIBA FLEXTOUCH) 100 UNIT/ML SOPN Inject 40 Units into the skin 2 times daily 60 mL 1    Insulin Pen Needle (B-D UF III MINI PEN NEEDLES) 31G X 5 MM MISC BID Dx: E11.9 200 each 3    glimepiride (AMARYL) 4 MG tablet Take 2 tablets by mouth every morning (before breakfast) 180 tablet 1    lisinopril (PRINIVIL;ZESTRIL) 10 MG tablet 1 tablet daily 90 tablet 1    trospium (SANCTURA) 20 MG tablet Take 1 tablet by mouth 2 times daily 60 tablet 5    metFORMIN (GLUCOPHAGE) 1000 MG tablet Take 1 tablet by mouth 2 times daily (with meals) 180 tablet 1    blood glucose test strips (ONETOUCH ULTRA) strip TEST TWICE A  strip 3    atorvastatin (LIPITOR) 40 MG tablet TAKE 1 TABLET BY MOUTH EVERY DAY 90 tablet 3    Semaglutide,0.25 or 0.5MG/DOS, (OZEMPIC, 0.25 OR 0.5 MG/DOSE,) 2 MG/1.5ML SOPN Inject 0.5 mg into the skin once a week 12 pen 2    nystatin-triamcinolone (MYCOLOG II) 419377-5.1 UNIT/GM-% cream Apply topically 2 times daily. 60 g 5    SOFT TOUCH LANCETS MISC 1 Cartridge by Does not apply route 2 times daily 100 each 3    aspirin EC 81 MG EC tablet Take 1 tablet by mouth daily. 30 tablet 3    Acai 25 MG CAPS Take  by mouth daily.  Krill Oil CAPS Take 1 tablet by mouth daily.  Calcium Carb-Cholecalciferol 600-1000 MG-UNIT CAPS Take 1 tablet by mouth daily.  therapeutic multivitamin-minerals (THERAGRAN-M) tablet Take 1 tablet by mouth daily. Zithromax [azithromycin dihydrate]  Social History     Tobacco Use   Smoking Status Former Smoker    Packs/day: 0.50    Years: 12.00    Pack years: 6.00    Types: Cigarettes    Start date: 1982   Dossie Jackelin Quit date: 2005    Years since quittin.5   Smokeless Tobacco Never Used      (If patient a smoker, smoking cessation counseling offered)   Social History     Substance and Sexual Activity   Alcohol Use Yes    Comment: RARELY       REVIEW OF SYSTEMS:  Constitutional: negative  Eyes: negative  Respiratory: negative  Cardiovascular: negative  Gastrointestinal: negative  Genitourinary: see HPI  Musculoskeletal: negative  Skin: negative   Neurological: negative  Hematological/Lymphatic: negative  Psychological: negative        Physical Exam:    This a 62 y.o. male  There were no vitals filed for this visit. Body mass index is 37 kg/m².   Constitutional:

## 2021-08-29 ENCOUNTER — HOSPITAL ENCOUNTER (EMERGENCY)
Age: 61
Discharge: HOME | End: 2021-08-29
Payer: COMMERCIAL

## 2021-08-29 VITALS
DIASTOLIC BLOOD PRESSURE: 80 MMHG | WEIGHT: 291.6 LBS | SYSTOLIC BLOOD PRESSURE: 147 MMHG | OXYGEN SATURATION: 99 % | RESPIRATION RATE: 14 BRPM | HEIGHT: 74 IN | TEMPERATURE: 99 F | BODY MASS INDEX: 37.42 KG/M2 | HEART RATE: 86 BPM

## 2021-08-29 DIAGNOSIS — U07.1 COVID-19: Primary | ICD-10-CM

## 2021-08-29 LAB — SARS-COV-2, NAA: DETECTED

## 2021-08-29 PROCEDURE — 87635 SARS-COV-2 COVID-19 AMP PRB: CPT

## 2021-08-29 PROCEDURE — 99202 OFFICE O/P NEW SF 15 MIN: CPT

## 2021-08-29 PROCEDURE — 99213 OFFICE O/P EST LOW 20 MIN: CPT | Performed by: NURSE PRACTITIONER

## 2021-08-29 RX ORDER — DEXAMETHASONE 6 MG/1
6 TABLET ORAL 2 TIMES DAILY WITH MEALS
Qty: 14 TABLET | Refills: 0 | Status: SHIPPED | OUTPATIENT
Start: 2021-08-29 | End: 2021-09-05

## 2021-08-29 RX ORDER — ONDANSETRON 4 MG/1
4 TABLET, ORALLY DISINTEGRATING ORAL EVERY 8 HOURS PRN
Qty: 20 TABLET | Refills: 0 | Status: SHIPPED | OUTPATIENT
Start: 2021-08-29 | End: 2022-03-17 | Stop reason: ALTCHOICE

## 2021-08-29 ASSESSMENT — ENCOUNTER SYMPTOMS
COUGH: 1
WHEEZING: 0
VISUAL CHANGE: 0
CHEST TIGHTNESS: 0
ABDOMINAL PAIN: 0
SINUS PRESSURE: 1
STRIDOR: 0
VOMITING: 0
APNEA: 0
RHINORRHEA: 1
DIARRHEA: 0
HEMATOCHEZIA: 0
NAUSEA: 1
CHOKING: 0
SHORTNESS OF BREATH: 0

## 2021-08-29 ASSESSMENT — PAIN DESCRIPTION - PAIN TYPE: TYPE: ACUTE PAIN

## 2021-08-29 ASSESSMENT — PAIN DESCRIPTION - LOCATION: LOCATION: GENERALIZED

## 2021-08-29 ASSESSMENT — PAIN DESCRIPTION - FREQUENCY: FREQUENCY: CONTINUOUS

## 2021-08-29 ASSESSMENT — PAIN DESCRIPTION - DESCRIPTORS: DESCRIPTORS: ACHING

## 2021-08-29 ASSESSMENT — PAIN SCALES - GENERAL: PAINLEVEL_OUTOF10: 6

## 2021-08-29 NOTE — ED TRIAGE NOTES
Pt ambulatory into [facility] with c/o generalized body aches, headache, cough, fever and loss of appetite for the past week. Pt states pain 6.

## 2021-08-29 NOTE — LETTER
[address] [facility] Internal Medicine  [address]  [facility], [address]  Phone: [phone]  Fax: [phone]    August 31, 2021    [address] [address]    Dear Addison Barroso,    This letter is regarding your Emergency Department (ED) visit at [facility] on 8/29/21. Dr. Katie Spear wanted to make sure that you understand your discharge instructions and that you were able to fill any prescriptions that may have been ordered for you. Please contact the office at the above phone number if the ED advised you to follow up with Cyndie Santana, or if you have any further questions or needs. Also did you know -                             *You can call your doctor even after hours so they can direct you to the most appropriate care. [facility]) practices can often offer you an appointment on the same day that you call. *We have some [facility] offices that offer Walk-in appointments; check our website for availability in your community, [email or web address].      *Evisits are now available for patients for $36 through [facility] for certain conditions:  * Sinus, cold and or cough       * Diarrhea            * Headache  * Heartburn                                * Poison Judith          * Back pain     * Urinary problems                         If you do not have [employer] and are interested, please contact the office and a staff member may assist you or go to [email or web address].     Sincerely,     LIZZ Patino CNP and your [facility]

## 2021-08-29 NOTE — ED PROVIDER NOTES
[facility]  Urgent Care Encounter      CHIEF COMPLAINT       Chief Complaint   Patient presents with    Cough    Fever    Headache    Generalized Body Aches       Nurses Notes reviewed and I agree except as noted in the HPI. HISTORY OFPRESENT ILLNESS   Adam Jimenez is a 62 y.o. The history is provided by the patient and the spouse. No  was used. Fatigue  Severity:  Severe  Onset quality:  Gradual  Duration:  1 week  Timing:  Constant  Progression:  Waxing and waning  Chronicity:  New  Context: dehydration and recent infection    Context: not alcohol use, not allergies, not change in medication, not decreased sleep, not drug use, not increased activity, not pinched nerve, not stress and not urinary tract infection    Relieved by:  Nothing  Worsened by:  Nothing  Ineffective treatments:  Medication, rest, lying down and drinking fluids  Associated symptoms: anorexia, chest pain, cough, headaches and nausea    Associated symptoms: no abdominal pain, no aphasia, no arthralgias, no ataxia, no diarrhea, no difficulty walking, no dizziness, no drooling, no dysphagia, no dysuria, no numbness in extremities, no falls, no fever, no foul-smelling urine, no frequency, no hematochezia, no lethargy, no loss of consciousness, no melena, no myalgias, no near-syncope, no seizures, no sensory-motor deficit, no shortness of breath, no stroke symptoms, no syncope, no urgency, no vision change and no vomiting        REVIEW OF SYSTEMS     Review of Systems   Constitutional: Positive for activity change, appetite change and fatigue. Negative for chills, diaphoresis and fever. HENT: Positive for congestion, postnasal drip, rhinorrhea and sinus pressure. Negative for drooling. Respiratory: Positive for cough. Negative for apnea, choking, chest tightness, shortness of breath, wheezing and stridor. Cardiovascular: Positive for chest pain.  Negative for palpitations, leg swelling, syncope and near-syncope. Gastrointestinal: Positive for anorexia and nausea. Negative for abdominal pain, diarrhea, dysphagia, hematochezia, melena and vomiting. Genitourinary: Negative for dysuria, frequency and urgency. Musculoskeletal: Negative for arthralgias, falls and myalgias. Neurological: Positive for headaches. Negative for dizziness, seizures and loss of consciousness. PAST MEDICAL HISTORY         Diagnosis Date    Diabetes mellitus [facility] [facility]     Diabetes mellitus out of control ([facility]     Glucose intolerance (impaired glucose tolerance)     HTN (hypertension)     Hyperlipidemia        SURGICAL HISTORY     Patient  has a past surgical history that includes lipoma resection (2007); Appendectomy (2006); Knee arthroscopy (12/7/11); Tonsillectomy; Colonoscopy (04/28/2017); colectomy (05/23/2017); and Colonoscopy (11/2017). CURRENT MEDICATIONS       Previous Medications    ACAI 25 MG CAPS    Take  by mouth daily. ASPIRIN EC 81 MG EC TABLET    Take 1 tablet by mouth daily. ATORVASTATIN (LIPITOR) 40 MG TABLET    TAKE 1 TABLET BY MOUTH EVERY DAY    BLOOD GLUCOSE TEST STRIPS (ONETOUCH ULTRA) STRIP    TEST TWICE A DAY    CALCIUM CARB-CHOLECALCIFEROL 600-1000 MG-UNIT CAPS    Take 1 tablet by mouth daily. GLIMEPIRIDE (AMARYL) 4 MG TABLET    Take 2 tablets by mouth every morning (before breakfast)    INSULIN DEGLUDEC (TRESIBA FLEXTOUCH) 100 UNIT/ML SOPN    Inject 40 Units into the skin 2 times daily    INSULIN PEN NEEDLE (B-D UF III MINI PEN NEEDLES) 31G X 5 MM MISC    BID Dx: E11.9    KRILL OIL CAPS    Take 1 tablet by mouth daily. LISINOPRIL (PRINIVIL;ZESTRIL) 10 MG TABLET    1 tablet daily    METFORMIN (GLUCOPHAGE) 1000 MG TABLET    Take 1 tablet by mouth 2 times daily (with meals)    NYSTATIN-TRIAMCINOLONE (MYCOLOG II) 225514-0.1 UNIT/GM-% CREAM    Apply topically 2 times daily.     PSEUDOEPHEDRINE-APAP-DM (DAYQUIL PO)    Take by mouth    SEMAGLUTIDE,0.25 OR 0.5MG/DOS, (OZEMPIC, 0. 25 OR 0.5 MG/DOSE,) 2 MG/1.5ML SOPN    Inject 0.5 mg into the skin once a week    SITAGLIPTIN (JANUVIA) 100 MG TABLET    TAKE 1 TABLET BY MOUTH EVERY DAY    SOFT TOUCH LANCETS MISC    1 Cartridge by Does not apply route 2 times daily    THERAPEUTIC MULTIVITAMIN-MINERALS (THERAGRAN-M) TABLET    Take 1 tablet by mouth daily. TROSPIUM (SANCTURA) 20 MG TABLET    Take 1 tablet by mouth 2 times daily       ALLERGIES     Patient is is allergic to zithromax [azithromycin dihydrate]. FAMILY HISTORY     Patient's family history includes Cancer in his mother; Diabetes in his mother; Heart Disease in his father; High Blood Pressure in his mother; Stroke in his father. SOCIAL HISTORY     Patient  reports that he quit smoking about 16 years ago. His smoking use included cigarettes. He started smoking about 39 years ago. He has a 6.00 pack-year smoking history. He has never used smokeless tobacco. He reports current alcohol use. He reports that he does not use drugs. PHYSICAL EXAM     ED TRIAGE VITALS  BP: (!) 147/80, Temp: 99 °F (37.2 °C), Pulse: 86, Resp: 14, SpO2: 99 %  Physical Exam  Vitals and nursing note reviewed. Constitutional:       General: He is awake. He is not in acute distress. Appearance: Normal appearance. He is well-groomed. He is obese. He is ill-appearing. He is not toxic-appearing or diaphoretic. HENT:      Head: Normocephalic and atraumatic. Right Ear: Hearing, tympanic membrane, ear canal and external ear normal.      Left Ear: Hearing, tympanic membrane, ear canal and external ear normal.   Eyes:      Extraocular Movements: Extraocular movements intact. Conjunctiva/sclera: Conjunctivae normal.   Cardiovascular:      Rate and Rhythm: Normal rate and regular rhythm. Heart sounds: Normal heart sounds. No murmur heard. No friction rub. No gallop. Pulmonary:      Effort: Pulmonary effort is normal. No respiratory distress. Breath sounds: Normal breath sounds.  No stridor. No wheezing, rhonchi or rales. Chest:      Chest wall: No tenderness. Musculoskeletal:         General: Normal range of motion. Cervical back: Normal range of motion. Skin:     General: Skin is warm. Neurological:      General: No focal deficit present. Mental Status: He is alert and oriented to person, place, and time. Psychiatric:         Mood and Affect: Mood normal.         Behavior: Behavior normal. Behavior is cooperative. Thought Content: Thought content normal.         Judgment: Judgment normal.         DIAGNOSTIC RESULTS   Labs:  Results for orders placed or performed during the hospital encounter of 08/29/21   COVID-19, Rapid   Result Value Ref Range    SARS-CoV-2, SLADE DETECTED (AA) NOT DETECTED       IMAGING:  No orders to display     URGENT CARE COURSE:     Vitals:    08/29/21 1025   BP: (!) 147/80   Pulse: 86   Resp: 14   Temp: 99 °F (37.2 °C)   TempSrc: Oral   SpO2: 99%   Weight: 291 lb 9.6 oz (132.3 kg)   Height: 6' 2\" (1.88 m)       Medications - No data to display  PROCEDURES:  None  FINAL IMPRESSION      1. COVID-19        DISPOSITION/PLAN   Decision To Discharge    You are COVID-19 positive. Continue to quarantine for 10 days after start of symptoms. If applicable, work/school note has been provided for the first 2 days. You will need to follow with your family provider for further work/school note needs to cover the rest of the time needed off. When to seek immediate emergency medical attention:    Uncontrollable fever  Trouble breathing  Persistent pain or pressure in the chest  New confusion  Inability to wake or stay awake  Pale, gray, or blue-colored skin, lips, or nail beds, depending on skin tone  *This list is not all possible symptoms. Please call your medical provider for any other symptoms that are severe or concerning to you.     May take over-the-counter supplements daily if no contraindications:  Multi-vitamin/multi-mineral daily Vitamin D3 4000 IU daily  Zinc 75 mg daily  Vitamin C 1000 mg twice daily  B-100 complex as daily as directed on bottle  Melatonin 10 mg before bedtime, caution causes drowsiness, do not drive or operate heavy machinery  Gargle with mouthwash 3 times daily, may use Scope, Crest, or Listerine.           PATIENT REFERRED TO:  LIZZ Carmichael CNP  [address] [facility] [address]  [phone]    Schedule an appointment as soon as possible for a visit in 1 week  For re-check    [address] [phone]  Go today  If symptoms worsen    DISCHARGE MEDICATIONS:  New Prescriptions    DEXAMETHASONE (DECADRON) 6 MG TABLET    Take 1 tablet by mouth 2 times daily (with meals) for 7 days    ONDANSETRON (ZOFRAN ODT) 4 MG DISINTEGRATING TABLET    Take 1 tablet by mouth every 8 hours as needed for Nausea or Vomiting     Current Discharge Medication List          LIZZ Gongora CNP, APRN - CNP  08/29/21 8997

## 2021-08-29 NOTE — ED NOTES
Pt verbalized discharge instructions. Pt informed to go to ER if develop chest pain, shortness of breath or abdominal pain. Pt ambulatory out in stable condition. Assessment unchanged.        Sirisha Akins RN  08/29/21 3641

## 2021-08-30 ENCOUNTER — CARE COORDINATION (OUTPATIENT)
Dept: CARE COORDINATION | Age: 61
End: 2021-08-30

## 2021-08-31 NOTE — CARE COORDINATION
Encouraged follow up with PCP, declines at this time but educated to use them for sx management as needed    Patient contacted regarding COVID-19 diagnosis. Discussed COVID-19 related testing which was available at this time. Test results were positive. Patient informed of results, if available? Yes. Ambulatory Care Manager contacted the patient by telephone to perform post discharge assessment. Call within 2 business days of discharge: Yes. Verified name and  with patient as identifiers. Provided introduction to self, and explanation of the CTN/ACM role, and reason for call due to risk factors for infection and/or exposure to COVID-19. Symptoms reviewed with patient who verbalized the following symptoms: no new symptoms, no worsening symptoms and states feeling better, fever broke. Due to no new or worsening symptoms encounter was not routed to provider for escalation. Discussed follow-up appointments. If no appointment was previously scheduled, appointment scheduling offered: encouraged follow up appt with PCP within 7 days. [address] follow up appointment(s):   Future Appointments   Date Time Provider Carrillo Rosado   2021  8:00 AM [facility]   2022  8:30 AM Alma Colunga  [facility]     [id number] follow up appointment(s):     Non-face-to-face services provided:  Obtained and reviewed discharge summary and/or continuity of care documents     Advance Care Planning:   Does patient have an Advance Directive:  reviewed and current. Educated patient about risk for severe COVID-19 due to risk factors according to CDC guidelines. ACM reviewed discharge instructions, medical action plan and red flag symptoms with the patient who verbalized understanding. Discussed COVID vaccination status: unable to discuss during call. Education provided on COVID-19 vaccination as appropriate.  Discussed exposure protocols and quarantine with CDC Guidelines. Patient was given an opportunity to verbalize any questions and concerns and agrees to contact ACM or health care provider for questions related to their healthcare. Reviewed and educated patient on any new and changed medications related to discharge diagnosis     Was patient discharged with a pulse oximeter? No Discussed and confirmed pulse oximeter discharge instructions and when to notify provider or seek emergency care. ACM provided contact information. Plan for follow-up call in 5-7 days based on severity of symptoms and risk factors.

## 2021-09-03 ENCOUNTER — TELEPHONE (OUTPATIENT)
Dept: INTERNAL MEDICINE CLINIC | Age: 61
End: 2021-09-03

## 2021-09-03 NOTE — TELEPHONE ENCOUNTER
Pt is ok with taking the tesslon for his cough.  He would like it to go to [facility] on [address] in [place]

## 2021-09-03 NOTE — TELEPHONE ENCOUNTER
----- Message from Alec Madera sent at 9/3/2021  9:13 AM EDT -----  Subject: Message to Provider    QUESTIONS  Information for Provider? Patient went to Urgent care and was diagnosed   with Covid-19 by LINDA Núñez. Patient has quarantined and taken   medicine, but patient would like to be checked out and possibly get a   prescription for Hydroxychloroquine to help get rid of cough.   ---------------------------------------------------------------------------  --------------  CALL BACK INFO  What is the best way for the office to contact you? OK to leave message on   voicemail  Preferred Call Back Phone Number? [phone]  ---------------------------------------------------------------------------  --------------  SCRIPT ANSWERS  Relationship to Patient?  Self

## 2021-09-03 NOTE — TELEPHONE ENCOUNTER
Hydroxychloroquine is not approved for treatment of cough, I am not sure where he heard this. It is not recommended for use in treatment of COVID outside of hospital setting, nor have studies proven it to be effective. Breathing treatments, inhalers, zinc, vitamin d, and vitamin c are the best for treating home symptoms. Increase water intake. We can send some tessalon as well if he would like.

## 2021-09-06 RX ORDER — BENZONATATE 100 MG/1
100-200 CAPSULE ORAL 2 TIMES DAILY PRN
Qty: 30 CAPSULE | Refills: 0 | Status: SHIPPED | OUTPATIENT
Start: 2021-09-06 | End: 2021-09-14

## 2021-09-07 ENCOUNTER — CARE COORDINATION (OUTPATIENT)
Dept: CARE COORDINATION | Age: 61
End: 2021-09-07

## 2021-09-07 NOTE — CARE COORDINATION
Unable to reach and unable to leave voicemail to complete subsequent followup call from UC/COVID call.

## 2021-09-18 DIAGNOSIS — E11.9 TYPE 2 DIABETES MELLITUS WITHOUT COMPLICATION, UNSPECIFIED WHETHER LONG TERM INSULIN USE (HCC): ICD-10-CM

## 2021-09-20 RX ORDER — INSULIN DEGLUDEC INJECTION 100 U/ML
INJECTION, SOLUTION SUBCUTANEOUS
Qty: 60 ML | Refills: 2 | Status: SHIPPED | OUTPATIENT
Start: 2021-09-20 | End: 2022-03-29 | Stop reason: SDUPTHER

## 2021-09-29 ENCOUNTER — OFFICE VISIT (OUTPATIENT)
Dept: INTERNAL MEDICINE CLINIC | Age: 61
End: 2021-09-29
Payer: COMMERCIAL

## 2021-09-29 VITALS
TEMPERATURE: 97.3 F | DIASTOLIC BLOOD PRESSURE: 76 MMHG | HEIGHT: 74 IN | BODY MASS INDEX: 38.63 KG/M2 | SYSTOLIC BLOOD PRESSURE: 148 MMHG | WEIGHT: 301 LBS

## 2021-09-29 DIAGNOSIS — E11.9 TYPE 2 DIABETES MELLITUS WITHOUT COMPLICATION, UNSPECIFIED WHETHER LONG TERM INSULIN USE (HCC): Primary | ICD-10-CM

## 2021-09-29 DIAGNOSIS — E83.51 HYPOCALCEMIA: ICD-10-CM

## 2021-09-29 PROCEDURE — 99214 OFFICE O/P EST MOD 30 MIN: CPT | Performed by: NURSE PRACTITIONER

## 2021-09-29 PROCEDURE — 1036F TOBACCO NON-USER: CPT | Performed by: NURSE PRACTITIONER

## 2021-09-29 PROCEDURE — G8427 DOCREV CUR MEDS BY ELIG CLIN: HCPCS | Performed by: NURSE PRACTITIONER

## 2021-09-29 PROCEDURE — G8417 CALC BMI ABV UP PARAM F/U: HCPCS | Performed by: NURSE PRACTITIONER

## 2021-09-29 PROCEDURE — 3017F COLORECTAL CA SCREEN DOC REV: CPT | Performed by: NURSE PRACTITIONER

## 2021-09-29 PROCEDURE — 3046F HEMOGLOBIN A1C LEVEL >9.0%: CPT | Performed by: NURSE PRACTITIONER

## 2021-09-29 PROCEDURE — 2022F DILAT RTA XM EVC RTNOPTHY: CPT | Performed by: NURSE PRACTITIONER

## 2021-09-29 PROCEDURE — 83036 HEMOGLOBIN GLYCOSYLATED A1C: CPT | Performed by: NURSE PRACTITIONER

## 2021-09-29 RX ORDER — LISINOPRIL 10 MG/1
TABLET ORAL
Qty: 90 TABLET | Refills: 1 | Status: SHIPPED | OUTPATIENT
Start: 2021-09-29 | End: 2022-10-05 | Stop reason: SINTOL

## 2021-09-29 RX ORDER — ATORVASTATIN CALCIUM 40 MG/1
40 TABLET, FILM COATED ORAL DAILY
Qty: 90 TABLET | Refills: 1 | Status: SHIPPED | OUTPATIENT
Start: 2021-09-29 | End: 2022-06-30 | Stop reason: SDUPTHER

## 2021-09-29 RX ORDER — GLIMEPIRIDE 4 MG/1
8 TABLET ORAL
Qty: 180 TABLET | Refills: 1 | Status: SHIPPED | OUTPATIENT
Start: 2021-09-29 | End: 2022-08-09 | Stop reason: SDUPTHER

## 2021-09-29 RX ORDER — SEMAGLUTIDE 1.34 MG/ML
0.5 INJECTION, SOLUTION SUBCUTANEOUS WEEKLY
Qty: 12 PEN | Refills: 1 | Status: SHIPPED | OUTPATIENT
Start: 2021-09-29 | End: 2022-03-17

## 2021-09-29 NOTE — PROGRESS NOTES
[facility]  Dinesh Georges  Dept: [phone]  Dept Fax: [phone]: [phone]     Visit Date:  2021    Patient:  Romeo Fountain  YOB: 1960    HPI:     Chief Complaint   Patient presents with    3 Month Follow-Up    Diabetes     Maritza Kolb (:  1960) is a 57 y. o. male, here for evaluation of the following medical concerns: diabetes, hypocalcemia     I last seen Robbie 3 months ago. He follows routinely with Dr. Anali Ervin ER visits or hospitalizations since last visit.      Heart catheterization with Dr. Ursula Easley within 1 year, 50% coronary artery stenosis. No stents needed.      Diabetes-  Diagnosed 7-8 years ago. He states his diabetes is not well controlled.  A1C 8.6% today, was 9.1% previously. Is on Ozempic 0.5 mg weekly, metformin 1000 mg BID, glimepiride 4 mg BID, sitagliptin 100 mg daily, and Tresiba 40 units BID. He did not tolerate Invokana. States that he misses oral medications occasionally. Is on lisinopril 10 mg daily, with last urinary microalbumin/crt ratio 8.5 in 2020. States hypoglycemic events not present. Is able to voice signs/symptoms of hypoglycemia as dizziness and diaphoresis.  Reports checking glucose 1 time per day, with range of glucose readings 130-200s per patient statement. Attempting dietary modification for diabetes management and/or weight loss. Currently follows with diabetes clinic, dietician, CDE. Does report difficulty with obtaining medications. Last eye exam 2020. BP reccs <140/90 (<130/80 in CVD risk). On aspirin 81 mg daily and atorvastatin 40 mg daily.      Denies polyuria, polydipsia, polyphagia. Reports occasionally neuropathic symptoms including numbness, tingling. Does not have any wounds to feet.        Most recent labs in 2021, revealed a calcium of 9.5. He denies any bone fragility.  No fractures. Ionized calcium 1.23 in 12/2020. He is on calcium 600 mg daily.     Is following with urology for urinary issues and ED.       Medications    Current Outpatient Medications:     Semaglutide,0.25 or 0.5MG/DOS, (OZEMPIC, 0.25 OR 0.5 MG/DOSE,) 2 MG/1.5ML SOPN, Inject 0.5 mg into the skin once a week, Disp: 12 pen, Rfl: 1    SITagliptin (JANUVIA) 100 MG tablet, TAKE 1 TABLET BY MOUTH EVERY DAY, Disp: 90 tablet, Rfl: 1    glimepiride (AMARYL) 4 MG tablet, Take 2 tablets by mouth every morning (before breakfast), Disp: 180 tablet, Rfl: 1    metFORMIN (GLUCOPHAGE) 1000 MG tablet, Take 1 tablet by mouth 2 times daily (with meals), Disp: 180 tablet, Rfl: 1    lisinopril (PRINIVIL;ZESTRIL) 10 MG tablet, 1 tablet daily, Disp: 90 tablet, Rfl: 1    atorvastatin (LIPITOR) 40 MG tablet, Take 1 tablet by mouth daily, Disp: 90 tablet, Rfl: 1    TRESIBA FLEXTOUCH 100 UNIT/ML SOPN, INJECT 30 UNITS INTO THE SKIN 2 TIMES DAILY (Patient taking differently: 40 Units 2 times daily ), Disp: 60 mL, Rfl: 2    Pseudoephedrine-APAP-DM (DAYQUIL PO), Take by mouth, Disp: , Rfl:     ondansetron (ZOFRAN ODT) 4 MG disintegrating tablet, Take 1 tablet by mouth every 8 hours as needed for Nausea or Vomiting, Disp: 20 tablet, Rfl: 0    Insulin Pen Needle (B-D UF III MINI PEN NEEDLES) 31G X 5 MM MISC, BID Dx: E11.9, Disp: 200 each, Rfl: 3    trospium (SANCTURA) 20 MG tablet, Take 1 tablet by mouth 2 times daily, Disp: 60 tablet, Rfl: 5    blood glucose test strips (ONETOUCH ULTRA) strip, TEST TWICE A DAY, Disp: 200 strip, Rfl: 3    nystatin-triamcinolone (MYCOLOG II) 046154-9.1 UNIT/GM-% cream, Apply topically 2 times daily. , Disp: 60 g, Rfl: 5    SOFT TOUCH LANCETS MISC, 1 Cartridge by Does not apply route 2 times daily, Disp: 100 each, Rfl: 3    aspirin EC 81 MG EC tablet, Take 1 tablet by mouth daily. , Disp: 30 tablet, Rfl: 3    Acai 25 MG CAPS, Take  by mouth daily. , Disp: , Rfl:     Krill Oil CAPS, Take 1 tablet by mouth daily.  , Disp: , Rfl:     Calcium Carb-Cholecalciferol 600-1000 MG-UNIT CAPS, Take 1 tablet by mouth daily. , Disp: , Rfl:     therapeutic multivitamin-minerals (THERAGRAN-M) tablet, Take 1 tablet by mouth daily. , Disp: , Rfl:     The patient is allergic to zithromax [azithromycin dihydrate]. Past Medical History  Emily Huffman  has a past medical history of Diabetes mellitus ([address] Diabetes mellitus out of control ([facility] [address] Glucose intolerance (impaired glucose tolerance), HTN (hypertension), and Hyperlipidemia. Past Surgical History  The patient  has a past surgical history that includes lipoma resection (2007); Appendectomy (2006); Knee arthroscopy (12/7/11); Tonsillectomy; Colonoscopy (04/28/2017); colectomy (05/23/2017); and Colonoscopy (11/2017). Family History  This patient's family history includes Cancer in his mother; Diabetes in his mother; Heart Disease in his father; High Blood Pressure in his mother; Stroke in his father. Social History  Emily Huffman  reports that he quit smoking about 16 years ago. His smoking use included cigarettes. He started smoking about 39 years ago. He has a 6.00 pack-year smoking history. He has never used smokeless tobacco. He reports current alcohol use. He reports that he does not use drugs.     Health Maintenance:    Health Maintenance   Topic Date Due    Pneumococcal 0-64 years Vaccine (1 of 2 - PPSV23) Never done    COVID-19 Vaccine (1) Never done    HIV screen  Never done    DTaP/Tdap/Td vaccine (1 - Tdap) Never done    Shingles Vaccine (1 of 2) Never done    Diabetic foot exam  12/06/2019    Diabetic retinal exam  12/06/2020    Flu vaccine (1) Never done    A1C test (Diabetic or Prediabetic)  09/29/2021    Diabetic microalbuminuria test  12/29/2021    Lipid screen  04/17/2022    Potassium monitoring  04/17/2022    Creatinine monitoring  04/17/2022    Colon cancer screen colonoscopy  01/06/2030    Hepatitis C screen  Completed    Hepatitis A vaccine  Aged Out    Hib vaccine  Aged Out    Meningococcal (ACWY) vaccine  Aged Out       Subjective:      Review of Systems   Constitutional: Negative for chills, fatigue and fever. HENT: Negative for congestion, rhinorrhea, sinus pressure, sinus pain, sore throat, tinnitus and trouble swallowing. Eyes: Negative for photophobia and visual disturbance. Respiratory: Negative for cough, shortness of breath and wheezing. Cardiovascular: Negative for chest pain, palpitations and leg swelling. Gastrointestinal: Negative for abdominal distention, abdominal pain, blood in stool, constipation, diarrhea, nausea and vomiting. Endocrine: Negative for polydipsia, polyphagia and polyuria. Genitourinary: Negative for difficulty urinating, dysuria, frequency, hematuria and urgency. Musculoskeletal: Negative for myalgias. Skin: Negative for rash and wound. Neurological: Negative for dizziness, light-headedness and headaches. Psychiatric/Behavioral: Negative for dysphoric mood and sleep disturbance. The patient is not nervous/anxious. Objective:     BP (!) 148/76 (Site: Left Upper Arm, Position: Sitting, Cuff Size: Large Adult)   Temp 97.3 °F (36.3 °C) (Temporal)   Ht 6' 2\" (1.88 m)   Wt (!) 301 lb (136.5 kg)   BMI 38.65 kg/m²     Physical Exam  Constitutional:       General: He is not in acute distress. Appearance: Normal appearance. He is normal weight. He is not ill-appearing. HENT:      Head: Normocephalic and atraumatic. Right Ear: Tympanic membrane, ear canal and external ear normal.      Left Ear: Tympanic membrane, ear canal and external ear normal.      Nose: Nose normal. No congestion or rhinorrhea. Mouth/Throat:      Mouth: Mucous membranes are moist.      Pharynx: Oropharynx is clear. No oropharyngeal exudate or posterior oropharyngeal erythema. Eyes:      Extraocular Movements: Extraocular movements intact.       Conjunctiva/sclera: Conjunctivae normal.      Pupils: Pupils are equal, round, and reactive to light. Neck:      Vascular: No carotid bruit. Cardiovascular:      Rate and Rhythm: Normal rate and regular rhythm. Pulses: Normal pulses. Heart sounds: No murmur heard. No friction rub. No gallop. Pulmonary:      Effort: Pulmonary effort is normal. No respiratory distress. Breath sounds: Normal breath sounds. No wheezing, rhonchi or rales. Abdominal:      General: Abdomen is flat. Bowel sounds are normal. There is no distension. Palpations: Abdomen is soft. Tenderness: There is no abdominal tenderness. Musculoskeletal:         General: No swelling or tenderness. Normal range of motion. Cervical back: Normal range of motion and neck supple. No muscular tenderness. Right lower leg: Edema (trace) present. Left lower leg: Edema (trace) present. Lymphadenopathy:      Cervical: No cervical adenopathy. Skin:     General: Skin is warm and dry. Capillary Refill: Capillary refill takes less than 2 seconds. Findings: No erythema or rash. Neurological:      General: No focal deficit present. Mental Status: He is alert and oriented to person, place, and time. Motor: No weakness. Coordination: Coordination normal.      Deep Tendon Reflexes: Reflexes normal.   Psychiatric:         Mood and Affect: Mood normal.         Behavior: Behavior normal.         Thought Content:  Thought content normal.         Judgment: Judgment normal.         Labs Reviewed 9/29/2021:    Lab Results   Component Value Date    WBC 8.5 04/20/2021    HGB 15.6 04/20/2021    HCT 44.8 04/20/2021     04/20/2021    CHOL 116 04/17/2021    TRIG 176 (H) 04/17/2021    HDL 33 (L) 04/17/2021    LDLDIRECT 65 04/17/2021    ALT 33 04/17/2021    AST 25 04/17/2021     04/17/2021    K 4.2 04/17/2021     04/17/2021    CREATININE 0.96 04/17/2021    BUN 17 04/17/2021    CO2 27 04/17/2021    PSA 0.35 11/19/2018    LABA1C 9.1 (H) 06/29/2021 LABMICR 8.8 11/19/2018       Assessment/Plan      1. Type 2 diabetes mellitus without complication, unspecified whether long term insulin use (HCC)    - POCT glycosylated hemoglobin (Hb A1C)  - Semaglutide,0.25 or 0.5MG/DOS, (OZEMPIC, 0.25 OR 0.5 MG/DOSE,) 2 MG/1.5ML SOPN; Inject 0.5 mg into the skin once a week  Dispense: 12 pen; Refill: 1  - SITagliptin (JANUVIA) 100 MG tablet; TAKE 1 TABLET BY MOUTH EVERY DAY  Dispense: 90 tablet; Refill: 1  - glimepiride (AMARYL) 4 MG tablet; Take 2 tablets by mouth every morning (before breakfast)  Dispense: 180 tablet; Refill: 1  - metFORMIN (GLUCOPHAGE) 1000 MG tablet; Take 1 tablet by mouth 2 times daily (with meals)  Dispense: 180 tablet; Refill: 1  - lisinopril (PRINIVIL;ZESTRIL) 10 MG tablet; 1 tablet daily  Dispense: 90 tablet; Refill: 1  - atorvastatin (LIPITOR) 40 MG tablet; Take 1 tablet by mouth daily  Dispense: 90 tablet; Refill: 1  - Comprehensive Metabolic Panel; Future  - CBC; Future  - Lipid Panel; Future  - Microalbumin / Creatinine Urine Ratio; Future  - Hemoglobin A1C; Future  - Encouraged dietary modifications and exercise regimen to promote optimal glycemic control  - Increase Tresiba to 45 units BID     2. Hypocalcemia    - Calcium, Ionized; Future      Return in about 6 months (around 3/29/2022). Patient given educational materials - see patient instructions. Discussed use, benefit, and side effects of prescribed medications. All patient questions answered. Pt voiced understanding. Reviewed health maintenance.        Electronically signed LIZZ Collier - CNP on 9/29/21 at 8:25 AM EDT

## 2021-10-07 ASSESSMENT — ENCOUNTER SYMPTOMS
WHEEZING: 0
DIARRHEA: 0
BLOOD IN STOOL: 0
CONSTIPATION: 0
COUGH: 0
ABDOMINAL PAIN: 0
RHINORRHEA: 0
SINUS PRESSURE: 0
PHOTOPHOBIA: 0
VOMITING: 0
SHORTNESS OF BREATH: 0
SORE THROAT: 0
TROUBLE SWALLOWING: 0
NAUSEA: 0
SINUS PAIN: 0
ABDOMINAL DISTENTION: 0

## 2021-11-01 ENCOUNTER — OFFICE VISIT (OUTPATIENT)
Dept: INTERNAL MEDICINE CLINIC | Age: 61
End: 2021-11-01
Payer: COMMERCIAL

## 2021-11-01 VITALS
WEIGHT: 298 LBS | HEIGHT: 74 IN | TEMPERATURE: 97.1 F | DIASTOLIC BLOOD PRESSURE: 84 MMHG | SYSTOLIC BLOOD PRESSURE: 144 MMHG | BODY MASS INDEX: 38.24 KG/M2 | HEART RATE: 82 BPM

## 2021-11-01 DIAGNOSIS — J02.9 PHARYNGITIS, UNSPECIFIED ETIOLOGY: Primary | ICD-10-CM

## 2021-11-01 PROCEDURE — G8484 FLU IMMUNIZE NO ADMIN: HCPCS | Performed by: NURSE PRACTITIONER

## 2021-11-01 PROCEDURE — 1036F TOBACCO NON-USER: CPT | Performed by: NURSE PRACTITIONER

## 2021-11-01 PROCEDURE — G8427 DOCREV CUR MEDS BY ELIG CLIN: HCPCS | Performed by: NURSE PRACTITIONER

## 2021-11-01 PROCEDURE — 3017F COLORECTAL CA SCREEN DOC REV: CPT | Performed by: NURSE PRACTITIONER

## 2021-11-01 PROCEDURE — 99212 OFFICE O/P EST SF 10 MIN: CPT | Performed by: NURSE PRACTITIONER

## 2021-11-01 PROCEDURE — G8417 CALC BMI ABV UP PARAM F/U: HCPCS | Performed by: NURSE PRACTITIONER

## 2021-11-01 RX ORDER — AMOXICILLIN 500 MG/1
500 CAPSULE ORAL 2 TIMES DAILY
Qty: 14 CAPSULE | Refills: 0 | Status: SHIPPED | OUTPATIENT
Start: 2021-11-01 | End: 2021-11-08

## 2021-11-01 NOTE — PROGRESS NOTES
[facility]  [address]  Dept: [phone]  Dept Fax: [phone]: [phone]     Visit Date:  11/1/2021    Patient:  Liliam Haines  YOB: 1960    HPI:     Chief Complaint   Patient presents with   Pa Aleksandr presents today for medical evaluation of pharyngitis. States that he started with a severely sore throat that started around 9:00 pm last night. He denies fever. No increased fatigue. Does have sinus congestion and drainage.      Had COVID-19 in 8/2021    Can swallow- uvula is red and swollen    Sugars running 170-180s    Medications    Current Outpatient Medications:     Semaglutide,0.25 or 0.5MG/DOS, (OZEMPIC, 0.25 OR 0.5 MG/DOSE,) 2 MG/1.5ML SOPN, Inject 0.5 mg into the skin once a week, Disp: 12 pen, Rfl: 1    SITagliptin (JANUVIA) 100 MG tablet, TAKE 1 TABLET BY MOUTH EVERY DAY, Disp: 90 tablet, Rfl: 1    glimepiride (AMARYL) 4 MG tablet, Take 2 tablets by mouth every morning (before breakfast), Disp: 180 tablet, Rfl: 1    metFORMIN (GLUCOPHAGE) 1000 MG tablet, Take 1 tablet by mouth 2 times daily (with meals), Disp: 180 tablet, Rfl: 1    lisinopril (PRINIVIL;ZESTRIL) 10 MG tablet, 1 tablet daily, Disp: 90 tablet, Rfl: 1    atorvastatin (LIPITOR) 40 MG tablet, Take 1 tablet by mouth daily, Disp: 90 tablet, Rfl: 1    TRESIBA FLEXTOUCH 100 UNIT/ML SOPN, INJECT 30 UNITS INTO THE SKIN 2 TIMES DAILY (Patient taking differently: 40 Units 2 times daily ), Disp: 60 mL, Rfl: 2    Pseudoephedrine-APAP-DM (DAYQUIL PO), Take by mouth, Disp: , Rfl:     ondansetron (ZOFRAN ODT) 4 MG disintegrating tablet, Take 1 tablet by mouth every 8 hours as needed for Nausea or Vomiting, Disp: 20 tablet, Rfl: 0    Insulin Pen Needle (B-D UF III MINI PEN NEEDLES) 31G X 5 MM MISC, BID Dx: E11.9, Disp: 200 each, Rfl: 3    trospium (SANCTURA) 20 MG tablet, Take 1 tablet by mouth 2 times daily, Disp: 60 tablet, Rfl: 5    blood glucose test strips (ONETOUCH ULTRA) strip, TEST TWICE A DAY, Disp: 200 strip, Rfl: 3    nystatin-triamcinolone (MYCOLOG II) 532072-4.1 UNIT/GM-% cream, Apply topically 2 times daily. , Disp: 60 g, Rfl: 5    SOFT TOUCH LANCETS MISC, 1 Cartridge by Does not apply route 2 times daily, Disp: 100 each, Rfl: 3    aspirin EC 81 MG EC tablet, Take 1 tablet by mouth daily. , Disp: 30 tablet, Rfl: 3    Acai 25 MG CAPS, Take  by mouth daily. , Disp: , Rfl:     Krill Oil CAPS, Take 1 tablet by mouth daily. , Disp: , Rfl:     Calcium Carb-Cholecalciferol 600-1000 MG-UNIT CAPS, Take 1 tablet by mouth daily. , Disp: , Rfl:     therapeutic multivitamin-minerals (THERAGRAN-M) tablet, Take 1 tablet by mouth daily. , Disp: , Rfl:     The patient is allergic to zithromax [azithromycin dihydrate]. Past Medical History  Marah Leal  has a past medical history of Diabetes mellitus ([facility] [address] Diabetes mellitus out of control ([facility] [address] Glucose intolerance (impaired glucose tolerance), HTN (hypertension), and Hyperlipidemia. Past Surgical History  The patient  has a past surgical history that includes lipoma resection (2007); Appendectomy (2006); Knee arthroscopy (12/7/11); Tonsillectomy; Colonoscopy (04/28/2017); colectomy (05/23/2017); and Colonoscopy (11/2017). Family History  This patient's family history includes Cancer in his mother; Diabetes in his mother; Heart Disease in his father; High Blood Pressure in his mother; Stroke in his father. Social History  Marah Leal  reports that he quit smoking about 16 years ago. His smoking use included cigarettes. He started smoking about 39 years ago. He has a 6.00 pack-year smoking history. He has never used smokeless tobacco. He reports current alcohol use. He reports that he does not use drugs.     Health Maintenance:    Health Maintenance   Topic Date Due    Pneumococcal 0-64 years Vaccine (1 of 2 - PPSV23) Never done    COVID-19 Vaccine (1) Never done    HIV screen  Never done    DTaP/Tdap/Td vaccine (1 - Tdap) Never done    Shingles Vaccine (1 of 2) Never done    Diabetic foot exam  12/06/2019    Diabetic retinal exam  12/06/2020    Flu vaccine (1) Never done    A1C test (Diabetic or Prediabetic)  09/29/2021    Diabetic microalbuminuria test  12/29/2021    Lipid screen  04/17/2022    Potassium monitoring  04/17/2022    Creatinine monitoring  04/17/2022    Colon cancer screen colonoscopy  01/06/2030    Hepatitis C screen  Completed    Hepatitis A vaccine  Aged Out    Hib vaccine  Aged Out    Meningococcal (ACWY) vaccine  Aged Out       Subjective:      Review of Systems   Constitutional: Negative for chills, fatigue and fever. HENT: Positive for sinus pressure, sinus pain and sore throat. Negative for congestion, rhinorrhea, tinnitus and trouble swallowing. Eyes: Negative for photophobia and visual disturbance. Respiratory: Negative for cough, shortness of breath and wheezing. Cardiovascular: Negative for chest pain, palpitations and leg swelling. Gastrointestinal: Negative for abdominal distention, abdominal pain, blood in stool, constipation, diarrhea, nausea and vomiting. Endocrine: Negative for polydipsia, polyphagia and polyuria. Genitourinary: Negative for difficulty urinating, dysuria, frequency, hematuria and urgency. Musculoskeletal: Negative for myalgias. Skin: Negative for rash and wound. Neurological: Negative for dizziness, light-headedness and headaches. Psychiatric/Behavioral: Negative for dysphoric mood and sleep disturbance. The patient is not nervous/anxious. Objective:     BP (!) 144/84 (Site: Left Upper Arm, Position: Sitting, Cuff Size: Medium Adult)   Pulse 82   Temp 97.1 °F (36.2 °C) (Temporal)   Ht 6' 2\" (1.88 m)   Wt 298 lb (135.2 kg)   BMI 38.26 kg/m²     Physical Exam  Constitutional:       General: He is not in acute distress.      Appearance: Normal appearance. He is normal weight. He is not ill-appearing. HENT:      Head: Normocephalic and atraumatic. Right Ear: Tympanic membrane, ear canal and external ear normal.      Left Ear: Tympanic membrane, ear canal and external ear normal.      Nose: Nose normal. No congestion or rhinorrhea. Mouth/Throat:      Mouth: Mucous membranes are moist.      Pharynx: Pharyngeal swelling, posterior oropharyngeal erythema and uvula swelling present. No oropharyngeal exudate. Eyes:      Extraocular Movements: Extraocular movements intact. Conjunctiva/sclera: Conjunctivae normal.      Pupils: Pupils are equal, round, and reactive to light. Neck:      Vascular: No carotid bruit. Cardiovascular:      Rate and Rhythm: Normal rate and regular rhythm. Pulses: Normal pulses. Heart sounds: No murmur heard. No friction rub. No gallop. Pulmonary:      Effort: Pulmonary effort is normal. No respiratory distress. Breath sounds: Normal breath sounds. No wheezing, rhonchi or rales. Abdominal:      General: Abdomen is flat. Bowel sounds are normal. There is no distension. Palpations: Abdomen is soft. Tenderness: There is no abdominal tenderness. Musculoskeletal:         General: No swelling or tenderness. Normal range of motion. Cervical back: Normal range of motion and neck supple. No muscular tenderness. Right lower leg: Edema (trace) present. Left lower leg: Edema (trace) present. Lymphadenopathy:      Cervical: No cervical adenopathy. Skin:     General: Skin is warm and dry. Capillary Refill: Capillary refill takes less than 2 seconds. Findings: No erythema or rash. Neurological:      General: No focal deficit present. Mental Status: He is alert and oriented to person, place, and time. Motor: No weakness.       Coordination: Coordination normal.      Deep Tendon Reflexes: Reflexes normal.   Psychiatric:         Mood and Affect: Mood normal.

## 2021-11-01 NOTE — LETTER
[id number] [facility]  [place] [address]  Phone: [phone]  Fax: [phone] [facility], APRN - CNP        November 1, 2021     Patient: Mahendra Victoria   YOB: 1960   Date of Visit: 11/1/2021       To Whom it May Concern:    Shelby Dumont was seen in my clinic on 11/1/2021. He may return to work on 11/03/2021. If you have any questions or concerns, please don't hesitate to call.     Sincerely,         LIZZ Lane CNP

## 2021-11-15 ASSESSMENT — ENCOUNTER SYMPTOMS
ABDOMINAL PAIN: 0
SHORTNESS OF BREATH: 0
DIARRHEA: 0
SINUS PAIN: 1
VOMITING: 0
SORE THROAT: 1
WHEEZING: 0
NAUSEA: 0
CONSTIPATION: 0
TROUBLE SWALLOWING: 0
COUGH: 0
BLOOD IN STOOL: 0
ABDOMINAL DISTENTION: 0
PHOTOPHOBIA: 0
RHINORRHEA: 0
SINUS PRESSURE: 1

## 2022-03-17 ENCOUNTER — OFFICE VISIT (OUTPATIENT)
Dept: FAMILY MEDICINE CLINIC | Age: 62
End: 2022-03-17
Payer: COMMERCIAL

## 2022-03-17 VITALS
RESPIRATION RATE: 18 BRPM | DIASTOLIC BLOOD PRESSURE: 80 MMHG | HEART RATE: 52 BPM | BODY MASS INDEX: 38.29 KG/M2 | WEIGHT: 298.2 LBS | SYSTOLIC BLOOD PRESSURE: 136 MMHG | TEMPERATURE: 97.8 F | OXYGEN SATURATION: 98 %

## 2022-03-17 DIAGNOSIS — M54.16 LUMBAR RADICULOPATHY: Primary | ICD-10-CM

## 2022-03-17 PROCEDURE — G8427 DOCREV CUR MEDS BY ELIG CLIN: HCPCS | Performed by: FAMILY MEDICINE

## 2022-03-17 PROCEDURE — G8417 CALC BMI ABV UP PARAM F/U: HCPCS | Performed by: FAMILY MEDICINE

## 2022-03-17 PROCEDURE — 99213 OFFICE O/P EST LOW 20 MIN: CPT | Performed by: FAMILY MEDICINE

## 2022-03-17 PROCEDURE — G8484 FLU IMMUNIZE NO ADMIN: HCPCS | Performed by: FAMILY MEDICINE

## 2022-03-17 PROCEDURE — 3017F COLORECTAL CA SCREEN DOC REV: CPT | Performed by: FAMILY MEDICINE

## 2022-03-17 PROCEDURE — 1036F TOBACCO NON-USER: CPT | Performed by: FAMILY MEDICINE

## 2022-03-17 RX ORDER — AMLODIPINE BESYLATE 5 MG/1
TABLET ORAL
COMMUNITY
Start: 2022-02-22

## 2022-03-17 RX ORDER — METOPROLOL SUCCINATE 25 MG/1
12.5 TABLET, EXTENDED RELEASE ORAL DAILY
COMMUNITY
Start: 2022-03-17 | End: 2022-07-12

## 2022-03-17 RX ORDER — TIZANIDINE 2 MG/1
2 TABLET ORAL 3 TIMES DAILY PRN
Qty: 30 TABLET | Refills: 0 | Status: SHIPPED | OUTPATIENT
Start: 2022-03-17 | End: 2022-03-29

## 2022-03-17 RX ORDER — HYDROCODONE BITARTRATE AND ACETAMINOPHEN 5; 325 MG/1; MG/1
1 TABLET ORAL EVERY 6 HOURS PRN
Qty: 28 TABLET | Refills: 0 | Status: SHIPPED | OUTPATIENT
Start: 2022-03-17 | End: 2022-03-24

## 2022-03-17 RX ORDER — PREDNISONE 20 MG/1
60 TABLET ORAL DAILY
Qty: 21 TABLET | Refills: 0 | Status: SHIPPED | OUTPATIENT
Start: 2022-03-17 | End: 2022-03-24

## 2022-03-19 ASSESSMENT — ENCOUNTER SYMPTOMS
SHORTNESS OF BREATH: 0
DIARRHEA: 0
ABDOMINAL PAIN: 0
CONSTIPATION: 0
SINUS PRESSURE: 0
RHINORRHEA: 0
NAUSEA: 0
SORE THROAT: 0
COUGH: 0
EYE PAIN: 0
BACK PAIN: 1
ABDOMINAL DISTENTION: 0

## 2022-03-19 NOTE — PROGRESS NOTES
[address] [place] [facility] [address]  Dept: [phone]  Dept Fax: [phone]  Loc: [phone]  PROGRESS NOTE      VisitDate: 3/17/2022    Lea Suazo is a 62 y.o. male who presents today for:     Chief Complaint   Patient presents with    Back Pain     was cutting wood Saturday, pain started Monday, right side LBP travels thru groin and into right leg, pain keeping him awake          Subjective:  HPI  Patient presents with onset of low back pain earlier this week that started after he had been cutting wood. Does go down into his legs and hips. No numbness or tingling no change in bowel or bladder control. No relief with over-the-counter medications, pain increases with activity    Review of Systems   Constitutional: Negative for appetite change and fever. HENT: Negative for congestion, ear pain, postnasal drip, rhinorrhea, sinus pressure and sore throat. Eyes: Negative for pain and visual disturbance. Respiratory: Negative for cough and shortness of breath. Cardiovascular: Negative for chest pain. Gastrointestinal: Negative for abdominal distention, abdominal pain, constipation, diarrhea and nausea. Genitourinary: Negative for dysuria, frequency and urgency. Musculoskeletal: Positive for back pain. Negative for arthralgias. Skin: Negative for rash. Neurological: Negative for dizziness.      Past Medical History:   Diagnosis Date    Diabetes mellitus ([address]     Diabetes mellitus out of control ([address]     Glucose intolerance (impaired glucose tolerance)     HTN (hypertension)     Hyperlipidemia       Past Surgical History:   Procedure Laterality Date    APPENDECTOMY  2006    COLECTOMY  05/23/2017    Robotic sigmoid     COLONOSCOPY  04/28/2017    Maykel How COLONOSCOPY  11/2017    Dr. Loli Nelson 1 yr follow-up     KNEE ARTHROSCOPY  12/7/11       Right lateral meniscectomy    LIPOMA RESECTION  2007 forehead 2007 Dr Naomy Garcia      as a child     Family History   Problem Relation Age of Onset    Diabetes Mother     High Blood Pressure Mother     Cancer Mother         breast, skin    Heart Disease Father     Stroke Father      Social History     Tobacco Use    Smoking status: Former Smoker     Packs/day: 0.50     Years: 12.00     Pack years: 6.00     Types: Cigarettes     Start date: 1982     Quit date: 2005     Years since quittin.2    Smokeless tobacco: Never Used   Substance Use Topics    Alcohol use: Yes     Comment: RARELY      Current Outpatient Medications   Medication Sig Dispense Refill    amLODIPine (NORVASC) 5 MG tablet Amlodipine Active 5 MG PO Daily  4:38pm      metoprolol succinate (TOPROL XL) 25 MG extended release tablet       predniSONE (DELTASONE) 20 MG tablet Take 3 tablets by mouth daily for 7 days Take with food 21 tablet 0    tiZANidine (ZANAFLEX) 2 MG tablet Take 1 tablet by mouth 3 times daily as needed (back spasms) 30 tablet 0    HYDROcodone-acetaminophen (NORCO) 5-325 MG per tablet Take 1 tablet by mouth every 6 hours as needed for Pain for up to 7 days. Intended supply: 7 days.  Take lowest dose possible to manage pain 28 tablet 0    SITagliptin (JANUVIA) 100 MG tablet TAKE 1 TABLET BY MOUTH EVERY DAY 90 tablet 1    glimepiride (AMARYL) 4 MG tablet Take 2 tablets by mouth every morning (before breakfast) 180 tablet 1    metFORMIN (GLUCOPHAGE) 1000 MG tablet Take 1 tablet by mouth 2 times daily (with meals) 180 tablet 1    lisinopril (PRINIVIL;ZESTRIL) 10 MG tablet 1 tablet daily 90 tablet 1    atorvastatin (LIPITOR) 40 MG tablet Take 1 tablet by mouth daily 90 tablet 1    TRESIBA FLEXTOUCH 100 UNIT/ML SOPN INJECT 30 UNITS INTO THE SKIN 2 TIMES DAILY (Patient taking differently: 40 Units 2 times daily ) 60 mL 2    Pseudoephedrine-APAP-DM (DAYQUIL PO) Take by mouth      Insulin Pen Needle (B-D UF III MINI PEN NEEDLES) 31G X 5 MM MISC BID Dx: E11.9 200 each 3    blood glucose test strips (ONETOUCH ULTRA) strip TEST TWICE A  strip 3    nystatin-triamcinolone (MYCOLOG II) 789677-9.1 UNIT/GM-% cream Apply topically 2 times daily. 60 g 5    SOFT TOUCH LANCETS MISC 1 Cartridge by Does not apply route 2 times daily 100 each 3    aspirin EC 81 MG EC tablet Take 1 tablet by mouth daily. 30 tablet 3    Krill Oil CAPS Take 1 tablet by mouth daily.  therapeutic multivitamin-minerals (THERAGRAN-M) tablet Take 1 tablet by mouth daily. No current facility-administered medications for this visit. Allergies   Allergen Reactions    Zithromax [Azithromycin Dihydrate] Anaphylaxis     Throat swelling     Health Maintenance   Topic Date Due    COVID-19 Vaccine (1) Never done    Pneumococcal 0-64 years Vaccine (1 of 2 - PPSV23) Never done    HIV screen  Never done    DTaP/Tdap/Td vaccine (1 - Tdap) Never done    Shingles Vaccine (1 of 2) Never done    Diabetic foot exam  12/06/2019    Diabetic retinal exam  12/06/2020    Flu vaccine (1) Never done    A1C test (Diabetic or Prediabetic)  09/29/2021    Diabetic microalbuminuria test  12/29/2021    Lipid screen  04/17/2022    Depression Screen  06/29/2022    Potassium monitoring  02/21/2023    Creatinine monitoring  02/21/2023    Colorectal Cancer Screen  01/06/2030    Hepatitis C screen  Completed    Hepatitis A vaccine  Aged Out    Hib vaccine  Aged Out    Meningococcal (ACWY) vaccine  Aged Out         Objective:     Physical Exam  Constitutional:       General: He is not in acute distress. Appearance: He is well-developed. He is not diaphoretic. HENT:      Head: Normocephalic and atraumatic. Right Ear: External ear normal.      Left Ear: External ear normal.   Eyes:      Conjunctiva/sclera: Conjunctivae normal.   Neck:      Vascular: No JVD. Cardiovascular:      Rate and Rhythm: Normal rate and regular rhythm.       Heart sounds: Normal heart sounds. Pulmonary:      Effort: Pulmonary effort is normal.      Breath sounds: Normal breath sounds. No wheezing or rales. Musculoskeletal:         General: Tenderness present. Comments: Straight leg raise negative, deep tendon reflexes symmetric in the lower extremities, mild tenderness of the lumbar paraspinous muscle   Skin:     General: Skin is warm and dry. Coloration: Skin is not pale. Neurological:      Mental Status: He is alert and oriented to person, place, and time. /80 (Site: Left Upper Arm)   Pulse 52   Temp 97.8 °F (36.6 °C) (Oral)   Resp 18   Wt 298 lb 3.2 oz (135.3 kg)   SpO2 98%   BMI 38.29 kg/m²       Impression/Plan:  1. Lumbar radiculopathy      Requested Prescriptions     Signed Prescriptions Disp Refills    predniSONE (DELTASONE) 20 MG tablet 21 tablet 0     Sig: Take 3 tablets by mouth daily for 7 days Take with food    tiZANidine (ZANAFLEX) 2 MG tablet 30 tablet 0     Sig: Take 1 tablet by mouth 3 times daily as needed (back spasms)    HYDROcodone-acetaminophen (NORCO) 5-325 MG per tablet 28 tablet 0     Sig: Take 1 tablet by mouth every 6 hours as needed for Pain for up to 7 days. Intended supply: 7 days. Take lowest dose possible to manage pain     No orders of the defined types were placed in this encounter. Patient giveneducational materials - see patient instructions. Discussed use, benefit, and side effects of prescribed medications. All patient questions answered. Pt voiced understanding. Reviewed health maintenance. Patient agreedwith treatment plan. Follow up as directed. **This report has been created using voice recognition software. It may contain minor errorswhich are inherent in voice recognition technology. **       Electronically signed by Ignacia Monroy MD on 3/19/2022 at 10:04 AM

## 2022-03-29 ENCOUNTER — OFFICE VISIT (OUTPATIENT)
Dept: INTERNAL MEDICINE CLINIC | Age: 62
End: 2022-03-29
Payer: COMMERCIAL

## 2022-03-29 VITALS
DIASTOLIC BLOOD PRESSURE: 80 MMHG | WEIGHT: 305 LBS | HEIGHT: 74 IN | BODY MASS INDEX: 39.14 KG/M2 | SYSTOLIC BLOOD PRESSURE: 156 MMHG

## 2022-03-29 DIAGNOSIS — E11.9 TYPE 2 DIABETES MELLITUS WITHOUT COMPLICATION, UNSPECIFIED WHETHER LONG TERM INSULIN USE (HCC): Primary | ICD-10-CM

## 2022-03-29 LAB — HBA1C MFR BLD: 8.9 % (ref 4.3–5.7)

## 2022-03-29 PROCEDURE — 2022F DILAT RTA XM EVC RTNOPTHY: CPT | Performed by: NURSE PRACTITIONER

## 2022-03-29 PROCEDURE — 99213 OFFICE O/P EST LOW 20 MIN: CPT | Performed by: NURSE PRACTITIONER

## 2022-03-29 PROCEDURE — G8417 CALC BMI ABV UP PARAM F/U: HCPCS | Performed by: NURSE PRACTITIONER

## 2022-03-29 PROCEDURE — 3052F HG A1C>EQUAL 8.0%<EQUAL 9.0%: CPT | Performed by: NURSE PRACTITIONER

## 2022-03-29 PROCEDURE — 83036 HEMOGLOBIN GLYCOSYLATED A1C: CPT | Performed by: NURSE PRACTITIONER

## 2022-03-29 PROCEDURE — G8427 DOCREV CUR MEDS BY ELIG CLIN: HCPCS | Performed by: NURSE PRACTITIONER

## 2022-03-29 PROCEDURE — 3017F COLORECTAL CA SCREEN DOC REV: CPT | Performed by: NURSE PRACTITIONER

## 2022-03-29 PROCEDURE — G8484 FLU IMMUNIZE NO ADMIN: HCPCS | Performed by: NURSE PRACTITIONER

## 2022-03-29 PROCEDURE — 1036F TOBACCO NON-USER: CPT | Performed by: NURSE PRACTITIONER

## 2022-03-29 RX ORDER — INSULIN DEGLUDEC INJECTION 100 U/ML
50 INJECTION, SOLUTION SUBCUTANEOUS 2 TIMES DAILY
Qty: 60 ML | Refills: 2 | Status: SHIPPED | OUTPATIENT
Start: 2022-03-29 | End: 2022-05-18

## 2022-03-29 ASSESSMENT — ENCOUNTER SYMPTOMS
ABDOMINAL DISTENTION: 0
VOMITING: 0
ABDOMINAL PAIN: 0
BLOOD IN STOOL: 0
SORE THROAT: 0
PHOTOPHOBIA: 0
DIARRHEA: 0
RHINORRHEA: 0
SINUS PAIN: 0
NAUSEA: 0
WHEEZING: 0
CONSTIPATION: 0
TROUBLE SWALLOWING: 0
SINUS PRESSURE: 0
SHORTNESS OF BREATH: 0
COUGH: 0

## 2022-03-29 NOTE — PATIENT INSTRUCTIONS
Check with insurance on what basal insulin is covered bet (Toujeo, Lantus, Levemir, Basaglar, and tresiba)     Once [address] runs out, stop it

## 2022-03-29 NOTE — PROGRESS NOTES
[facility]  [place]  Dept: [phone]  Dept Fax: [phone]: [phone]     Visit Date:  3/29/2022    Patient:  Den Rapp  YOB: 1960    HPI:     Chief Complaint   Patient presents with    6 Month Follow-Up    Diabetes     Nate Kolb (:  1960) is a 57 y. o. male, here for evaluation of the following medical concerns: diabetes     I last seen Robbie 3 months ago. He follows routinely with Dr. Christiano Garrison     Heart catheterization with Dr. Devi Pi within 1 year, 50% coronary artery stenosis. No stents needed. Since last visit, he has had some episodes of SVT. One requiring hospitalization at [facility]. Planning an ablation at [facility]. Last episode 1 month ago.      Diabetes-  Diagnosed 7-8 years ago. He states his diabetes is not well controlled.  A1C 8.9% today, was 8.6% previously. Is on metformin 1000 mg BID, glimepiride 4 mg BID, sitagliptin 100 mg daily, and Tresiba 44 units BID. He did not tolerate Invokana, caused increased urination. States that he misses oral medications occasionally. Is on lisinopril 20 mg daily, with last urinary microalbumin/crt Bartholome Bone 2020. States hypoglycemic events not present. Is able to voice signs/symptoms of hypoglycemia as dizziness and diaphoresis.  Reports checking glucose 1 time per day, with range of glucose readings 144-312 per meter download. Attempting dietary modification for diabetes management and/or weight loss. Currently follows with diabetes clinic, dietician, CDE. Does report difficulty with obtaining medications. Last eye exam 2020. Dr. Harrison Anderson retired. BP reccs <140/90 (<130/80 in CVD risk). On aspirin 81 mg daily and atorvastatin 40 mg daily.      Denies polyuria, polydipsia, polyphagia. Reports occasionally neuropathic symptoms including numbness, tingling.  Does not have any wounds to feet.        Most recent labs in 4/2021, revealed a calcium of 9.5. He denies any bone fragility. No fractures. Ionized calcium 1.23 in 12/2020. He is on calcium 600 mg daily. Did not repeat labs.      Is following with urology for urinary issues and ED. Scheduled to see Dr. Rhina Becker Friday. Medications    Current Outpatient Medications:     Insulin Degludec (TRESIBA FLEXTOUCH) 100 UNIT/ML SOPN, Inject 50 Units into the skin in the morning and at bedtime, Disp: 60 mL, Rfl: 2    amLODIPine (NORVASC) 5 MG tablet, Amlodipine Active 5 MG PO Daily 30 February 22nd, 2022 4:38pm, Disp: , Rfl:     metoprolol succinate (TOPROL XL) 25 MG extended release tablet, 25 mg daily , Disp: , Rfl:     SITagliptin (JANUVIA) 100 MG tablet, TAKE 1 TABLET BY MOUTH EVERY DAY, Disp: 90 tablet, Rfl: 1    glimepiride (AMARYL) 4 MG tablet, Take 2 tablets by mouth every morning (before breakfast), Disp: 180 tablet, Rfl: 1    metFORMIN (GLUCOPHAGE) 1000 MG tablet, Take 1 tablet by mouth 2 times daily (with meals), Disp: 180 tablet, Rfl: 1    lisinopril (PRINIVIL;ZESTRIL) 10 MG tablet, 1 tablet daily (Patient taking differently: 10 mg 2 times daily ), Disp: 90 tablet, Rfl: 1    atorvastatin (LIPITOR) 40 MG tablet, Take 1 tablet by mouth daily, Disp: 90 tablet, Rfl: 1    Insulin Pen Needle (B-D UF III MINI PEN NEEDLES) 31G X 5 MM MISC, BID Dx: E11.9, Disp: 200 each, Rfl: 3    blood glucose test strips (ONETOUCH ULTRA) strip, TEST TWICE A DAY, Disp: 200 strip, Rfl: 3    nystatin-triamcinolone (MYCOLOG II) 284706-8.1 UNIT/GM-% cream, Apply topically 2 times daily. , Disp: 60 g, Rfl: 5    SOFT TOUCH LANCETS MISC, 1 Cartridge by Does not apply route 2 times daily, Disp: 100 each, Rfl: 3    aspirin EC 81 MG EC tablet, Take 1 tablet by mouth daily. , Disp: 30 tablet, Rfl: 3    Krill Oil CAPS, Take 1 tablet by mouth daily. , Disp: , Rfl:     therapeutic multivitamin-minerals (THERAGRAN-M) tablet, Take 1 tablet by mouth daily.   , Disp: , Rfl:     The patient is allergic to zithromax [azithromycin dihydrate]. Past Medical History  Leslie Paniagua  has a past medical history of Diabetes mellitus ([address] Diabetes mellitus out of control ([address] Glucose intolerance (impaired glucose tolerance), HTN (hypertension), and Hyperlipidemia. Past Surgical History  The patient  has a past surgical history that includes lipoma resection (2007); Appendectomy (2006); Knee arthroscopy (12/7/11); Tonsillectomy; Colonoscopy (04/28/2017); colectomy (05/23/2017); and Colonoscopy (11/2017). Family History  This patient's family history includes Cancer in his mother; Diabetes in his mother; Heart Disease in his father; High Blood Pressure in his mother; Stroke in his father. Social History  Leslie Paniagua  reports that he quit smoking about 17 years ago. His smoking use included cigarettes. He started smoking about 40 years ago. He has a 6.00 pack-year smoking history. He has never used smokeless tobacco. He reports current alcohol use. He reports that he does not use drugs. Health Maintenance:    Health Maintenance   Topic Date Due    COVID-19 Vaccine (1) Never done    Pneumococcal 0-64 years Vaccine (1 of 2 - PPSV23) Never done    HIV screen  Never done    DTaP/Tdap/Td vaccine (1 - Tdap) Never done    Shingles Vaccine (1 of 2) Never done    Diabetic foot exam  12/06/2019    Diabetic retinal exam  12/06/2020    Flu vaccine (1) Never done    Diabetic microalbuminuria test  12/29/2021    Lipid screen  04/17/2022    Depression Screen  06/29/2022    Potassium monitoring  02/21/2023    Creatinine monitoring  02/21/2023    A1C test (Diabetic or Prediabetic)  03/29/2023    Colorectal Cancer Screen  01/06/2030    Hepatitis C screen  Completed    Hepatitis A vaccine  Aged Out    Hib vaccine  Aged Out    Meningococcal (ACWY) vaccine  Aged Out       Subjective:      Review of Systems   Constitutional: Negative for chills, fatigue and fever.    HENT: Negative for congestion, rhinorrhea, sinus pressure, sinus pain, sore throat, tinnitus and trouble swallowing. Eyes: Negative for photophobia and visual disturbance. Respiratory: Negative for cough, shortness of breath and wheezing. Cardiovascular: Negative for chest pain, palpitations and leg swelling. Gastrointestinal: Negative for abdominal distention, abdominal pain, blood in stool, constipation, diarrhea, nausea and vomiting. Endocrine: Positive for polyuria. Negative for polydipsia and polyphagia. Genitourinary: Negative for difficulty urinating, dysuria, frequency, hematuria and urgency. Musculoskeletal: Negative for myalgias. Skin: Negative for rash and wound. Neurological: Negative for dizziness, light-headedness and headaches. Psychiatric/Behavioral: Negative for dysphoric mood and sleep disturbance. The patient is not nervous/anxious. Objective:     BP (!) 156/80 (Site: Left Upper Arm, Position: Sitting, Cuff Size: Large Adult)   Ht 6' 2\" (1.88 m)   Wt (!) 305 lb (138.3 kg)   BMI 39.16 kg/m²     Physical Exam  Constitutional:       General: He is not in acute distress. Appearance: Normal appearance. He is normal weight. He is not ill-appearing. HENT:      Head: Normocephalic and atraumatic. Right Ear: Tympanic membrane, ear canal and external ear normal.      Left Ear: Tympanic membrane, ear canal and external ear normal.      Nose: Nose normal. No congestion or rhinorrhea. Mouth/Throat:      Mouth: Mucous membranes are moist.      Pharynx: Oropharynx is clear. No oropharyngeal exudate or posterior oropharyngeal erythema. Eyes:      Extraocular Movements: Extraocular movements intact. Conjunctiva/sclera: Conjunctivae normal.      Pupils: Pupils are equal, round, and reactive to light. Neck:      Vascular: No carotid bruit. Cardiovascular:      Rate and Rhythm: Normal rate and regular rhythm. Pulses: Normal pulses. Heart sounds: No murmur heard. No friction rub. No gallop. Pulmonary:      Effort: Pulmonary effort is normal. No respiratory distress. Breath sounds: Normal breath sounds. No wheezing, rhonchi or rales. Abdominal:      General: Abdomen is flat. Bowel sounds are normal. There is no distension. Palpations: Abdomen is soft. Tenderness: There is no abdominal tenderness. Musculoskeletal:         General: No swelling or tenderness. Normal range of motion. Cervical back: Normal range of motion and neck supple. No muscular tenderness. Right lower leg: Edema (trace) present. Left lower leg: Edema (trace) present. Lymphadenopathy:      Cervical: No cervical adenopathy. Skin:     General: Skin is warm and dry. Capillary Refill: Capillary refill takes less than 2 seconds. Findings: No erythema or rash. Neurological:      General: No focal deficit present. Mental Status: He is alert and oriented to person, place, and time. Motor: No weakness. Coordination: Coordination normal.      Deep Tendon Reflexes: Reflexes normal.   Psychiatric:         Mood and Affect: Mood normal.         Behavior: Behavior normal.         Thought Content: Thought content normal.         Judgment: Judgment normal.         Labs Reviewed 3/29/2022:    Lab Results   Component Value Date    WBC 9.8 02/21/2022    HGB 16.6 (H) 02/21/2022    HCT 48.7 02/21/2022     02/21/2022    CHOL 116 04/17/2021    TRIG 176 (H) 04/17/2021    HDL 33 (L) 04/17/2021    LDLDIRECT 65 04/17/2021    ALT 33 04/17/2021    AST 25 04/17/2021     02/21/2022    K 5.4 (H) 02/21/2022     02/21/2022    CREATININE 1.02 02/21/2022    BUN 19 02/21/2022    CO2 29 02/21/2022    PSA 0.35 11/19/2018    INR 1.00 02/21/2022    LABA1C 8.9 (H) 03/29/2022    LABMICR 8.8 11/19/2018       Assessment/Plan      1.  Type 2 diabetes mellitus without complication, unspecified whether long term insulin use (HCC)    - POCT glycosylated hemoglobin (Hb A1C)  - Insulin Degludec (TRESIBA FLEXTOUCH) 100 UNIT/ML SOPN; Inject 50 Units into the skin in the morning and at bedtime  Dispense: 60 mL; Refill: 2  - [facility] - Marlene Ba MD, Opthalmology, [address] [place] [address] are not affordable. Once Januvia is gone, he will stop it. Patient is to call insurance company and see what basal insulin is most affordable and let office know  - Discussed GLP1, SGLT2, and/or addition of basal insulin. Also discussed CGM. Patient will consider all options, does not wish to start any today. - Encouraged dietary modifications and exercise regimen to promote optimal glycemic control        Return in about 3 months (around 6/29/2022). Patient given educational materials - see patient instructions. Discussed use, benefit, and side effects of prescribed medications. All patient questions answered. Pt voiced understanding. Reviewed health maintenance.        Electronically signed LIZZ Pearl - CNP on 3/29/22 at 8:24 AM EDT

## 2022-04-04 ENCOUNTER — TELEPHONE (OUTPATIENT)
Dept: INTERNAL MEDICINE CLINIC | Age: 62
End: 2022-04-04

## 2022-04-04 NOTE — TELEPHONE ENCOUNTER
LVM with pt to inform pt referral to [facility] was faxed and completed. [facility] prefers pt to call to set up appt. Informed pt he can call [facility] @ [phone]    Informed pt he can return my call @ [phone] if she had any questions or concerns.

## 2022-05-07 ENCOUNTER — HOSPITAL ENCOUNTER (EMERGENCY)
Age: 62
Discharge: HOME | End: 2022-05-07
Payer: COMMERCIAL

## 2022-05-07 VITALS
WEIGHT: 305 LBS | OXYGEN SATURATION: 97 % | HEART RATE: 60 BPM | SYSTOLIC BLOOD PRESSURE: 173 MMHG | RESPIRATION RATE: 18 BRPM | DIASTOLIC BLOOD PRESSURE: 87 MMHG | BODY MASS INDEX: 39.16 KG/M2 | TEMPERATURE: 98.9 F

## 2022-05-07 DIAGNOSIS — J03.90 ACUTE TONSILLITIS, UNSPECIFIED ETIOLOGY: Primary | ICD-10-CM

## 2022-05-07 PROCEDURE — 99213 OFFICE O/P EST LOW 20 MIN: CPT

## 2022-05-07 PROCEDURE — 99213 OFFICE O/P EST LOW 20 MIN: CPT | Performed by: NURSE PRACTITIONER

## 2022-05-07 RX ORDER — AMOXICILLIN 500 MG/1
500 CAPSULE ORAL 2 TIMES DAILY
Qty: 20 CAPSULE | Refills: 0 | Status: SHIPPED | OUTPATIENT
Start: 2022-05-07 | End: 2022-05-17

## 2022-05-07 ASSESSMENT — ENCOUNTER SYMPTOMS
RHINORRHEA: 0
COUGH: 0
NAUSEA: 0
SHORTNESS OF BREATH: 0
SORE THROAT: 1
VOMITING: 0
CHEST TIGHTNESS: 0
DIARRHEA: 0

## 2022-05-07 ASSESSMENT — PAIN SCALES - GENERAL: PAINLEVEL_OUTOF10: 6

## 2022-05-07 ASSESSMENT — PAIN DESCRIPTION - LOCATION: LOCATION: THROAT;JAW

## 2022-05-07 ASSESSMENT — PAIN DESCRIPTION - DESCRIPTORS: DESCRIPTORS: SORE

## 2022-05-07 ASSESSMENT — PAIN - FUNCTIONAL ASSESSMENT
PAIN_FUNCTIONAL_ASSESSMENT: PREVENTS OR INTERFERES SOME ACTIVE ACTIVITIES AND ADLS
PAIN_FUNCTIONAL_ASSESSMENT: 0-10

## 2022-05-07 NOTE — ED PROVIDER NOTES
[facility]  Urgent Care Encounter       CHIEF COMPLAINT       Chief Complaint   Patient presents with    Jaw Pain     sore throat       Nurses Notes reviewed and I agree except as noted in the HPI. HISTORY OF PRESENT ILLNESS   Amy Hernández is a 62 y.o. male who presents to the [address] urgent care for evaluation of pharyngitis. He reports his symptoms started roughly 3 to 4 days ago. He denies fever or chills. He denies nasal congestion, rhinorrhea, or postnasal drainage. He denies emesis, nausea, or diarrhea. He does report lymph node enlargement and bilateral jaw pain. He denies chest pain, dyspnea, or bilateral arm discomfort. He denies cardiac history. The history is provided by the patient. No  was used. REVIEW OF SYSTEMS     Review of Systems   Constitutional: Negative for activity change, appetite change, chills, fatigue and fever. HENT: Positive for sore throat. Negative for ear discharge, ear pain and rhinorrhea. Respiratory: Negative for cough, chest tightness and shortness of breath. Cardiovascular: Negative for chest pain. Gastrointestinal: Negative for diarrhea, nausea and vomiting. Genitourinary: Negative for dysuria. Skin: Negative for rash. Allergic/Immunologic: Negative for environmental allergies and food allergies. Neurological: Negative for dizziness and headaches. PAST MEDICAL HISTORY         Diagnosis Date    Diabetes mellitus ([address]     Diabetes mellitus out of control ([address]     Glucose intolerance (impaired glucose tolerance)     HTN (hypertension)     Hyperlipidemia        SURGICALHISTORY     Patient  has a past surgical history that includes lipoma resection (2007); Appendectomy (2006); Knee arthroscopy (12/7/11); Tonsillectomy; Colonoscopy (04/28/2017); colectomy (05/23/2017); and Colonoscopy (11/2017).     CURRENT MEDICATIONS       Discharge Medication List as of 5/7/2022  9:34 AM      CONTINUE these medications which have NOT CHANGED    Details   Insulin Degludec (TRESIBA FLEXTOUCH) 100 UNIT/ML SOPN Inject 50 Units into the skin in the morning and at bedtime, Disp-60 mL, R-2Normal      amLODIPine (NORVASC) 5 MG tablet Amlodipine Active 5 MG PO Daily 30 February 22nd, 2022 4:38pmHistorical Med      metoprolol succinate (TOPROL XL) 25 MG extended release tablet 25 mg daily Historical Med      SITagliptin (JANUVIA) 100 MG tablet TAKE 1 TABLET BY MOUTH EVERY DAY, Disp-90 tablet, R-1Normal      glimepiride (AMARYL) 4 MG tablet Take 2 tablets by mouth every morning (before breakfast), Disp-180 tablet, R-1Normal      metFORMIN (GLUCOPHAGE) 1000 MG tablet Take 1 tablet by mouth 2 times daily (with meals), Disp-180 tablet, R-1Normal      lisinopril (PRINIVIL;ZESTRIL) 10 MG tablet 1 tablet daily, Disp-90 tablet, R-1Normal      atorvastatin (LIPITOR) 40 MG tablet Take 1 tablet by mouth daily, Disp-90 tablet, R-1Normal      Insulin Pen Needle (B-D UF III MINI PEN NEEDLES) 31G X 5 MM MISC Disp-200 each, R-3, NormalBID Dx: E11.9      blood glucose test strips (ONETOUCH ULTRA) strip TEST TWICE A DAY, Disp-200 strip, R-3Normal      nystatin-triamcinolone (MYCOLOG II) 672070-7.1 UNIT/GM-% cream Apply topically 2 times daily. , Disp-60 g, R-5, Normal      SOFT TOUCH LANCETS MISC 2 TIMES DAILY Starting 5/12/2016, Until Discontinued, Disp-100 each, R-3, Normal      aspirin EC 81 MG EC tablet Take 1 tablet by mouth daily. , Disp-30 tablet, R-3      Krill Oil CAPS Take 1 tablet by mouth daily. therapeutic multivitamin-minerals (THERAGRAN-M) tablet Take 1 tablet by mouth daily. ALLERGIES     Patient is is allergic to zithromax [azithromycin dihydrate]. Patients   There is no immunization history on file for this patient.     FAMILY HISTORY     Patient's family history includes Cancer in his mother; Diabetes in his mother; Heart Disease in his father; High Blood Pressure in his mother; Stroke in his father. SOCIAL HISTORY     Patient  reports that he quit smoking about 17 years ago. His smoking use included cigarettes. He started smoking about 40 years ago. He has a 6.00 pack-year smoking history. He has never used smokeless tobacco. He reports current alcohol use. He reports that he does not use drugs. PHYSICAL EXAM     ED TRIAGE VITALS  BP: (!) 173/87, Temp: 98.9 °F (37.2 °C), Pulse: 60, Resp: 18, SpO2: 97 %,Estimated body mass index is 39.16 kg/m² as calculated from the following:    Height as of 3/29/22: 6' 2\" (1.88 m). Weight as of this encounter: 305 lb (138.3 kg). ,No LMP for male patient. Physical Exam  Vitals and nursing note reviewed. Constitutional:       General: He is not in acute distress. Appearance: Normal appearance. He is not ill-appearing, toxic-appearing or diaphoretic. HENT:      Head: Normocephalic. Right Ear: Ear canal and external ear normal.      Left Ear: Ear canal and external ear normal.      Nose: Nose normal. No congestion or rhinorrhea. Mouth/Throat:      Mouth: Mucous membranes are moist.      Pharynx: Oropharynx is clear. Posterior oropharyngeal erythema present. No oropharyngeal exudate. Cardiovascular:      Rate and Rhythm: Normal rate. Pulses: Normal pulses. Pulmonary:      Effort: Pulmonary effort is normal. No respiratory distress. Breath sounds: No stridor. No wheezing or rhonchi. Abdominal:      General: Abdomen is flat. Bowel sounds are normal.      Palpations: Abdomen is soft. Musculoskeletal:         General: No swelling or tenderness. Normal range of motion. Cervical back: Normal range of motion. Neurological:      General: No focal deficit present. Mental Status: He is alert and oriented to person, place, and time. Psychiatric:         Mood and Affect: Mood normal.         Behavior: Behavior normal.         DIAGNOSTIC RESULTS     Labs:No results found for this visit on 05/07/22.     IMAGING:    No orders to display         EKG: None      URGENT CARE COURSE:     Vitals:    05/07/22 0913   BP: (!) 173/87   Pulse: 60   Resp: 18   Temp: 98.9 °F (37.2 °C)   SpO2: 97%   Weight: (!) 305 lb (138.3 kg)       Medications - No data to display         PROCEDURES:  None    FINAL IMPRESSION      1. Acute tonsillitis, unspecified etiology          DISPOSITION/ PLAN     Patient seen and evaluated for pharyngitis. Assessment consistent with likely acute tonsillitis. He is provided a prescription for amoxicillin. Instructed to use over-the-counter Tylenol and Motrin for pain or fever. Instructed to follow-up with his PCP in 3 to 5 days and worsening symptoms. He is agreeable to above plan denies questions or concerns at this time.       PATIENT REFERRED TO:  John Lee MD  [address] / [place] [id number]      DISCHARGE MEDICATIONS:  Discharge Medication List as of 5/7/2022  9:34 AM      START taking these medications    Details   amoxicillin (AMOXIL) 500 MG capsule Take 1 capsule by mouth 2 times daily for 10 days, Disp-20 capsule, R-0Normal             Discharge Medication List as of 5/7/2022  9:34 AM          Discharge Medication List as of 5/7/2022  9:34 AM          LIZZ Mena CNP    (Please note that portions of this note were completed with a voice recognition program. Efforts were made to edit the dictations but occasionally words are mis-transcribed.)           LIZZ Mena CNP  05/07/22 9563

## 2022-05-17 DIAGNOSIS — E11.9 TYPE 2 DIABETES MELLITUS WITHOUT COMPLICATION, UNSPECIFIED WHETHER LONG TERM INSULIN USE (HCC): ICD-10-CM

## 2022-05-17 NOTE — PATIENT INSTRUCTIONS
You will be contacted next week to discuss dates and specific instructions for the EPS/ ablation . [facility]   [phone] opt 1    You may receive a survey regarding the care you received during your visit. Your input is valuable to us. We encourage you to complete and return your survey. We hope you will choose us in the future for your healthcare needs.

## 2022-05-18 ENCOUNTER — OFFICE VISIT (OUTPATIENT)
Dept: CARDIOLOGY CLINIC | Age: 62
End: 2022-05-18
Payer: COMMERCIAL

## 2022-05-18 VITALS
HEART RATE: 51 BPM | HEIGHT: 75 IN | SYSTOLIC BLOOD PRESSURE: 152 MMHG | WEIGHT: 304.6 LBS | DIASTOLIC BLOOD PRESSURE: 73 MMHG | BODY MASS INDEX: 37.87 KG/M2

## 2022-05-18 DIAGNOSIS — I47.1 SVT (SUPRAVENTRICULAR TACHYCARDIA) (HCC): Primary | ICD-10-CM

## 2022-05-18 DIAGNOSIS — R00.1 BRADYCARDIA: ICD-10-CM

## 2022-05-18 PROCEDURE — 99204 OFFICE O/P NEW MOD 45 MIN: CPT | Performed by: INTERNAL MEDICINE

## 2022-05-18 PROCEDURE — 3017F COLORECTAL CA SCREEN DOC REV: CPT | Performed by: INTERNAL MEDICINE

## 2022-05-18 PROCEDURE — 93000 ELECTROCARDIOGRAM COMPLETE: CPT | Performed by: INTERNAL MEDICINE

## 2022-05-18 PROCEDURE — G8417 CALC BMI ABV UP PARAM F/U: HCPCS | Performed by: INTERNAL MEDICINE

## 2022-05-18 PROCEDURE — 1036F TOBACCO NON-USER: CPT | Performed by: INTERNAL MEDICINE

## 2022-05-18 PROCEDURE — G8427 DOCREV CUR MEDS BY ELIG CLIN: HCPCS | Performed by: INTERNAL MEDICINE

## 2022-05-18 RX ORDER — UBIDECARENONE 100 MG
CAPSULE ORAL
COMMUNITY
Start: 2021-04-20

## 2022-05-18 RX ORDER — INSULIN DEGLUDEC INJECTION 100 U/ML
INJECTION, SOLUTION SUBCUTANEOUS
Qty: 60 ML | Refills: 2 | Status: SHIPPED | OUTPATIENT
Start: 2022-05-18 | End: 2022-10-05 | Stop reason: SDUPTHER

## 2022-05-18 NOTE — PROGRESS NOTES
New Pt.  Discuss ablation    Denies chest pain, SOB, dizziness, palpitations    C/o swelling in the lower legs

## 2022-05-18 NOTE — PROGRESS NOTES
[address] (EP)  [facility]  Dept: [phone]  Cardiac Electrophysiology: Consultation Note  Patient's demographics:  Date:   5/18/2022  Patient name:              Ramiro Spear  YOB: 1960  Sex:    male   MRN:   [id number]    Primary Care Physician:  Kathleen Tate MD    Referring Physician:  Bing Wing MD    Reason for Consultation:  Supraventricular tachycardia. Clinical Summary:  60/M has history of intermittent palpitation for 2 years. Reports having at least 20 episodes so far. On 2/20/2021 he woke up from sleep with palpitations and shortness of breath. He was evaluated at [facility] ER and noted to be in narrow complex tachycardia which terminated with 6 mg of intravenous adenosine. His echocardiogram showed normal left atrial size and function. Cardiac catheterization showed mild coronary artery disease. He was started on small dose of metoprolol and symptomatic sinus bradycardia and as such the dose of metoprolol was reduced. He was referred here for EP study/possible ablation. The patient has no new complaints today. Denies chest pain, shortness of breath, syncope, lower extremity swelling. His episodes of palpitations will last for several minutes however can go 4 hours at times. This has significantly affect the quality of his life. Medical Hx: Narrow complex tachycardia, mild CAD, HTN, HPL, obesity and DM on insulin,     Review of systems:  Constitutional: Negative for chills and fever  HENT: Negative for congestion, sinus pressure, sneezing and sore throat. Eyes: Negative for pain, discharge, redness and itching. Respiratory: Negative for apnea, cough  Gastrointestinal: Negative for blood in stool, constipation, diarrhea   Endocrine: Negative for cold intolerance, heat intolerance, polydipsia. Genitourinary: Negative for dysuria, enuresis, flank pain and hematuria.    Musculoskeletal: Negative for arthralgias, joint swelling and neck pain. Neurological: Negative for numbness and headaches. Psychiatric/Behavioral: Negative for agitation, confusion, decreased concentration and dysphoric mood.       Past Medical History[de-identified]  Past Medical History:   Diagnosis Date    [facility]     Diabetes mellitus out of control ([facility]     Glucose intolerance (impaired glucose tolerance)     HTN (hypertension)     Hyperlipidemia        Past Surgical History:  Past Surgical History:   Procedure Laterality Date    APPENDECTOMY  2006    COLECTOMY  2017    Robotic sigmoid     COLONOSCOPY  2017    [facility] [place] COLONOSCOPY  2017    Dr. Margareth Moy 1 yr follow-up     KNEE ARTHROSCOPY  11       Right lateral meniscectomy    LIPOMA RESECTION  2007    forehead 2007 Dr Curry Velasquez      as a child       Family History:  Family History   Problem Relation Age of Onset    Diabetes Mother     High Blood Pressure Mother     Cancer Mother         breast, skin    Heart Disease Father     Stroke Father         Social History:  Social History     Socioeconomic History    Marital status:      Spouse name: Not on file    Number of children: Not on file    Years of education: Not on file    Highest education level: Not on file   Occupational History    Not on file   Tobacco Use    Smoking status: Former Smoker     Packs/day: 0.50     Years: 12.00     Pack years: 6.00     Types: Cigarettes     Start date: 1982     Quit date: 2005     Years since quittin.3    Smokeless tobacco: Never Used   Substance and Sexual Activity    Alcohol use: Yes     Comment: RARELY    Drug use: No    Sexual activity: Not on file   Other Topics Concern    Not on file   Social History Narrative    Not on file     Social Determinants of Health     Financial Resource Strain: Low Risk     Difficulty of Paying Living Expenses: Not hard at all   Food Insecurity: No Food Insecurity    Worried About [facility] in the Last Year: Never true    Gm of Food in the Last Year: Never true   Transportation Needs:     Lack of Transportation (Medical): Not on file    Lack of Transportation (Non-Medical): Not on file   Physical Activity:     Days of Exercise per Week: Not on file    Minutes of Exercise per Session: Not on file   Stress:     Feeling of Stress : Not on file   Social Connections:     Frequency of Communication with Friends and Family: Not on file    Frequency of Social Gatherings with Friends and Family: Not on file    Attends [employer] Services: Not on file    Active Member of [address] [place] [address] or Organizations: Not on file    Attends Club or Organization Meetings: Not on file    Marital Status: Not on file   Intimate Partner Violence:     Fear of Current or Ex-Partner: Not on file    Emotionally Abused: Not on file    Physically Abused: Not on file    Sexually Abused: Not on file   Housing Stability:     Unable to Pay for Housing in the Last Year: Not on file    Number of [place] in the Last Year: Not on file    Unstable Housing in the Last Year: Not on file        Allergies:   Allergies   Allergen Reactions    Zithromax [Azithromycin Dihydrate] Anaphylaxis     Throat swelling        Medications:  Current Outpatient Medications   Medication Sig Dispense Refill    TRESIBA FLEXTOUCH 100 UNIT/ML SOPN INJECT 30 UNITS INTO THE SKIN 2 TIMES DAILY 60 mL 2    amLODIPine (NORVASC) 5 MG tablet Amlodipine Active 5 MG PO Daily 30 February 22nd, 2022 4:38pm      metoprolol succinate (TOPROL XL) 25 MG extended release tablet 25 mg daily       SITagliptin (JANUVIA) 100 MG tablet TAKE 1 TABLET BY MOUTH EVERY DAY 90 tablet 1    glimepiride (AMARYL) 4 MG tablet Take 2 tablets by mouth every morning (before breakfast) 180 tablet 1    metFORMIN (GLUCOPHAGE) 1000 MG tablet Take 1 tablet by mouth 2 times daily (with meals) 180 tablet 1    lisinopril (PRINIVIL;ZESTRIL) 10 MG tablet 1 tablet daily (Patient taking differently: 10 mg 2 times daily ) 90 tablet 1    atorvastatin (LIPITOR) 40 MG tablet Take 1 tablet by mouth daily 90 tablet 1    Insulin Pen Needle (B-D UF III MINI PEN NEEDLES) 31G X 5 MM MISC BID Dx: E11.9 200 each 3    blood glucose test strips (ONETOUCH ULTRA) strip TEST TWICE A  strip 3    nystatin-triamcinolone (MYCOLOG II) 459865-3.1 UNIT/GM-% cream Apply topically 2 times daily. 60 g 5    SOFT TOUCH LANCETS MISC 1 Cartridge by Does not apply route 2 times daily 100 each 3    aspirin EC 81 MG EC tablet Take 1 tablet by mouth daily. 30 tablet 3    Krill Oil CAPS Take 1 tablet by mouth daily.  therapeutic multivitamin-minerals (THERAGRAN-M) tablet Take 1 tablet by mouth daily. No current facility-administered medications for this visit. Physical Examination:  Vitals:    05/18/22 1456   BP: (!) 152/73   Pulse: 51     Body mass index is 38.07 kg/m². GENERAL: Alert and oriented. No distress. EYES: No pallor or icterus. ENT: No cyanosis. No thyromegaly or cervical LAP. VESSELS: No jugular venous distension or carotid bruits. HEART: Normal S1/S2. No murmur, rub or gallop. LUNGS: Clear to auscultation. ABDOMEN: Soft and non-tender. EXTREMITIES: Trace lower extremity edema. Feet are warm.    NEUROLOGICAL: Grossly normal.     Laboratory And Diagnostic Data  I have personally reviewed and interpreted the results of the following diagnostic testing    Lab Results   Component Value Date    WBC 9.8 02/21/2022    HGB 16.6 (H) 02/21/2022    HCT 48.7 02/21/2022     02/21/2022    CHOL 116 04/17/2021    TRIG 176 (H) 04/17/2021    HDL 33 (L) 04/17/2021    LDLDIRECT 65 04/17/2021    ALT 33 04/17/2021    AST 25 04/17/2021     02/21/2022    K 5.4 (H) 02/21/2022     02/21/2022    CREATININE 1.02 02/21/2022    BUN 19 02/21/2022    CO2 29 02/21/2022    INR 1.00 02/21/2022    LABA1C 8.9 (H) 03/29/2022    LABMICR 8.8 11/19/2018  Echocardiogram 6/12/2019: LVEF 60%, LVWT 1.0 cm, LAD/JAQUELIN 47. No significant valvular abnormalities.  ECG Sinus rhythm with normal RI interval, QRS duration and QTc interval.    Coronary angiogram 4/20/2021: Non-obstructive CAD.  Event monitor 3/22/2022: Multiple episodes of narrow complex tachycardia (rates 168 BPM). Impression:  · Narrow complex tachycardia, adenosine responsive. · Symptomatic sinus bradycardia. · Mild CAD, HTN, HPL, obesity and DM on insulin,    Assessment And Recommendations:  Discussed management options of supraventricular tachycardia. Unfortunately, due to sinus bradycardia optimization of beta-blockers or initiation of flecainide is not feasible. I think he will be best served by EP study/SVT ablation as a curative option. The risks and benefits of EP study and SVT ablation were discussed in details. He wished to proceed. He can proceed with the colonoscopy he is scheduled for. The patient will be notified about the date and the time of the EP study and SVT ablations over the phone. His questions were answered. Thank you Chelsie Desai for allowing me to participate in the care of your patient. Please call me if you have any questions. **This report has been created using voice recognition software. It may contain minor errors which are inherent in voice recognition technology. **       Electronically signed by Joey Irizarry MD, MRCP, Zoran Thrasher on 5/18/2022 at 1:40 PM

## 2022-05-25 ENCOUNTER — TELEPHONE (OUTPATIENT)
Dept: CARDIOLOGY CLINIC | Age: 62
End: 2022-05-25

## 2022-05-25 NOTE — TELEPHONE ENCOUNTER
Procedure: EPS / SVT ablation    Meds to Hold: Metoprolol 3 days prior    Dr Regino Kuhn please see the meds,  Do you agree?

## 2022-05-26 NOTE — TELEPHONE ENCOUNTER
Procedure: EPS/ SVT ablation  Date: 07.07.2022  Arrival Time: 10am   Meds to Hold: Metoprolol 3 days prior,    Amaryl, metformin the mornign of the procedure, Cut insulin dose in half the night prior. Incision check -07.14.2022 at 330pm  1 mo follow up with Dr. Sue Roland on [phone] at 330pm    Instructions verbalized to the patient. Instructions mailed to the patient.

## 2022-05-26 NOTE — TELEPHONE ENCOUNTER
PRIOR AUTHORIZATION:    PENDING  #[id number]  Submitted clinical documentation (Ov notes Hakim, EKG)  CPT: [id number], [id number]  ICD: I47.1  DOS: 7/7/22

## 2022-05-29 DIAGNOSIS — E11.9 TYPE 2 DIABETES MELLITUS WITHOUT COMPLICATION, UNSPECIFIED WHETHER LONG TERM INSULIN USE (HCC): ICD-10-CM

## 2022-05-31 RX ORDER — BLOOD SUGAR DIAGNOSTIC
STRIP MISCELLANEOUS
Qty: 200 STRIP | Refills: 3 | Status: SHIPPED | OUTPATIENT
Start: 2022-05-31

## 2022-06-07 DIAGNOSIS — E11.9 TYPE 2 DIABETES MELLITUS WITHOUT COMPLICATION, UNSPECIFIED WHETHER LONG TERM INSULIN USE (HCC): ICD-10-CM

## 2022-06-30 ENCOUNTER — OFFICE VISIT (OUTPATIENT)
Dept: INTERNAL MEDICINE CLINIC | Age: 62
End: 2022-06-30
Payer: COMMERCIAL

## 2022-06-30 VITALS
SYSTOLIC BLOOD PRESSURE: 146 MMHG | DIASTOLIC BLOOD PRESSURE: 76 MMHG | BODY MASS INDEX: 37.05 KG/M2 | HEIGHT: 75 IN | WEIGHT: 298 LBS

## 2022-06-30 DIAGNOSIS — E11.9 TYPE 2 DIABETES MELLITUS WITHOUT COMPLICATION, UNSPECIFIED WHETHER LONG TERM INSULIN USE (HCC): Primary | ICD-10-CM

## 2022-06-30 DIAGNOSIS — R49.0 HOARSENESS: ICD-10-CM

## 2022-06-30 DIAGNOSIS — L84 CALLUS: ICD-10-CM

## 2022-06-30 LAB — HBA1C MFR BLD: 8.3 % (ref 4.3–5.7)

## 2022-06-30 PROCEDURE — 2022F DILAT RTA XM EVC RTNOPTHY: CPT | Performed by: NURSE PRACTITIONER

## 2022-06-30 PROCEDURE — 1036F TOBACCO NON-USER: CPT | Performed by: NURSE PRACTITIONER

## 2022-06-30 PROCEDURE — 3017F COLORECTAL CA SCREEN DOC REV: CPT | Performed by: NURSE PRACTITIONER

## 2022-06-30 PROCEDURE — 83036 HEMOGLOBIN GLYCOSYLATED A1C: CPT | Performed by: NURSE PRACTITIONER

## 2022-06-30 PROCEDURE — G8427 DOCREV CUR MEDS BY ELIG CLIN: HCPCS | Performed by: NURSE PRACTITIONER

## 2022-06-30 PROCEDURE — G8417 CALC BMI ABV UP PARAM F/U: HCPCS | Performed by: NURSE PRACTITIONER

## 2022-06-30 PROCEDURE — 3052F HG A1C>EQUAL 8.0%<EQUAL 9.0%: CPT | Performed by: NURSE PRACTITIONER

## 2022-06-30 PROCEDURE — 99214 OFFICE O/P EST MOD 30 MIN: CPT | Performed by: NURSE PRACTITIONER

## 2022-06-30 RX ORDER — ATORVASTATIN CALCIUM 40 MG/1
40 TABLET, FILM COATED ORAL DAILY
Qty: 90 TABLET | Refills: 1 | Status: SHIPPED | OUTPATIENT
Start: 2022-06-30

## 2022-06-30 ASSESSMENT — ENCOUNTER SYMPTOMS
SORE THROAT: 1
BLOOD IN STOOL: 0
WHEEZING: 0
TROUBLE SWALLOWING: 0
RHINORRHEA: 0
DIARRHEA: 0
PHOTOPHOBIA: 0
VOMITING: 0
SINUS PRESSURE: 0
VOICE CHANGE: 1
SHORTNESS OF BREATH: 0
CONSTIPATION: 0
COUGH: 0
SINUS PAIN: 0
NAUSEA: 0
ABDOMINAL PAIN: 0
ABDOMINAL DISTENTION: 0

## 2022-06-30 NOTE — PROGRESS NOTES
[facility]NAGEMENT  Dinesh Georges  Dept: [phone]  Dept Fax: [phone]: [phone]     Visit Date:  2022    Patient:  Ruma Cook  YOB: 1960    HPI:     Chief Complaint   Patient presents with    6 Month Follow-Up    Diabetes     Leonie Kolb (:  1960) is a 57 y. o. male, here for evaluation of the following medical concerns: diabetes, foot wound, hoarseness     I last seen Robbie 3 months ago. He follows routinely with Dr. Reyna Antony     Heart catheterization with Dr. Janice Frederick 1 year, 50% coronary artery stenosis. No stents needed. He has had some episodes of SVT. One requiring hospitalization at [facility]. Planning an ablation  with Dr. Roque Jay     Diabetes-  Diagnosed 7-8 years ago. He states his diabetes is not well controlled.  A1C 8.3% today, was 8.9% previously. Is on metformin 1000 mg BID, glimepiride 4 mg BID, sitagliptin 100 mg daily, and Tresiba 50 units BID. He did not tolerate Invokana, caused increased urination. States that he misses oral medications occasionally. Is on lisinopril 20 mg daily, with last urinary microalbumin/crt Darek Saenz 2020. States hypoglycemic events not present. Is able to voice signs/symptoms of hypoglycemia as dizziness and diaphoresis.  Reports checking glucose 1 time per day, with range of glucose readings 109-158 per meter download. Attempting dietary modification for diabetes management and/or weight loss. Currently follows with diabetes clinic, dietician, CDE. Does report difficulty with obtaining medications. Last eye exam since last visit with Dr. Jose Armando Forbes, no retinopathy. BP reccs <140/90 (<130/80 in CVD risk). On aspirin 81 mg daily and atorvastatin 40 mg daily.      Denies polyuria, polydipsia, polyphagia. Reports occasionally neuropathic symptoms including numbness, tingling.  Does not have any wounds to feet.        Most recent labs in 2/2022, revealed a calcium of 9.20. He denies any bone fragility. No fractures. Ionized calcium 1.23 in 12/2020. He is on calcium 600 mg daily.      Is following with urology for urinary issues and ED. Complains of hoarseness, voice change for the passed 3-4 months. Does not smoke currently, has a 12 year 0.5 PPD smoke history. TSH WNL. He also has a right metatarsal wound/callus. Refer to podiatry for evaluation.      Medications    Current Outpatient Medications:     atorvastatin (LIPITOR) 40 MG tablet, Take 1 tablet by mouth daily, Disp: 90 tablet, Rfl: 1    SITagliptin (JANUVIA) 100 MG tablet, TAKE 1 TABLET BY MOUTH EVERY DAY, Disp: 90 tablet, Rfl: 1    blood glucose test strips (ONETOUCH ULTRA) strip, USE TO TEST TWICE A DAY, Disp: 200 strip, Rfl: 3    TRESIBA FLEXTOUCH 100 UNIT/ML SOPN, INJECT 30 UNITS INTO THE SKIN 2 TIMES DAILY (Patient taking differently: 50 Units in the morning and at bedtime ), Disp: 60 mL, Rfl: 2    Garlic Oil 3186 MG CAPS, Garlic Active 7357 MG PO Daily April 20th, 2021 12:23pm, Disp: , Rfl:     coenzyme Q10 100 MG CAPS capsule, Coenzyme Q10 (Coq-10) 100 mg Capsule Active 100 MG PO Daily April 20th, 2021 12:23pm, Disp: , Rfl:     Bilberry, Vaccinium myrtillus, 80 MG CAPS, Bilberry Fruit Extract Active 160 MG PO Daily February 21st, 2022 10:24am, Disp: , Rfl:     amLODIPine (NORVASC) 5 MG tablet, Amlodipine Active 5 MG PO Daily 30 February 22nd, 2022 4:38pm, Disp: , Rfl:     metoprolol succinate (TOPROL XL) 25 MG extended release tablet, 12.5 mg daily , Disp: , Rfl:     glimepiride (AMARYL) 4 MG tablet, Take 2 tablets by mouth every morning (before breakfast), Disp: 180 tablet, Rfl: 1    metFORMIN (GLUCOPHAGE) 1000 MG tablet, Take 1 tablet by mouth 2 times daily (with meals), Disp: 180 tablet, Rfl: 1    lisinopril (PRINIVIL;ZESTRIL) 10 MG tablet, 1 tablet daily (Patient taking differently: 10 mg 2 times daily ), Disp: 90 tablet, Rfl: 1   Insulin Pen Needle (B-D UF III MINI PEN NEEDLES) 31G X 5 MM MISC, BID Dx: E11.9, Disp: 200 each, Rfl: 3    nystatin-triamcinolone (MYCOLOG II) 243697-1.7 UNIT/GM-% cream, Apply topically 2 times daily. , Disp: 60 g, Rfl: 5    SOFT TOUCH LANCETS MISC, 1 Cartridge by Does not apply route 2 times daily, Disp: 100 each, Rfl: 3    aspirin EC 81 MG EC tablet, Take 1 tablet by mouth daily. , Disp: 30 tablet, Rfl: 3    Krill Oil CAPS, Take 1 tablet by mouth daily. , Disp: , Rfl:     therapeutic multivitamin-minerals (THERAGRAN-M) tablet, Take 1 tablet by mouth daily. , Disp: , Rfl:     The patient is allergic to zithromax [azithromycin dihydrate]. Past Medical History  Milton Chong  has a past medical history of Diabetes mellitus ([address] Diabetes mellitus out of control ([facility] [address] Glucose intolerance (impaired glucose tolerance), HTN (hypertension), and Hyperlipidemia. Past Surgical History  The patient  has a past surgical history that includes lipoma resection (2007); Appendectomy (2006); Knee arthroscopy (12/7/11); Tonsillectomy; Colonoscopy (04/28/2017); colectomy (05/23/2017); and Colonoscopy (11/2017). Family History  This patient's family history includes Cancer in his mother; Diabetes in his mother; Heart Disease in his father; High Blood Pressure in his mother; Stroke in his father. Social History  Milton Chong  reports that he quit smoking about 17 years ago. His smoking use included cigarettes. He started smoking about 40 years ago. He has a 6.00 pack-year smoking history. He has never used smokeless tobacco. He reports current alcohol use. He reports that he does not use drugs.     Health Maintenance:    Health Maintenance   Topic Date Due    COVID-19 Vaccine (1) Never done    Pneumococcal 0-64 years Vaccine (1 - PCV) Never done    HIV screen  Never done    DTaP/Tdap/Td vaccine (1 - Tdap) Never done    Shingles vaccine (1 of 2) Never done    Diabetic foot exam  12/06/2019    Diabetic retinal exam  12/06/2020  Diabetic microalbuminuria test  12/29/2021    Prostate Specific Antigen (PSA) Screening or Monitoring  12/29/2021    Lipids  04/17/2022    Depression Screen  06/29/2022    Flu vaccine (Season Ended) 09/01/2022    A1C test (Diabetic or Prediabetic)  06/30/2023    Colorectal Cancer Screen  01/06/2030    Hepatitis C screen  Completed    Hepatitis A vaccine  Aged Out    Hib vaccine  Aged Out    Meningococcal (ACWY) vaccine  Aged Out       Subjective:      Review of Systems   Constitutional: Negative for chills, fatigue and fever. HENT: Positive for sore throat and voice change. Negative for congestion, ear pain, rhinorrhea, sinus pressure, sinus pain, tinnitus and trouble swallowing. Eyes: Negative for photophobia and visual disturbance. Respiratory: Negative for cough, shortness of breath and wheezing. Cardiovascular: Negative for chest pain, palpitations and leg swelling. Gastrointestinal: Negative for abdominal distention, abdominal pain, blood in stool, constipation, diarrhea, nausea and vomiting. Endocrine: Positive for polyuria. Negative for polydipsia and polyphagia. Genitourinary: Negative for difficulty urinating, dysuria, frequency, hematuria and urgency. Musculoskeletal: Negative for myalgias. Skin: Positive for wound (foot). Negative for rash. Neurological: Negative for dizziness, light-headedness and headaches. Psychiatric/Behavioral: Negative for dysphoric mood and sleep disturbance. The patient is not nervous/anxious. Objective:     BP (!) 146/76 (Site: Left Upper Arm, Position: Sitting)   Ht 6' 3\" (1.905 m)   Wt 298 lb (135.2 kg)   BMI 37.25 kg/m²     Physical Exam  Constitutional:       General: He is not in acute distress. Appearance: Normal appearance. He is normal weight. He is not ill-appearing. HENT:      Head: Normocephalic and atraumatic.       Right Ear: Tympanic membrane, ear canal and external ear normal.      Left Ear: Tympanic membrane, ear canal and external ear normal.      Nose: Nose normal. No congestion or rhinorrhea. Mouth/Throat:      Mouth: Mucous membranes are moist.      Pharynx: Oropharynx is clear. No oropharyngeal exudate or posterior oropharyngeal erythema. Eyes:      Extraocular Movements: Extraocular movements intact. Conjunctiva/sclera: Conjunctivae normal.      Pupils: Pupils are equal, round, and reactive to light. Neck:      Vascular: No carotid bruit. Cardiovascular:      Rate and Rhythm: Normal rate and regular rhythm. Pulses: Normal pulses. Heart sounds: No murmur heard. No friction rub. No gallop. Pulmonary:      Effort: Pulmonary effort is normal. No respiratory distress. Breath sounds: Normal breath sounds. No wheezing, rhonchi or rales. Abdominal:      General: Abdomen is flat. Bowel sounds are normal. There is no distension. Palpations: Abdomen is soft. Tenderness: There is no abdominal tenderness. Musculoskeletal:         General: No swelling or tenderness. Normal range of motion. Cervical back: Normal range of motion and neck supple. No muscular tenderness. Right lower leg: Edema (trace) present. Left lower leg: Edema (trace) present. Lymphadenopathy:      Cervical: No cervical adenopathy. Skin:     General: Skin is warm and dry. Capillary Refill: Capillary refill takes less than 2 seconds. Findings: Lesion (right foot) present. No erythema or rash. Neurological:      General: No focal deficit present. Mental Status: He is alert and oriented to person, place, and time. Motor: No weakness. Coordination: Coordination normal.      Deep Tendon Reflexes: Reflexes normal.   Psychiatric:         Mood and Affect: Mood normal.         Behavior: Behavior normal.         Thought Content:  Thought content normal.         Judgment: Judgment normal.         Labs Reviewed 6/30/2022:    Lab Results   Component Value Date    WBC 9.8 02/21/2022 HGB 16.6 (H) 02/21/2022    HCT 48.7 02/21/2022     02/21/2022    CHOL 116 04/17/2021    TRIG 176 (H) 04/17/2021    HDL 33 (L) 04/17/2021    LDLDIRECT 65 04/17/2021    ALT 33 04/17/2021    AST 25 04/17/2021     02/21/2022    K 5.4 (H) 02/21/2022     02/21/2022    CREATININE 1.02 02/21/2022    BUN 19 02/21/2022    CO2 29 02/21/2022    PSA 0.35 11/19/2018    INR 1.00 02/21/2022    LABA1C 8.3 (H) 06/30/2022    LABMICR 8.8 11/19/2018       Assessment/Plan      1. Type 2 diabetes mellitus without complication, unspecified whether long term insulin use (HCC)    - POCT glycosylated hemoglobin (Hb A1C)  - atorvastatin (LIPITOR) 40 MG tablet; Take 1 tablet by mouth daily  Dispense: 90 tablet; Refill: 1  - Comprehensive Metabolic Panel; Future  - CBC; Future  - Lipid Panel; Future  - Lesta Hunger and Aldean Julian are not affordable. Once Januvia is gone, he will stop it. Patient is to call insurance company and see what basal insulin is most affordable and let office know  - Discussed GLP1, SGLT2, and/or addition of basal insulin. Also discussed CGM. Patient will consider all options, does not wish to start any today. - Encouraged dietary modifications and exercise regimen to promote optimal glycemic control    2. Hoarseness    - [facility]    3. Callus    - External Referral To Podiatry      Return in about 3 months (around 9/30/2022). Patient given educational materials - see patient instructions. Discussed use, benefit, and side effects of prescribed medications. All patient questions answered. Pt voiced understanding. Reviewed health maintenance.        Electronically signed LIZZ Norton - CNP on 6/30/22 at 8:18 AM EDT

## 2022-07-05 NOTE — PRE-PROCEDURE INSTRUCTIONS
[place]re on 2nd floor [facility]  NPO after midnight  Bring drivers license and insurance information  Wear comfortable clean clothes  Shower morning of and night before with liquid antibacterial soap  Remove jewelry   Bring medications in original bottles - may take am meds with small amount of water. Do not take any diabetic meds day of procedure. Made aware of visitors limit to 2 at a time  Follow all instructions given by your physician  Please notify doctor office if you need to cancel or reschedule your procedure   needed at discharge  Bring and wear your mask.   If you use CPap or BiPap for sleep apnea, please bring machine with you  Leave valuables at home

## 2022-07-06 ENCOUNTER — PREP FOR PROCEDURE (OUTPATIENT)
Dept: CARDIOLOGY | Age: 62
End: 2022-07-06

## 2022-07-06 RX ORDER — SODIUM CHLORIDE 0.9 % (FLUSH) 0.9 %
5-40 SYRINGE (ML) INJECTION PRN
Status: CANCELLED | OUTPATIENT
Start: 2022-07-06

## 2022-07-06 RX ORDER — SODIUM CHLORIDE 0.9 % (FLUSH) 0.9 %
5-40 SYRINGE (ML) INJECTION EVERY 12 HOURS SCHEDULED
Status: CANCELLED | OUTPATIENT
Start: 2022-07-06

## 2022-07-06 RX ORDER — SODIUM CHLORIDE 9 MG/ML
INJECTION, SOLUTION INTRAVENOUS PRN
Status: CANCELLED | OUTPATIENT
Start: 2022-07-06

## 2022-07-07 ENCOUNTER — HOSPITAL ENCOUNTER (OUTPATIENT)
Dept: INPATIENT UNIT | Age: 62
Discharge: HOME | End: 2022-07-07
Attending: INTERNAL MEDICINE | Admitting: INTERNAL MEDICINE
Payer: COMMERCIAL

## 2022-07-07 ENCOUNTER — ANESTHESIA (OUTPATIENT)
Dept: OPERATING ROOM | Age: 62
End: 2022-07-07
Payer: COMMERCIAL

## 2022-07-07 ENCOUNTER — ANESTHESIA EVENT (OUTPATIENT)
Dept: OPERATING ROOM | Age: 62
End: 2022-07-07
Payer: COMMERCIAL

## 2022-07-07 VITALS
BODY MASS INDEX: 38.24 KG/M2 | SYSTOLIC BLOOD PRESSURE: 149 MMHG | OXYGEN SATURATION: 95 % | HEIGHT: 74 IN | RESPIRATION RATE: 18 BRPM | DIASTOLIC BLOOD PRESSURE: 73 MMHG | WEIGHT: 298 LBS | HEART RATE: 65 BPM | TEMPERATURE: 97.8 F

## 2022-07-07 LAB
ABO: NORMAL
ANION GAP SERPL CALCULATED.3IONS-SCNC: 12 MEQ/L (ref 8–16)
ANTIBODY SCREEN: NORMAL
APTT: 28.1 SECONDS (ref 22–38)
BUN BLDV-MCNC: 18 MG/DL (ref 7–22)
CALCIUM SERPL-MCNC: 9 MG/DL (ref 8.5–10.5)
CHLORIDE BLD-SCNC: 106 MEQ/L (ref 98–111)
CO2: 25 MEQ/L (ref 23–33)
CREAT SERPL-MCNC: 0.9 MG/DL (ref 0.4–1.2)
EKG ATRIAL RATE: 57 BPM
EKG ATRIAL RATE: 59 BPM
EKG P AXIS: 27 DEGREES
EKG P AXIS: 83 DEGREES
EKG P-R INTERVAL: 196 MS
EKG P-R INTERVAL: 198 MS
EKG Q-T INTERVAL: 416 MS
EKG Q-T INTERVAL: 440 MS
EKG QRS DURATION: 100 MS
EKG QRS DURATION: 96 MS
EKG QTC CALCULATION (BAZETT): 411 MS
EKG QTC CALCULATION (BAZETT): 428 MS
EKG R AXIS: -37 DEGREES
EKG R AXIS: -47 DEGREES
EKG T AXIS: -20 DEGREES
EKG T AXIS: -3 DEGREES
EKG VENTRICULAR RATE: 57 BPM
EKG VENTRICULAR RATE: 59 BPM
ERYTHROCYTE [DISTWIDTH] IN BLOOD BY AUTOMATED COUNT: 12.8 % (ref 11.5–14.5)
ERYTHROCYTE [DISTWIDTH] IN BLOOD BY AUTOMATED COUNT: 40.2 FL (ref 35–45)
GFR SERPL CREATININE-BSD FRML MDRD: 86 ML/MIN/1.73M2
GLUCOSE BLD-MCNC: 225 MG/DL (ref 70–108)
HCT VFR BLD CALC: 42.7 % (ref 42–52)
HEMOGLOBIN: 14.5 GM/DL (ref 14–18)
INR BLD: 0.97 (ref 0.85–1.13)
MCH RBC QN AUTO: 29.8 PG (ref 26–33)
MCHC RBC AUTO-ENTMCNC: 34 GM/DL (ref 32.2–35.5)
MCV RBC AUTO: 87.7 FL (ref 80–94)
PLATELET # BLD: 166 THOU/MM3 (ref 130–400)
PMV BLD AUTO: 10.6 FL (ref 9.4–12.4)
POTASSIUM SERPL-SCNC: 4.4 MEQ/L (ref 3.5–5.2)
RBC # BLD: 4.87 MILL/MM3 (ref 4.7–6.1)
RH FACTOR: NORMAL
SODIUM BLD-SCNC: 143 MEQ/L (ref 135–145)
WBC # BLD: 6.6 THOU/MM3 (ref 4.8–10.8)

## 2022-07-07 PROCEDURE — 2580000003 HC RX 258: Performed by: PHYSICIAN ASSISTANT

## 2022-07-07 PROCEDURE — 2709999900 HC NON-CHARGEABLE SUPPLY

## 2022-07-07 PROCEDURE — C1894 INTRO/SHEATH, NON-LASER: HCPCS

## 2022-07-07 PROCEDURE — 2500000003 HC RX 250 WO HCPCS

## 2022-07-07 PROCEDURE — 80048 BASIC METABOLIC PNL TOTAL CA: CPT

## 2022-07-07 PROCEDURE — 93653 COMPRE EP EVAL TX SVT: CPT

## 2022-07-07 PROCEDURE — 93622 COMP EP EVAL L VENTR PAC&REC: CPT

## 2022-07-07 PROCEDURE — 86850 RBC ANTIBODY SCREEN: CPT

## 2022-07-07 PROCEDURE — 93005 ELECTROCARDIOGRAM TRACING: CPT | Performed by: PHYSICIAN ASSISTANT

## 2022-07-07 PROCEDURE — 93005 ELECTROCARDIOGRAM TRACING: CPT | Performed by: INTERNAL MEDICINE

## 2022-07-07 PROCEDURE — 86923 COMPATIBILITY TEST ELECTRIC: CPT

## 2022-07-07 PROCEDURE — 93010 ELECTROCARDIOGRAM REPORT: CPT | Performed by: INTERNAL MEDICINE

## 2022-07-07 PROCEDURE — 86901 BLOOD TYPING SEROLOGIC RH(D): CPT

## 2022-07-07 PROCEDURE — 86900 BLOOD TYPING SEROLOGIC ABO: CPT

## 2022-07-07 PROCEDURE — 85610 PROTHROMBIN TIME: CPT

## 2022-07-07 PROCEDURE — 85027 COMPLETE CBC AUTOMATED: CPT

## 2022-07-07 PROCEDURE — 93653 COMPRE EP EVAL TX SVT: CPT | Performed by: INTERNAL MEDICINE

## 2022-07-07 PROCEDURE — C1766 INTRO/SHEATH,STRBLE,NON-PEEL: HCPCS

## 2022-07-07 PROCEDURE — 36415 COLL VENOUS BLD VENIPUNCTURE: CPT

## 2022-07-07 PROCEDURE — 93623 PRGRMD STIMJ&PACG IV RX NFS: CPT | Performed by: INTERNAL MEDICINE

## 2022-07-07 PROCEDURE — 85730 THROMBOPLASTIN TIME PARTIAL: CPT

## 2022-07-07 PROCEDURE — C1730 CATH, EP, 19 OR FEW ELECT: HCPCS

## 2022-07-07 PROCEDURE — C1732 CATH, EP, DIAG/ABL, 3D/VECT: HCPCS

## 2022-07-07 PROCEDURE — 6360000002 HC RX W HCPCS

## 2022-07-07 RX ORDER — SODIUM CHLORIDE 0.9 % (FLUSH) 0.9 %
5-40 SYRINGE (ML) INJECTION PRN
Status: DISCONTINUED | OUTPATIENT
Start: 2022-07-07 | End: 2022-07-07 | Stop reason: HOSPADM

## 2022-07-07 RX ORDER — SODIUM CHLORIDE 9 MG/ML
INJECTION, SOLUTION INTRAVENOUS PRN
Status: DISCONTINUED | OUTPATIENT
Start: 2022-07-07 | End: 2022-07-07 | Stop reason: HOSPADM

## 2022-07-07 RX ORDER — SODIUM CHLORIDE 0.9 % (FLUSH) 0.9 %
5-40 SYRINGE (ML) INJECTION EVERY 12 HOURS SCHEDULED
Status: DISCONTINUED | OUTPATIENT
Start: 2022-07-07 | End: 2022-07-07 | Stop reason: HOSPADM

## 2022-07-07 RX ADMIN — SODIUM CHLORIDE, PRESERVATIVE FREE 10 ML: 5 INJECTION INTRAVENOUS at 11:09

## 2022-07-07 RX ADMIN — SODIUM CHLORIDE: 9 INJECTION, SOLUTION INTRAVENOUS at 11:09

## 2022-07-07 ASSESSMENT — LIFESTYLE VARIABLES
HOW OFTEN DO YOU HAVE A DRINK CONTAINING ALCOHOL: NEVER
HOW MANY STANDARD DRINKS CONTAINING ALCOHOL DO YOU HAVE ON A TYPICAL DAY: 1 OR 2

## 2022-07-07 NOTE — H&P
[facility] ()  [address] [place] [id number]  H&P and SEDATION/ANALGESIA     Patient demographics:  Date:   7/7/2022  Patient name: Charleen Mercer  YOB: 1960  Sex: male   MRN:   [id number]    Reason for admission or planned procedure:  EP study/SVT ablation. Code Status: Full Code    Consent:The risk and benefits of EP study and arrhythmia catheter ablation including bleeding, vascular complications, pericardial tamponade requiring emergency pericardiocentesis, AV node injury requiring permanent pacemaker implantation, potential radiation exposure and recurrent arrhythmia requiring repeat ablation or initiation of medications were discussed as well. He verbalized understanding of the discussion. His questions were answered. The patient wishes to proceed. Brief clinical summary:  62/M with adenosibe sensitive narrow complex tachycardia, refractory to beta-blocker presents electively for EP study and SVT ablation. Normal LVEF. Medical Hx: Narrow complex tachycardia, mild CAD, HTN, HPL, obesity and DM on insulin,      Past Medical History:  Past Medical History:   Diagnosis Date    Diabetes mellitus ([address]     Diabetes mellitus out of control ([address]     Glucose intolerance (impaired glucose tolerance)     HTN (hypertension)     Hyperlipidemia        Past Surgical History:  Past Surgical History:   Procedure Laterality Date    APPENDECTOMY  2006    COLECTOMY  05/23/2017    Robotic sigmoid     COLONOSCOPY  04/28/2017    Meri Alvares COLONOSCOPY  11/2017    Dr. Kim Ko 1 yr follow-up     KNEE ARTHROSCOPY  12/7/11       Right lateral meniscectomy    LIPOMA RESECTION  2007    forehead 2007 Dr Clari North      as a child        Allergies:    Allergies as of 07/07/2022 - Fully Reviewed 07/07/2022   Allergen Reaction Noted    Zithromax [azithromycin dihydrate] Anaphylaxis 08/04/2011     Additional information:       Medications     Current Facility-Administered Medications:     0.9 % sodium chloride infusion, , IntraVENous, PRN, Jessica Mack PA-C, Last Rate: 75 mL/hr at 07/07/22 1109, New Bag at 07/07/22 1109    sodium chloride flush 0.9 % injection 5-40 mL, 5-40 mL, IntraVENous, 2 times per day, Jessica Mack PA-C, 10 mL at 07/07/22 1109    sodium chloride flush 0.9 % injection 5-40 mL, 5-40 mL, IntraVENous, PRN, Jessica Mack PA-C  Prior to Admission medications    Medication Sig Start Date End Date Taking?  Authorizing Provider   atorvastatin (LIPITOR) 40 MG tablet Take 1 tablet by mouth daily 6/30/22   LIZZ Castillo CNP   SITagliptin (JANUVIA) 100 MG tablet TAKE 1 TABLET BY MOUTH EVERY DAY 6/7/22   LIZZ Castillo CNP   blood glucose test strips (ONETOUCH ULTRA) strip USE TO TEST TWICE A DAY 5/31/22   LIZZ Castillo CNP   TRESIBA FLEXTOUCH 100 UNIT/ML SOPN INJECT 30 UNITS INTO THE SKIN 2 TIMES DAILY  Patient taking differently: 50 Units in the morning and at bedtime  5/18/22   LIZZ Castillo CNP   Garlic Oil 8424 MG CAPS Garlic Active 4906 MG PO Daily April 20th, 2021 12:23pm 4/20/21   Historical Provider, MD   coenzyme Q10 100 MG CAPS capsule Coenzyme Q10 (Coq-10) 100 mg Capsule Active 100 MG PO Daily April 20th, 2021 12:23pm 4/20/21   Historical Provider, MD Thiago Fallon, Vaccinium myrtillus, 80 MG CAPS Bilberry Fruit Extract Active 160 MG PO Daily February 21st, 2022 10:24am 4/20/21   Historical Provider, MD   amLODIPine (NORVASC) 5 MG tablet Amlodipine Active 5 MG PO Daily 30 February 22nd, 2022 4:38pm 2/22/22   Historical Provider, MD   metoprolol succinate (TOPROL XL) 25 MG extended release tablet 12.5 mg daily  3/17/22   Historical Provider, MD   glimepiride (AMARYL) 4 MG tablet Take 2 tablets by mouth every morning (before breakfast) 9/29/21   Serge Homer, APRN - CNP   metFORMIN (GLUCOPHAGE) 1000 MG tablet Take 1 tablet by mouth 2 times daily (with meals) 9/29/21   LIZZ Lane CNP   lisinopril (PRINIVIL;ZESTRIL) 10 MG tablet 1 tablet daily  Patient taking differently: 10 mg 2 times daily  9/29/21   LIZZ Lane CNP   Insulin Pen Needle (B-D UF III MINI PEN NEEDLES) 31G X 5 MM MISC BID Dx: E11.9 6/29/21   LIZZ Lane CNP   nystatin-triamcinolone (MYCOLOG II) 272744-2.5 UNIT/GM-% cream Apply topically 2 times daily. 6/4/18   Issa Abdalla MD   SOFT TOUCH LANCETS MISC 1 Cartridge by Does not apply route 2 times daily 5/12/16   Issa Abdalla MD   aspirin EC 81 MG EC tablet Take 1 tablet by mouth daily. 8/20/13   Issa Abdalla MD   [place] Smoker CAPS Take 1 tablet by mouth daily. Historical Provider, MD   therapeutic multivitamin-minerals (THERAGRAN-M) tablet Take 1 tablet by mouth daily. Historical Provider, MD     Aspirin  [x] 81 mg  [] 325 mg  [] None  Antiplatelet drug therapy use last 5 days  [x]No []Yes  Coumadin Use Last 5 Days [x]No []Yes  Other anticoagulant use last 5 days  [x]No []Yes  Additional information: Per medical records       Vitals:   Vitals:    07/07/22 1040   BP:    Pulse: 61   Resp:    Temp:    SpO2:        Physical Examination:   GENERAL: Alert and oriented. No distress. EYES: No pallor or icterus. ENT: No central cyanosis. VESSELS: No jugular venous distension or carotid bruits. HEART: Normal S1/S2. No murmur, rub or gallop. LUNGS: Clear to auscultation. ABDOMEN: Soft and non-tender. EXTREMITIES: No lower extremity edema. Feet are warm. NEUROLOGICAL: Grossly intact. Procedure Plannd:   [] AF ablation [] Atrial Flutter ablation [x] SVT ablation [] PVC/VT ablation [x] EP study  [] Pacemaker implantation [] AICD implantation [] BiV PM/ICD implantation   [] Generator change [] Loop recorder implantation [] Loop recorder explantation. [] Micra leadless pacemaker implantation. [] His bundle/Left bundle pacemaker implantation. [] Lead revision.  [] Pocket Revision. [] MOE. [] Cardioversion    []Other:       Planned Sedation/Analgesia:  [] General anesthesia. [x] Midazolam [x] Fentanyl [] Propofol [] Propofol with anesthesia team.    []Midazolam []Meperidine []Sublimaze []Morphine [] Diazepam    []Other:       ASA Classification  []1 []2 [x]3 []4 []5  Class 1: A normal healthy patient  Class 2: Pt with mild to moderate systemic disease  Class 3: Severe systemic disease or disturbance  Class 4: Severe systemic disorders that are already life threatening. Class 5: Moribund pt with little chances of survival, for more than 24 hours. Mallampati Airway Classification:   []1 []2 [x]3 []4    [x]Pre-procedure diagnostic studies complete and results available. Comment:    [x]Previous sedation/anesthesia experiences assessed. Comment:    [x]The patient is an appropriate candidate to undergo the planned procedure sedation and anesthesia. (Refer to nursing sedation/analgesia documentation record)  [x]Formulation and discussion of sedation/procedure plan, risks, and expectations with patient and/or responsible adult completed. [x]Patient examined immediately prior to the procedure.  (Refer to nursing sedation/analgesia documentation record)        Shweta Gupta MD MD [facility]  Electronically signed 7/7/2022 at 11:38 AM

## 2022-07-07 NOTE — PROCEDURES
[facility] ([facility]  Dept: [phone]  CARDIAC ELECTROPHYSIOLOGY: PROCEDURE NOTE  Patient Demographics:  Date:   7/7/2022  Patient name:              Yusef Ceballos  YOB: 1960  Sex: male   MRN:   [id number]    Cardiac Electrophysiologist:  Rolly Mcfarland MD, [facility], [facility], [facility]     Primary Care Provider:    Atif Ramírez MD     Cardiologist:   Brian Bob MD    Procedure Planned:  Electrophysiology study and supraventricular tachycardia (SVT) ablation. Preoperative Diagnosis:  Narrow complex tachycardia. Postoperative Diagnosis:  Typical AV emil reentrant tachycardia. Brief Clinical Summary:  62/M with adenosibe sensitive narrow complex tachycardia, refractory to beta-blocker presents electively for EP study and SVT ablation. Normal LVEF. Medical Hx: Narrow complex tachycardia, mild CAD, HTN, HPL, obesity and DM on insulin,       Procedure Performed:   Comprehensive electrophysiology study with isoproterenol.  3D electro anatomical mapping (CARTO Copperfasten).  Induction and mapping of tachycardia.  Slow pathway ablation. Description of the procedure: The patient was brought to the electrophysiology lab in a fasting and non-sedated status. He was prepped and draped in the usual sterile fashion. Intravenous Fentanyl and Midazolam as needed was used for analgesia and conscious sedation. Lidocaine, 2% was injected into femoral regions and right side of the neck for local anesthesia. Vascular access was obtained under ultrasound guidance using 21G micropuncture needle and hemostatic short sheaths were placed over J-tip guidewires (9 and 7-French right femoral vein, 7-French left femoral vein, and 8-French in right internal jugular vein). Right atrial and coronary sinus fast anatomical map was created using 3.5 mm open irrigated catheter (STSF D/F curve).  The diagnostic catheters were through AV emil fast pathway. Post ablation sinus cycle length was 713 ms, MT interval 180 ms, QRS duration 101 ms and QT interval 423 ms. Post-ablation, AV emil Wenckebach cycle length was 320 ms, and AV emil ERP was 290/500 ms. At the end of the procedure all catheters and sheaths were removed. Hemostasis was achieved by manual compression and the vascular access sites were covered with light sterile dressing. Ablation  Summary:  Total number of RF lesions 8 and otal RF time 2.26 minutes, maximum energy used 40 [place], mean catheter tip temperature 32 degree C. Medications   Midazolam 2 mg intravenously.  Fentanyl 150 mcg intravenously.  Isoproterenol 10 mcg per minute IV infusion.  Lidocaine 2%, 10 cc SQ.   Hydralazine 10 mg intravenously. Fluoroscopic Time:  Zero      Bood Loss:  Minimal.    Fluid Balance:  600/0 cc. Complications:  None. Summary:   Typical (slow-fast) AV emil reentrant tachycardia.  Successful slow pathway ablation (no residual jump).  Normal post ablation AV emil function.  Normal post ablation His-Purkinje function. Recommendations:   Aspirin 81 mg daily.  Discontinue metoprolol   Discharge and post-operative care per protocol.       Electronically signed by Jonnie Dias MD, Lokesh Sweet on 7/7/2022 at 3:33 PM

## 2022-07-07 NOTE — BRIEF OP NOTE
Brief Postoperative Note    Date:   7/7/2022  Patient name: Nahid Oshea  YOB: 1960  Sex: male   MRN:   [id number]    PCP: Eva Stanley MD     Procedure: EP study and SVT ablation. Pre-Op Diagnosis: SVT    Post-Op Diagnosis: AVNRT    Surgeon: Sera Menon MD, [facility], [facility]    Assistant: [facility]. Anesthesia/sedation:  Local lidocaine/fentanyl and midazolam as needed. Estimated Blood Loss (mL): Minimal    Complications: None    Recommendations:   See orders in Epic.  Bed rest for 2 hours.  Watch access site/s for bleeding or swelling.  Hold pressure if bleeding or swelling.  Ambulate 2 hour after suture/sheath removal.   Remove Barksdale's catheter (if in place) once patient ambulates.  Stop IV fluids once patient starts eating.  Resume home medications as tonight.  Follow up in 4 weeks in EP clinic.              Electronically signed by Sravanthi Bach MD, Thiago Ny on 7/7/2022 at 3:27 PM

## 2022-07-07 NOTE — FLOWSHEET NOTE
Discharged home in stable condition. Patient and spouse verbalize understanding of discharge instructions and follow-up.

## 2022-07-08 ENCOUNTER — TELEPHONE (OUTPATIENT)
Dept: CARDIOLOGY CLINIC | Age: 62
End: 2022-07-08

## 2022-07-08 NOTE — TELEPHONE ENCOUNTER
POD #1 EP/SVT ablation. Wife states he's in shower, puncture sites are sore for him. [id number] [address] for [facility]. No drainage. Aware to call if any issues.   Verbalized understanding

## 2022-07-12 ENCOUNTER — OFFICE VISIT (OUTPATIENT)
Dept: UROLOGY | Age: 62
End: 2022-07-12
Payer: COMMERCIAL

## 2022-07-12 VITALS
WEIGHT: 292.3 LBS | DIASTOLIC BLOOD PRESSURE: 80 MMHG | BODY MASS INDEX: 37.51 KG/M2 | HEIGHT: 74 IN | SYSTOLIC BLOOD PRESSURE: 120 MMHG

## 2022-07-12 DIAGNOSIS — N50.811 RIGHT TESTICULAR PAIN: ICD-10-CM

## 2022-07-12 DIAGNOSIS — N52.9 ERECTILE DYSFUNCTION, UNSPECIFIED ERECTILE DYSFUNCTION TYPE: Primary | ICD-10-CM

## 2022-07-12 DIAGNOSIS — R35.0 URINARY FREQUENCY: ICD-10-CM

## 2022-07-12 PROCEDURE — 3017F COLORECTAL CA SCREEN DOC REV: CPT | Performed by: UROLOGY

## 2022-07-12 PROCEDURE — G8417 CALC BMI ABV UP PARAM F/U: HCPCS | Performed by: UROLOGY

## 2022-07-12 PROCEDURE — 1036F TOBACCO NON-USER: CPT | Performed by: UROLOGY

## 2022-07-12 PROCEDURE — 99214 OFFICE O/P EST MOD 30 MIN: CPT | Performed by: UROLOGY

## 2022-07-12 PROCEDURE — G8427 DOCREV CUR MEDS BY ELIG CLIN: HCPCS | Performed by: UROLOGY

## 2022-07-12 RX ORDER — TAMSULOSIN HYDROCHLORIDE 0.4 MG/1
0.4 CAPSULE ORAL DAILY
Qty: 90 CAPSULE | Refills: 3 | Status: SHIPPED | OUTPATIENT
Start: 2022-07-12 | End: 2022-10-10

## 2022-07-12 NOTE — PROGRESS NOTES
SINDI Bustillos MD        [address] [address] [address]  Dept: [phone]  Dept Fax: [phone]: [address] [address] Urology Office Note -     Patient:  Vicky Newberry  YOB: 1960  Date: 7/12/2022    The patient is a 65 y.o. male who presents today for evaluation of the following problems:   Chief Complaint   Patient presents with    Urinary Frequency    Follow-up     no PSA done    referred/consultation requested by Miguel Kee MD.    HISTORY OF PRESENT ILLNESS:     Right testicular pain  resolved    ED  Has discussed trimix with dr Robin Head in past. Has been prescribed caverject in the past    LUTS  Stopped sanctura do to side effect profile  oab symptoms worsening        Requested/reviewed records from Miguel Kee MD office and/or outside physician/EMR    (Patient's old records have been requested, reviewed and pertinent findings summarized in today's note.)    Procedures Today: N/A      Last several PSA's:  Lab Results   Component Value Date    PSA 0.35 11/19/2018    PSA 0.35 06/22/2017    PSA 0.3 05/06/2016       Last total testosterone:  No results found for: TESTOSTERONE    Urinalysis today:  No results found for this visit on 07/12/22. Last BUN and creatinine:  Lab Results   Component Value Date    BUN 18 07/07/2022     Lab Results   Component Value Date    CREATININE 0.9 07/07/2022         Imaging Reviewed during this Office Visit:   Favian Hastings MD independently reviewed the images and verified the radiology reports from:    No results found.     PAST MEDICAL, FAMILY AND SOCIAL HISTORY:  Past Medical History:   Diagnosis Date    Diabetes mellitus ([address]     Diabetes mellitus out of control ([address]     Glucose intolerance (impaired glucose tolerance)     HTN (hypertension)     Hyperlipidemia      Past Surgical History:   Procedure Laterality Date    APPENDECTOMY  2006    COLECTOMY  05/23/2017    Robotic sigmoid     COLONOSCOPY  04/28/2017    [facility] [place] COLONOSCOPY  11/2017    Dr. Jt Mcconnell 1 yr follow-up     KNEE ARTHROSCOPY  12/7/11       Right lateral meniscectomy    LIPOMA RESECTION  2007    forehead 2007 Dr Petra Miller      as a child    VENTRICULAR ABLATION SURGERY  07/07/2022    SVT     Family History   Problem Relation Age of Onset    Diabetes Mother     High Blood Pressure Mother     Cancer Mother         breast, skin    Heart Disease Father     Stroke Father      Outpatient Medications Marked as Taking for the 7/12/22 encounter (Office Visit) with Paul Moe MD   Medication Sig Dispense Refill    atorvastatin (LIPITOR) 40 MG tablet Take 1 tablet by mouth daily 90 tablet 1    SITagliptin (JANUVIA) 100 MG tablet TAKE 1 TABLET BY MOUTH EVERY DAY 90 tablet 1    blood glucose test strips (ONETOUCH ULTRA) strip USE TO TEST TWICE A  strip 3    TRESIBA FLEXTOUCH 100 UNIT/ML SOPN INJECT 30 UNITS INTO THE SKIN 2 TIMES DAILY (Patient taking differently: 50 Units in the morning and at bedtime ) 60 mL 2    Garlic Oil 2967 MG CAPS Garlic Active 9644 MG PO Daily April 20th, 2021 12:23pm      coenzyme Q10 100 MG CAPS capsule Coenzyme Q10 (Coq-10) 100 mg Capsule Active 100 MG PO Daily April 20th, 2021 12:23pm      Bilberry, Vaccinium myrtillus, 80 MG CAPS Bilberry Fruit Extract Active 160 MG PO Daily February 21st, 2022 10:24am      amLODIPine (NORVASC) 5 MG tablet Amlodipine Active 5 MG PO Daily 30 February 22nd, 2022 4:38pm      glimepiride (AMARYL) 4 MG tablet Take 2 tablets by mouth every morning (before breakfast) 180 tablet 1    metFORMIN (GLUCOPHAGE) 1000 MG tablet Take 1 tablet by mouth 2 times daily (with meals) 180 tablet 1    lisinopril (PRINIVIL;ZESTRIL) 10 MG tablet 1 tablet daily (Patient taking differently: 10 mg 2 times daily ) 90 tablet 1    Insulin Pen Needle (B-D UF III MINI PEN NEEDLES) 31G X 5 MM MISC BID Dx: E11.9 200 each 3    nystatin-triamcinolone (MYCOLOG II) 348334-2.1 UNIT/GM-% cream Apply topically 2 times daily. 60 g 5    SOFT TOUCH LANCETS MISC 1 Cartridge by Does not apply route 2 times daily 100 each 3    aspirin EC 81 MG EC tablet Take 1 tablet by mouth daily. 30 tablet 3    Krill Oil CAPS Take 1 tablet by mouth daily.  therapeutic multivitamin-minerals (THERAGRAN-M) tablet Take 1 tablet by mouth daily. Zithromax [azithromycin dihydrate]  Social History     Tobacco Use   Smoking Status Former Smoker    Packs/day: 0.50    Years: 12.00    Pack years: 6.00    Types: Cigarettes    Start date: 1982   Chapito Saucedo Quit date: 2005    Years since quittin.5   Smokeless Tobacco Never Used      (If patient a smoker, smoking cessation counseling offered)   Social History     Substance and Sexual Activity   Alcohol Use Yes    Comment: RARELY       REVIEW OF SYSTEMS:  Constitutional: negative  Eyes: negative  Respiratory: negative  Cardiovascular: negative  Gastrointestinal: negative  Genitourinary: see HPI  Musculoskeletal: negative  Skin: negative   Neurological: negative  Hematological/Lymphatic: negative  Psychological: negative        Physical Exam:    This a 65 y.o. male  Vitals:    22 0836   BP: 120/80     Body mass index is 37.53 kg/m². Constitutional: Patient in no acute distress; No tenderness of right testicle. Right varicocele vs epididymal cyst      Assessment and Plan        1. Erectile dysfunction, unspecified erectile dysfunction type    2. Right testicular pain    3. Urinary frequency               Plan:      Right epididymitis- no longer an issue If it recurs, will start motrin/doxy and get scrotal u/s  ED- not at treatment goal. Will order trimix, which he will come back to be taught  bph/Frequency- stopped sanctura due to dry mouth. Worsening.  Start flomax      F/u in 1 month for flomax check and trimix teaching        Prescriptions Ordered:  No orders of the defined types were placed in this encounter. Orders Placed:  No orders of the defined types were placed in this encounter.            Lico Mckeon MD

## 2022-07-14 ENCOUNTER — NURSE ONLY (OUTPATIENT)
Dept: CARDIOLOGY CLINIC | Age: 62
End: 2022-07-14

## 2022-07-14 NOTE — PROGRESS NOTES
.Pt here post SVT for incision check. Bilateral groin, and right jugular sites free of hematoma, hard knots, redness, or drainage. No bruising, numbness or tingling noted. Aware to call the office in anything changes. PT SAYS HE FEELS GOOD.  HE SAID HE DOES NOT HAVE ANY PALPITATIONS

## 2022-07-20 NOTE — TELEPHONE ENCOUNTER
Last visit- 10/13/2020  Next visit- 12/28/2020    Requested Prescriptions     Pending Prescriptions Disp Refills    metFORMIN (GLUCOPHAGE-XR) 500 MG extended release tablet 180 tablet 1     Sig: Taking 1 tablet AM and 1 tablet evening       [facility]
(1) obesity (BMI greater than 25)

## 2022-08-03 ENCOUNTER — OFFICE VISIT (OUTPATIENT)
Dept: CARDIOLOGY CLINIC | Age: 62
End: 2022-08-03
Payer: COMMERCIAL

## 2022-08-03 VITALS
DIASTOLIC BLOOD PRESSURE: 73 MMHG | BODY MASS INDEX: 38.01 KG/M2 | WEIGHT: 296.2 LBS | HEART RATE: 69 BPM | HEIGHT: 74 IN | SYSTOLIC BLOOD PRESSURE: 131 MMHG | OXYGEN SATURATION: 99 %

## 2022-08-03 DIAGNOSIS — I47.1 SVT (SUPRAVENTRICULAR TACHYCARDIA) (HCC): Primary | ICD-10-CM

## 2022-08-03 PROCEDURE — 1036F TOBACCO NON-USER: CPT | Performed by: INTERNAL MEDICINE

## 2022-08-03 PROCEDURE — G8417 CALC BMI ABV UP PARAM F/U: HCPCS | Performed by: INTERNAL MEDICINE

## 2022-08-03 PROCEDURE — 3017F COLORECTAL CA SCREEN DOC REV: CPT | Performed by: INTERNAL MEDICINE

## 2022-08-03 PROCEDURE — 93000 ELECTROCARDIOGRAM COMPLETE: CPT | Performed by: INTERNAL MEDICINE

## 2022-08-03 PROCEDURE — G8427 DOCREV CUR MEDS BY ELIG CLIN: HCPCS | Performed by: INTERNAL MEDICINE

## 2022-08-03 PROCEDURE — 99214 OFFICE O/P EST MOD 30 MIN: CPT | Performed by: INTERNAL MEDICINE

## 2022-08-03 NOTE — PROGRESS NOTES
[address] ([place])  [facility]  Dept: [phone]  Cardiac Electrophysiology: Consultation Note  Patient's demographics:  Date:   8/3/2022  Patient name:              Mahendra Victoria  YOB: 1960  Sex:    male   MRN:   [id number]    Primary Care Physician:  Yasmin Dimas MD    Referring Physician:  Tania Purvis MD    Reason for Consultation:  Supraventricular tachycardia. Clinical Summary:  62/M with adenosibe sensitive narrow complex tachycardia, refractory to beta-blocker presents electively for EP study on 7/7/2022 with induction of typical AVNRT. He underwent successful slow pathway ablation. Michael Storey is doing well. No more palpitations. Feeling better. No groin pain bleeding or swelling. 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 Eyes: Negative for pain, discharge, redness and itching. Respiratory: Negative for apnea, cough  Gastrointestinal: Negative for blood in stool, constipation, diarrhea   Endocrine: Negative for cold intolerance, heat intolerance, polydipsia. Genitourinary: Negative for dysuria, enuresis, flank pain and hematuria. Musculoskeletal: Negative for arthralgias, joint swelling and neck pain. Neurological: Negative for numbness and headaches. Psychiatric/Behavioral: Negative for agitation, confusion, decreased concentration and dysphoric mood.       Past Medical History[de-identified]  Past Medical History:   Diagnosis Date    Diabetes mellitus ([facility]     Diabetes mellitus out of control ([facility]     Glucose intolerance (impaired glucose tolerance)     HTN (hypertension)     Hyperlipidemia        Past Surgical History:  Past Surgical History:   Procedure Laterality Date    APPENDECTOMY 2006    COLECTOMY  2017    Robotic sigmoid     COLONOSCOPY  2017    Franki Bailey    COLONOSCOPY  2017    Dr. Franki Bailey 1 yr follow-up     KNEE ARTHROSCOPY  11       Right lateral meniscectomy    LIPOMA RESECTION  2007    forehead 2007 Dr Valorie Robles      as a child    VENTRICULAR ABLATION SURGERY  2022    SVT       Family History:  Family History   Problem Relation Age of Onset    Diabetes Mother     High Blood Pressure Mother     Cancer Mother         breast, skin    Heart Disease Father     Stroke Father         Social History:  Social History     Socioeconomic History    Marital status:      Spouse name: Yisel   Tobacco Use    Smoking status: Former     Packs/day: 0.50     Years: 12.00     Pack years: 6.00     Types: Cigarettes     Start date: 1982     Quit date: 2005     Years since quittin.5    Smokeless tobacco: Never   Vaping Use    Vaping Use: Never used   Substance and Sexual Activity    Alcohol use: Yes     Comment: RARELY    Drug use: No        Allergies:   Allergies   Allergen Reactions    Zithromax [Azithromycin Dihydrate] Anaphylaxis     Throat swelling        Medications:  Current Outpatient Medications   Medication Sig Dispense Refill    tamsulosin (FLOMAX) 0.4 MG capsule Take 1 capsule by mouth daily 90 capsule 3    atorvastatin (LIPITOR) 40 MG tablet Take 1 tablet by mouth daily 90 tablet 1    SITagliptin (JANUVIA) 100 MG tablet TAKE 1 TABLET BY MOUTH EVERY DAY 90 tablet 1    blood glucose test strips (ONETOUCH ULTRA) strip USE TO TEST TWICE A  strip 3    TRESIBA FLEXTOUCH 100 UNIT/ML SOPN INJECT 30 UNITS INTO THE SKIN 2 TIMES DAILY (Patient taking differently: 50 Units in the morning and at bedtime ) 60 mL 2    Garlic Oil 7902 MG CAPS Garlic Active 0837 MG PO Daily 2021 12:23pm      coenzyme Q10 100 MG CAPS capsule Coenzyme Q10 (Coq-10) 100 mg Capsule Active 100 MG PO Daily 2021 12:23pm      Bilberry, Vaccinium myrtillus, 80 MG CAPS Bilberry Fruit Extract Active 160 MG PO Daily February 21st, 2022 10:24am      amLODIPine (NORVASC) 5 MG tablet Amlodipine Active 5 MG PO Daily 30 February 22nd, 2022 4:38pm      glimepiride (AMARYL) 4 MG tablet Take 2 tablets by mouth every morning (before breakfast) 180 tablet 1    metFORMIN (GLUCOPHAGE) 1000 MG tablet Take 1 tablet by mouth 2 times daily (with meals) 180 tablet 1    lisinopril (PRINIVIL;ZESTRIL) 10 MG tablet 1 tablet daily (Patient taking differently: 10 mg 2 times daily ) 90 tablet 1    Insulin Pen Needle (B-D UF III MINI PEN NEEDLES) 31G X 5 MM MISC BID Dx: E11.9 200 each 3    nystatin-triamcinolone (MYCOLOG II) 372942-9.1 UNIT/GM-% cream Apply topically 2 times daily. 60 g 5    SOFT TOUCH LANCETS MISC 1 Cartridge by Does not apply route 2 times daily 100 each 3    aspirin EC 81 MG EC tablet Take 1 tablet by mouth daily. 30 tablet 3    Krill Oil CAPS Take 1 tablet by mouth daily. therapeutic multivitamin-minerals (THERAGRAN-M) tablet Take 1 tablet by mouth daily. No current facility-administered medications for this visit. Physical Examination:  Vitals:    08/03/22 1525   BP: (!) 158/87   Pulse: 69   SpO2: 99%     Body mass index is 38.03 kg/m². GENERAL: Alert and oriented. No distress. EYES: No pallor or icterus. ENT: No cyanosis. No thyromegaly or cervical LAP. VESSELS: No jugular venous distension or carotid bruits. HEART: Normal S1/S2. No murmur, rub or gallop. LUNGS: Clear to auscultation. ABDOMEN: Soft and non-tender. EXTREMITIES: Trace lower extremity edema. Groins are soft nontender. No bleeding.   NEUROLOGICAL: Grossly normal.     Laboratory And Diagnostic Data  I have personally reviewed and interpreted the results of the following diagnostic testing    Lab Results   Component Value Date    WBC 6.6 07/07/2022    HGB 14.5 07/07/2022    HCT 42.7 07/07/2022     07/07/2022    CHOL 116 04/17/2021    TRIG 176 (H) 04/17/2021    HDL 33 (L) 04/17/2021    LDLDIRECT 65 04/17/2021    ALT 33 04/17/2021    AST 25 04/17/2021     07/07/2022    K 4.4 07/07/2022     07/07/2022    CREATININE 0.9 07/07/2022    BUN 18 07/07/2022    CO2 25 07/07/2022    INR 0.97 07/07/2022    LABA1C 8.3 (H) 06/30/2022    LABMICR 8.8 11/19/2018     Echocardiogram 6/12/2019: LVEF 60%, LVWT 1.0 cm, LAD/JAQUELIN 47. No significant valvular abnormalities. ECG Sinus rhythm with normal MI interval, QRS duration and QTc interval.   Coronary angiogram 4/20/2021: Non-obstructive CAD. Event monitor 3/22/2022: Multiple episodes of narrow complex tachycardia (rates 168 BPM). 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 Sinus bradycardia, resolved. Mild CAD, HTN, HPL, obesity and DM on insulin,    Assessment And Recommendations:  Doing well. No more palpitations. No groin complications. Follow-up with Dr. Ching Stearns. Thank you Chelsie Stearns for allowing me to participate in the care of your patient. Please call me if you have any questions. **This report has been created using voice recognition software. It may contain minor errors which are inherent in voice recognition technology. **     Dalton Ravi MD, [facility], [facility], [facility] on 8/3/2022 at 3:33 PM

## 2022-08-09 DIAGNOSIS — E11.9 TYPE 2 DIABETES MELLITUS WITHOUT COMPLICATION, UNSPECIFIED WHETHER LONG TERM INSULIN USE (HCC): ICD-10-CM

## 2022-08-09 RX ORDER — GLIMEPIRIDE 4 MG/1
8 TABLET ORAL
Qty: 180 TABLET | Refills: 1 | Status: SHIPPED | OUTPATIENT
Start: 2022-08-09

## 2022-08-09 NOTE — TELEPHONE ENCOUNTER
Last visit- 6/30/2022  Next visit- 9/29/2022    Requested Prescriptions     Pending Prescriptions Disp Refills    glimepiride (AMARYL) 4 MG tablet 180 tablet 1     Sig: Take 2 tablets by mouth every morning (before breakfast)

## 2022-08-13 DIAGNOSIS — E11.9 TYPE 2 DIABETES MELLITUS WITHOUT COMPLICATION, UNSPECIFIED WHETHER LONG TERM INSULIN USE (HCC): ICD-10-CM

## 2022-08-25 ENCOUNTER — OFFICE VISIT (OUTPATIENT)
Dept: ENT CLINIC | Age: 62
End: 2022-08-25
Payer: COMMERCIAL

## 2022-08-25 VITALS
HEART RATE: 52 BPM | OXYGEN SATURATION: 97 % | SYSTOLIC BLOOD PRESSURE: 138 MMHG | HEIGHT: 75 IN | TEMPERATURE: 97.7 F | DIASTOLIC BLOOD PRESSURE: 86 MMHG | RESPIRATION RATE: 12 BRPM | BODY MASS INDEX: 35.99 KG/M2 | WEIGHT: 289.5 LBS

## 2022-08-25 DIAGNOSIS — J34.2 DEVIATED NASAL SEPTUM: ICD-10-CM

## 2022-08-25 DIAGNOSIS — Z87.891 FORMER SMOKER: ICD-10-CM

## 2022-08-25 DIAGNOSIS — K21.9 GASTROESOPHAGEAL REFLUX DISEASE WITHOUT ESOPHAGITIS: ICD-10-CM

## 2022-08-25 DIAGNOSIS — T46.4X5A ADVERSE EFFECT OF ANGIOTENSIN-CONVERTING ENZYME INHIBITOR, INITIAL ENCOUNTER: Primary | ICD-10-CM

## 2022-08-25 DIAGNOSIS — R05.9 COUGH: ICD-10-CM

## 2022-08-25 DIAGNOSIS — J34.3 HYPERTROPHY, NASAL, TURBINATE: ICD-10-CM

## 2022-08-25 PROCEDURE — G8427 DOCREV CUR MEDS BY ELIG CLIN: HCPCS | Performed by: OTOLARYNGOLOGY

## 2022-08-25 PROCEDURE — 99204 OFFICE O/P NEW MOD 45 MIN: CPT | Performed by: OTOLARYNGOLOGY

## 2022-08-25 PROCEDURE — G8417 CALC BMI ABV UP PARAM F/U: HCPCS | Performed by: OTOLARYNGOLOGY

## 2022-08-25 PROCEDURE — 31575 DIAGNOSTIC LARYNGOSCOPY: CPT | Performed by: OTOLARYNGOLOGY

## 2022-08-25 PROCEDURE — 3017F COLORECTAL CA SCREEN DOC REV: CPT | Performed by: OTOLARYNGOLOGY

## 2022-08-25 PROCEDURE — 1036F TOBACCO NON-USER: CPT | Performed by: OTOLARYNGOLOGY

## 2022-08-25 RX ORDER — AMOXICILLIN AND CLAVULANATE POTASSIUM 875; 125 MG/1; MG/1
1 TABLET, FILM COATED ORAL 2 TIMES DAILY
Qty: 28 TABLET | Refills: 0 | Status: CANCELLED | OUTPATIENT
Start: 2022-08-25 | End: 2022-09-08

## 2022-08-25 RX ORDER — FLUTICASONE PROPIONATE 50 MCG
1 SPRAY, SUSPENSION (ML) NASAL DAILY
Qty: 16 G | Refills: 3 | Status: CANCELLED | OUTPATIENT
Start: 2022-08-25

## 2022-08-25 ASSESSMENT — ENCOUNTER SYMPTOMS
DIARRHEA: 0
VOMITING: 0
RHINORRHEA: 1
SINUS PRESSURE: 0
SHORTNESS OF BREATH: 0
ABDOMINAL PAIN: 0
COLOR CHANGE: 0
VOICE CHANGE: 1
APNEA: 0
WHEEZING: 0
SORE THROAT: 0
STRIDOR: 0
CHOKING: 0
FACIAL SWELLING: 0
TROUBLE SWALLOWING: 0
COUGH: 1
CHEST TIGHTNESS: 0
NAUSEA: 0

## 2022-08-25 NOTE — PATIENT INSTRUCTIONS
Check with PCP regarding avoiding ACE/ARB BP meds. Call if worse. Do not fill up stomach for 3 hours before bedtime. Elevate the head of the bed, perhaps with a foam wedge. May take Pepcid 20 mg 2 times a day.

## 2022-08-25 NOTE — PROGRESS NOTES
[address] EAR, NOSE AND THROAT  [facility]  Dept: [phone]  Dept Fax: [phone]  Loc: [phone]    Charleen Mercer is a 65 y.o. male who was referred byNavi Skaggs AP* for:  Chief Complaint   Patient presents with    [address] patient here for evaluation of his hoarseness. Patient states the hoarseness comes and goes. Been happening for about 6 months. Referred by Shayy Pedraza CNP. Min Wilfrido HPI:     Charleen Mercer is a 65 y.o. male who presents today for evaluation     For the last 6 months has a hoarseness that comes and goes. He's taking Lisinopril. Had been on for quit a while. Doesn't feel like he has to clear throat. Doesn't feel like anything is stuck in in throat. Former smoker, Quit smoking in 2004 or 2005. Wakes up with gunk in thoat no head of bed raised. Has snack before bed. Snores sometimes. Right side of nose feels dry and bothers him, has trouble breathing through his nose. Has a cough, doesn't wake him up at night. 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 Takes Naproxen due for pain due to hurting his shoulder. History:      Allergies   Allergen Reactions    Zithromax [Azithromycin Dihydrate] Anaphylaxis     Throat swelling     Current Outpatient Medications   Medication Sig Dispense Refill    metFORMIN (GLUCOPHAGE) 1000 MG tablet TAKE 1 TABLET BY MOUTH TWICE A DAY WITH MEALS 180 tablet 1    glimepiride (AMARYL) 4 MG tablet Take 2 tablets by mouth every morning (before breakfast) 180 tablet 1    tamsulosin (FLOMAX) 0.4 MG capsule Take 1 capsule by mouth daily 90 capsule 3    atorvastatin (LIPITOR) 40 MG tablet Take 1 tablet by mouth daily 90 tablet 1    SITagliptin (JANUVIA) 100 MG tablet TAKE 1 TABLET BY MOUTH EVERY DAY 90 tablet 1    blood glucose test strips (ONETOUCH ULTRA) strip USE TO TEST TWICE A  strip 3    TRESIBA FLEXTOUCH 100 UNIT/ML SOPN INJECT 30 UNITS INTO THE SKIN 2 TIMES DAILY (Patient taking differently: 50 Units in the morning and at bedtime) 60 mL 2    Garlic Oil 1122 MG CAPS Garlic Active 0451 MG PO Daily April 20th, 2021 12:23pm      coenzyme Q10 100 MG CAPS capsule Coenzyme Q10 (Coq-10) 100 mg Capsule Active 100 MG PO Daily April 20th, 2021 12:23pm      Bilberry, Vaccinium myrtillus, 80 MG CAPS Bilberry Fruit Extract Active 160 MG PO Daily February 21st, 2022 10:24am      amLODIPine (NORVASC) 5 MG tablet Amlodipine Active 5 MG PO Daily 30 February 22nd, 2022 4:38pm      lisinopril (PRINIVIL;ZESTRIL) 10 MG tablet 1 tablet daily (Patient taking differently: 10 mg 2 times daily) 90 tablet 1    nystatin-triamcinolone (MYCOLOG II) 156992-3.1 UNIT/GM-% cream Apply topically 2 times daily. 60 g 5    SOFT TOUCH LANCETS MISC 1 Cartridge by Does not apply route 2 times daily 100 each 3    aspirin EC 81 MG EC tablet Take 1 tablet by mouth daily. 30 tablet 3    Krill Oil CAPS Take 1 tablet by mouth daily. therapeutic multivitamin-minerals (THERAGRAN-M) tablet Take 1 tablet by mouth daily. Insulin Pen Needle (B-D UF III MINI PEN NEEDLES) 31G X 5 MM MISC USE AS DIRECTED TWICE A  each 3     No current facility-administered medications for this visit.      Past Medical History:   Diagnosis Date    Diabetes mellitus ([address]     Diabetes mellitus out of control ([address]     Glucose intolerance (impaired glucose tolerance)     HTN (hypertension)     Hyperlipidemia       Past Surgical History:   Procedure Laterality Date    APPENDECTOMY  2006    COLECTOMY  05/23/2017    Robotic sigmoid     COLONOSCOPY  04/28/2017    Abisai Thomas    COLONOSCOPY  11/2017    Dr. Abisai Thomas 1 yr follow-up     KNEE ARTHROSCOPY  12/7/11       Right lateral meniscectomy    LIPOMA RESECTION  2007    forehead 2007 Dr Susan Engle      as a child    VENTRICULAR ABLATION SURGERY  07/07/2022    SVT     Family History   Problem Relation Age of Onset    Diabetes Mother     High Blood Pressure Mother     Cancer Mother         breast, skin    Heart Disease Father     Stroke Father      Social History     Tobacco Use    Smoking status: Former     Packs/day: 0.50     Years: 12.00     Pack years: 6.00     Types: Cigarettes     Start date: 1982     Quit date: 2005     Years since quittin.7    Smokeless tobacco: Never   Substance Use Topics    Alcohol use: Yes     Comment: RARELY       Subjective:       Review of Systems   Constitutional:  Negative for activity change, appetite change, chills, diaphoresis, fatigue, fever and unexpected weight change. HENT:  Positive for rhinorrhea, tinnitus and voice change. Negative for congestion, dental problem, ear discharge, ear pain, facial swelling, hearing loss, mouth sores, nosebleeds, postnasal drip, sinus pressure, sneezing, sore throat and trouble swallowing. Eyes:  Negative for visual disturbance. Respiratory:  Positive for cough. Negative for apnea, choking, chest tightness, shortness of breath, wheezing and stridor. Cardiovascular:  Negative for chest pain, palpitations and leg swelling. Gastrointestinal:  Negative for abdominal pain, diarrhea, nausea and vomiting. Endocrine: Positive for polyuria. Negative for cold intolerance, heat intolerance and polydipsia. Genitourinary:  Negative for dysuria, enuresis and hematuria. Musculoskeletal:  Negative for arthralgias, gait problem, neck pain and neck stiffness. Skin:  Negative for color change and rash. Allergic/Immunologic: Negative for environmental allergies, food allergies and immunocompromised state. Neurological:  Negative for dizziness, syncope, facial asymmetry, speech difficulty, light-headedness and headaches. Hematological:  Negative for adenopathy. Does not bruise/bleed easily. Psychiatric/Behavioral:  Negative for confusion and sleep disturbance. The patient is not nervous/anxious.       Objective:     /86 Prilosec 20 mg with supper. IMakeda CMA ([facility]), am scribing for, and in the presence of Dr. Cory Barrett. Electronically signed by Ajit Yun CMA ([facility]) on 8/25/22 at 9:07 AM EDT. (Please note that portions of this note were completed with a voice recognition program. Efforts were made to edit the dictations butoccasionally words are mis-transcribed.)    I agree to the above documentation placed by my scribe. I have personally evaluated this patient. Additional findings are as noted. I reviewed the scribe's note and agree with the documented findings and plan of care. Any areas of disagreement are corrected. I agree with the chief complaint, past medical history, past surgical history, allergies, medications, social and family history as documented unless otherwise noted below.      Electronically signed by Katerin Joaquin MD on 9/25/2022 at 1:39 PM

## 2022-09-09 DIAGNOSIS — E11.9 TYPE 2 DIABETES MELLITUS WITHOUT COMPLICATION, UNSPECIFIED WHETHER LONG TERM INSULIN USE (HCC): ICD-10-CM

## 2022-09-12 RX ORDER — PEN NEEDLE, DIABETIC 31 GX5/16"
NEEDLE, DISPOSABLE MISCELLANEOUS
Qty: 100 EACH | Refills: 3 | Status: SHIPPED | OUTPATIENT
Start: 2022-09-12

## 2022-10-05 ENCOUNTER — OFFICE VISIT (OUTPATIENT)
Dept: INTERNAL MEDICINE CLINIC | Age: 62
End: 2022-10-05
Payer: COMMERCIAL

## 2022-10-05 VITALS
TEMPERATURE: 97.8 F | HEART RATE: 68 BPM | DIASTOLIC BLOOD PRESSURE: 93 MMHG | WEIGHT: 296.8 LBS | SYSTOLIC BLOOD PRESSURE: 146 MMHG | HEIGHT: 75 IN | BODY MASS INDEX: 36.9 KG/M2 | RESPIRATION RATE: 20 BRPM

## 2022-10-05 DIAGNOSIS — R49.0 HOARSENESS: ICD-10-CM

## 2022-10-05 DIAGNOSIS — E11.9 TYPE 2 DIABETES MELLITUS WITHOUT COMPLICATION, UNSPECIFIED WHETHER LONG TERM INSULIN USE (HCC): Primary | ICD-10-CM

## 2022-10-05 LAB — HBA1C MFR BLD: 7.4 % (ref 4.3–5.7)

## 2022-10-05 PROCEDURE — G8484 FLU IMMUNIZE NO ADMIN: HCPCS | Performed by: NURSE PRACTITIONER

## 2022-10-05 PROCEDURE — 83036 HEMOGLOBIN GLYCOSYLATED A1C: CPT | Performed by: NURSE PRACTITIONER

## 2022-10-05 PROCEDURE — G8417 CALC BMI ABV UP PARAM F/U: HCPCS | Performed by: NURSE PRACTITIONER

## 2022-10-05 PROCEDURE — 2022F DILAT RTA XM EVC RTNOPTHY: CPT | Performed by: NURSE PRACTITIONER

## 2022-10-05 PROCEDURE — 3017F COLORECTAL CA SCREEN DOC REV: CPT | Performed by: NURSE PRACTITIONER

## 2022-10-05 PROCEDURE — 3051F HG A1C>EQUAL 7.0%<8.0%: CPT | Performed by: NURSE PRACTITIONER

## 2022-10-05 PROCEDURE — G8427 DOCREV CUR MEDS BY ELIG CLIN: HCPCS | Performed by: NURSE PRACTITIONER

## 2022-10-05 PROCEDURE — 99213 OFFICE O/P EST LOW 20 MIN: CPT | Performed by: NURSE PRACTITIONER

## 2022-10-05 PROCEDURE — 1036F TOBACCO NON-USER: CPT | Performed by: NURSE PRACTITIONER

## 2022-10-05 RX ORDER — INSULIN DEGLUDEC INJECTION 100 U/ML
50 INJECTION, SOLUTION SUBCUTANEOUS 2 TIMES DAILY
Qty: 5 ADJUSTABLE DOSE PRE-FILLED PEN SYRINGE | Refills: 3 | Status: SHIPPED | OUTPATIENT
Start: 2022-10-05

## 2022-10-05 RX ORDER — HYDROCHLOROTHIAZIDE 25 MG/1
25 TABLET ORAL EVERY MORNING
Qty: 90 TABLET | Refills: 1 | Status: SHIPPED | OUTPATIENT
Start: 2022-10-05

## 2022-10-05 ASSESSMENT — PATIENT HEALTH QUESTIONNAIRE - PHQ9
2. FEELING DOWN, DEPRESSED OR HOPELESS: 0
SUM OF ALL RESPONSES TO PHQ QUESTIONS 1-9: 0
SUM OF ALL RESPONSES TO PHQ QUESTIONS 1-9: 0
SUM OF ALL RESPONSES TO PHQ9 QUESTIONS 1 & 2: 0
SUM OF ALL RESPONSES TO PHQ QUESTIONS 1-9: 0
1. LITTLE INTEREST OR PLEASURE IN DOING THINGS: 0
SUM OF ALL RESPONSES TO PHQ QUESTIONS 1-9: 0

## 2022-10-05 NOTE — PROGRESS NOTES
[facility]  [address]  Dept: [phone]  Dept Fax: [phone]: [phone]     Visit Date:  10/5/2022    Patient:  Yao Combs  YOB: 1960    HPI:     Chief Complaint   Patient presents with    Diabetes    Follow-up     Yao Combs (:  1960) is a 62 y.o. male, here for evaluation of the following medical concerns: diabetes and hoarseness     I last seen Sasha [facility] 3 months ago. He follows routinely with Dr. Salvador Cheney. Heart catheterization with Dr. Carmela Cobb within 1 year, 50% coronary artery stenosis. No stents needed. He has had some episodes of SVT. One requiring hospitalization at [facility]. He had an ablation  with Dr. Latosha Edwards     Diabetes-  Diagnosed 7-8 years ago. He states his diabetes is not well controlled. A1C 7.4% today, was 8.3% previously. Is on metformin 1000 mg BID, glimepiride 4 mg BID, and Tresiba 50 units BID. He did not tolerate Invokana, caused increased urination. States that he misses oral medications occasionally. Is on lisinopril 20 mg daily, with last urinary microalbumin/crt ratio 8.5 in 2020. States hypoglycemic events not present. Is able to voice signs/symptoms of hypoglycemia as dizziness and diaphoresis. Reports checking glucose 1 time per day, with range of glucose readings 80s- 220s per patient statement. Did not bring meter for download. Attempting dietary modification for diabetes management and/or weight loss. Currently follows with diabetes clinic, dietician, CDE. Does report difficulty with obtaining medications. Follows with Dr. Linus Javier annually. BP reccs <140/90 (<130/80 in CVD risk). On aspirin 81 mg daily and atorvastatin 40 mg daily. Denies polyuria, polydipsia, polyphagia. Reports occasionally neuropathic symptoms including numbness, tingling. Does not have any wounds to feet.         Most recent labs in 2022, revealed a calcium of 9.20. He denies any bone fragility. No fractures. Ionized calcium 1.23 in 12/2020. He is on calcium 600 mg daily. Is following with urology for urinary issues and ED. Complains of hoarseness, voice change for the passed 6 months. Does not smoke currently, has a 12 year 0.5 PPD smoke history. TSH WNL.  He is following with ENT    Medications    Current Outpatient Medications:     hydroCHLOROthiazide (HYDRODIURIL) 25 MG tablet, Take 1 tablet by mouth every morning, Disp: 90 tablet, Rfl: 1    Insulin Degludec (TRESIBA FLEXTOUCH) 100 UNIT/ML SOPN, Inject 50 Units into the skin in the morning and at bedtime, Disp: 5 Adjustable Dose Pre-filled Pen Syringe, Rfl: 3    Insulin Pen Needle (B-D UF III MINI PEN NEEDLES) 31G X 5 MM MISC, USE AS DIRECTED TWICE A DAY, Disp: 100 each, Rfl: 3    metFORMIN (GLUCOPHAGE) 1000 MG tablet, TAKE 1 TABLET BY MOUTH TWICE A DAY WITH MEALS, Disp: 180 tablet, Rfl: 1    glimepiride (AMARYL) 4 MG tablet, Take 2 tablets by mouth every morning (before breakfast), Disp: 180 tablet, Rfl: 1    tamsulosin (FLOMAX) 0.4 MG capsule, Take 1 capsule by mouth daily, Disp: 90 capsule, Rfl: 3    atorvastatin (LIPITOR) 40 MG tablet, Take 1 tablet by mouth daily, Disp: 90 tablet, Rfl: 1    SITagliptin (JANUVIA) 100 MG tablet, TAKE 1 TABLET BY MOUTH EVERY DAY, Disp: 90 tablet, Rfl: 1    blood glucose test strips (ONETOUCH ULTRA) strip, USE TO TEST TWICE A DAY, Disp: 200 strip, Rfl: 3    Garlic Oil 4131 MG CAPS, Garlic Active 0877 MG PO Daily April 20th, 2021 12:23pm, Disp: , Rfl:     coenzyme Q10 100 MG CAPS capsule, Coenzyme Q10 (Coq-10) 100 mg Capsule Active 100 MG PO Daily April 20th, 2021 12:23pm, Disp: , Rfl:     Bilberry, Vaccinium myrtillus, 80 MG CAPS, Bilberry Fruit Extract Active 160 MG PO Daily February 21st, 2022 10:24am, Disp: , Rfl:     amLODIPine (NORVASC) 5 MG tablet, Amlodipine Active 5 MG PO Daily 30 February 22nd, 2022 4:38pm, Disp: , Rfl: nystatin-triamcinolone (MYCOLOG II) 813999-9.1 UNIT/GM-% cream, Apply topically 2 times daily. , Disp: 60 g, Rfl: 5    SOFT TOUCH LANCETS MISC, 1 Cartridge by Does not apply route 2 times daily, Disp: 100 each, Rfl: 3    aspirin EC 81 MG EC tablet, Take 1 tablet by mouth daily. , Disp: 30 tablet, Rfl: 3    Krill Oil CAPS, Take 1 tablet by mouth daily. , Disp: , Rfl:     therapeutic multivitamin-minerals (THERAGRAN-M) tablet, Take 1 tablet by mouth daily. , Disp: , Rfl:     The patient is allergic to zithromax [azithromycin dihydrate]. Past Medical History  Rayne Jurado  has a past medical history of Diabetes mellitus ([address] Diabetes mellitus out of control, Glucose intolerance (impaired glucose tolerance), HTN (hypertension), and Hyperlipidemia. Past Surgical History  The patient  has a past surgical history that includes lipoma resection (2007); Appendectomy (2006); Knee arthroscopy (12/7/11); Tonsillectomy; Colonoscopy (04/28/2017); colectomy (05/23/2017); Colonoscopy (11/2017); and Ventricular ablation surgery (07/07/2022). Family History  This patient's family history includes Cancer in his mother; Diabetes in his mother; Heart Disease in his father; High Blood Pressure in his mother; Stroke in his father. Social History  Rayne Jurado  reports that he quit smoking about 17 years ago. His smoking use included cigarettes. He started smoking about 40 years ago. He has a 6.00 pack-year smoking history. He has never used smokeless tobacco. He reports current alcohol use. He reports that he does not use drugs.     Health Maintenance:    Health Maintenance   Topic Date Due    COVID-19 Vaccine (1) Never done    Pneumococcal 0-64 years Vaccine (1 - PCV) Never done    HIV screen  Never done    DTaP/Tdap/Td vaccine (1 - Tdap) Never done    Shingles vaccine (1 of 2) Never done    Diabetic foot exam  12/06/2019    Diabetic retinal exam  12/06/2020    Diabetic microalbuminuria test  12/29/2021    Lipids  04/17/2022    Flu vaccine (1) Never done    A1C test (Diabetic or Prediabetic)  10/05/2023    Depression Screen  10/05/2023    Colorectal Cancer Screen  01/06/2030    Hepatitis C screen  Completed    Hepatitis A vaccine  Aged Out    Hib vaccine  Aged Out    Meningococcal (ACWY) vaccine  Aged Out       Subjective:      Review of Systems   Constitutional:  Negative for chills, fatigue and fever. HENT:  Positive for sore throat and voice change. Negative for congestion, ear pain, rhinorrhea, sinus pressure, sinus pain, tinnitus and trouble swallowing. Eyes:  Negative for photophobia and visual disturbance. Respiratory:  Negative for cough, shortness of breath and wheezing. Cardiovascular:  Negative for chest pain, palpitations and leg swelling. Gastrointestinal:  Negative for abdominal distention, abdominal pain, blood in stool, constipation, diarrhea, nausea and vomiting. Endocrine: Positive for polyuria. Negative for polydipsia and polyphagia. Genitourinary:  Negative for difficulty urinating, dysuria, frequency, hematuria and urgency. Musculoskeletal:  Negative for myalgias. Skin:  Negative for rash and wound. Neurological:  Negative for dizziness, light-headedness and headaches. Psychiatric/Behavioral:  Negative for dysphoric mood and sleep disturbance. The patient is not nervous/anxious. Objective:     BP (!) 146/93 (Site: Right Lower Arm, Position: Sitting)   Pulse 68   Temp 97.8 °F (36.6 °C) (Tympanic)   Resp 20   Ht 6' 3\" (1.905 m)   Wt 296 lb 12.8 oz (134.6 kg)   BMI 37.10 kg/m²     Physical Exam  Constitutional:       General: He is not in acute distress. Appearance: Normal appearance. He is normal weight. He is not ill-appearing. HENT:      Head: Normocephalic and atraumatic. Right Ear: Tympanic membrane, ear canal and external ear normal.      Left Ear: Tympanic membrane, ear canal and external ear normal.      Nose: Nose normal. No congestion or rhinorrhea.       Mouth/Throat: Mouth: Mucous membranes are moist.      Pharynx: Oropharynx is clear. No oropharyngeal exudate or posterior oropharyngeal erythema. Eyes:      Extraocular Movements: Extraocular movements intact. Conjunctiva/sclera: Conjunctivae normal.      Pupils: Pupils are equal, round, and reactive to light. Neck:      Vascular: No carotid bruit. Cardiovascular:      Rate and Rhythm: Normal rate and regular rhythm. Pulses: Normal pulses. Heart sounds: No murmur heard. No friction rub. No gallop. Pulmonary:      Effort: Pulmonary effort is normal. No respiratory distress. Breath sounds: Normal breath sounds. No wheezing, rhonchi or rales. Abdominal:      General: Abdomen is flat. Bowel sounds are normal. There is no distension. Palpations: Abdomen is soft. Tenderness: There is no abdominal tenderness. Musculoskeletal:         General: No swelling or tenderness. Normal range of motion. Cervical back: Normal range of motion and neck supple. No muscular tenderness. Right lower leg: Edema (trace) present. Left lower leg: Edema (trace) present. Lymphadenopathy:      Cervical: No cervical adenopathy. Skin:     General: Skin is warm and dry. Capillary Refill: Capillary refill takes less than 2 seconds. Findings: No erythema, lesion or rash. Neurological:      General: No focal deficit present. Mental Status: He is alert and oriented to person, place, and time. Motor: No weakness. Coordination: Coordination normal.      Deep Tendon Reflexes: Reflexes normal.   Psychiatric:         Mood and Affect: Mood normal.         Behavior: Behavior normal.         Thought Content:  Thought content normal.         Judgment: Judgment normal.       Labs Reviewed 10/5/2022:    Lab Results   Component Value Date    WBC 6.6 07/07/2022    HGB 14.5 07/07/2022    HCT 42.7 07/07/2022     07/07/2022    CHOL 116 04/17/2021    TRIG 176 (H) 04/17/2021    HDL 33 (L) 04/17/2021    LDLDIRECT 65 04/17/2021    ALT 33 04/17/2021    AST 25 04/17/2021     07/07/2022    K 4.4 07/07/2022     07/07/2022    CREATININE 0.9 07/07/2022    BUN 18 07/07/2022    CO2 25 07/07/2022    PSA 0.35 11/19/2018    INR 0.97 07/07/2022    LABA1C 7.4 (H) 10/05/2022    LABMICR 8.8 11/19/2018       Assessment/Plan      1. Type 2 diabetes mellitus without complication, unspecified whether long term insulin use (HCC)    - POCT glycosylated hemoglobin (Hb A1C)  - Insulin Degludec (TRESIBA FLEXTOUCH) 100 UNIT/ML SOPN; Inject 50 Units into the skin in the morning and at bedtime  Dispense: 5 Adjustable Dose Pre-filled Pen Syringe; Refill: 3  - CBC; Future  - Comprehensive Metabolic Panel; Future  - Microalbumin / Creatinine Urine Ratio; Future  - TSH With Reflex Ft4; Future  - Lipid Panel; Future  - Encouraged dietary modifications and exercise regimen to promote optimal glycemic control    2. Hoarseness    - Stop lisiniopril  - Follow with ENT as scheduled      Return in about 6 months (around 4/5/2023). Patient given educational materials - see patient instructions. Discussed use, benefit, and side effects of prescribed medications. All patient questions answered. Pt voiced understanding. Reviewed health maintenance.        Electronically signed Rexanne Lundborg, APRN - CNP on 10/5/22 at 2:33 PM EDT

## 2022-10-16 ASSESSMENT — ENCOUNTER SYMPTOMS
CONSTIPATION: 0
BLOOD IN STOOL: 0
TROUBLE SWALLOWING: 0
SHORTNESS OF BREATH: 0
WHEEZING: 0
VOMITING: 0
ABDOMINAL PAIN: 0
COUGH: 0
RHINORRHEA: 0
SINUS PAIN: 0
SORE THROAT: 1
VOICE CHANGE: 1
NAUSEA: 0
PHOTOPHOBIA: 0
SINUS PRESSURE: 0
ABDOMINAL DISTENTION: 0
DIARRHEA: 0

## 2022-11-14 ENCOUNTER — OFFICE VISIT (OUTPATIENT)
Dept: FAMILY MEDICINE CLINIC | Age: 62
End: 2022-11-14
Payer: COMMERCIAL

## 2022-11-14 VITALS
RESPIRATION RATE: 16 BRPM | HEART RATE: 72 BPM | DIASTOLIC BLOOD PRESSURE: 80 MMHG | BODY MASS INDEX: 35.37 KG/M2 | WEIGHT: 283 LBS | TEMPERATURE: 98.8 F | SYSTOLIC BLOOD PRESSURE: 130 MMHG

## 2022-11-14 DIAGNOSIS — U07.1 COVID-19: Primary | ICD-10-CM

## 2022-11-14 DIAGNOSIS — Z20.822 SUSPECTED COVID-19 VIRUS INFECTION: ICD-10-CM

## 2022-11-14 LAB
Lab: ABNORMAL
QC PASS/FAIL: ABNORMAL
SARS-COV-2 RDRP RESP QL NAA+PROBE: POSITIVE

## 2022-11-14 PROCEDURE — 87635 SARS-COV-2 COVID-19 AMP PRB: CPT | Performed by: FAMILY MEDICINE

## 2022-11-14 PROCEDURE — 3017F COLORECTAL CA SCREEN DOC REV: CPT | Performed by: FAMILY MEDICINE

## 2022-11-14 PROCEDURE — G8484 FLU IMMUNIZE NO ADMIN: HCPCS | Performed by: FAMILY MEDICINE

## 2022-11-14 PROCEDURE — G8427 DOCREV CUR MEDS BY ELIG CLIN: HCPCS | Performed by: FAMILY MEDICINE

## 2022-11-14 PROCEDURE — G8417 CALC BMI ABV UP PARAM F/U: HCPCS | Performed by: FAMILY MEDICINE

## 2022-11-14 PROCEDURE — 3078F DIAST BP <80 MM HG: CPT | Performed by: FAMILY MEDICINE

## 2022-11-14 PROCEDURE — 1036F TOBACCO NON-USER: CPT | Performed by: FAMILY MEDICINE

## 2022-11-14 PROCEDURE — 99213 OFFICE O/P EST LOW 20 MIN: CPT | Performed by: FAMILY MEDICINE

## 2022-11-14 PROCEDURE — 3074F SYST BP LT 130 MM HG: CPT | Performed by: FAMILY MEDICINE

## 2022-11-14 RX ORDER — LISINOPRIL 20 MG/1
20 TABLET ORAL DAILY
COMMUNITY

## 2022-11-14 RX ORDER — NIRMATRELVIR AND RITONAVIR 300-100 MG
KIT ORAL
Qty: 30 TABLET | Refills: 0 | Status: SHIPPED | OUTPATIENT
Start: 2022-11-14 | End: 2022-11-19

## 2022-11-14 RX ORDER — LISINOPRIL AND HYDROCHLOROTHIAZIDE 20; 12.5 MG/1; MG/1
TABLET ORAL
COMMUNITY
End: 2022-11-14 | Stop reason: SDUPTHER

## 2022-11-14 SDOH — ECONOMIC STABILITY: FOOD INSECURITY: WITHIN THE PAST 12 MONTHS, YOU WORRIED THAT YOUR FOOD WOULD RUN OUT BEFORE YOU GOT MONEY TO BUY MORE.: NEVER TRUE

## 2022-11-14 SDOH — ECONOMIC STABILITY: FOOD INSECURITY: WITHIN THE PAST 12 MONTHS, THE FOOD YOU BOUGHT JUST DIDN'T LAST AND YOU DIDN'T HAVE MONEY TO GET MORE.: NEVER TRUE

## 2022-11-14 ASSESSMENT — SOCIAL DETERMINANTS OF HEALTH (SDOH): HOW HARD IS IT FOR YOU TO PAY FOR THE VERY BASICS LIKE FOOD, HOUSING, MEDICAL CARE, AND HEATING?: NOT HARD AT ALL

## 2022-11-14 NOTE — LETTER
[address]  [address]  [facility] [address]  Phone: [phone]  Fax: [phone]    Joshua Cain MD        November 14, 2022     Patient: Katerin Valdez   YOB: 1960   Date of Visit: 11/14/2022       To Whom It May Concern: It is my medical opinion that Gertie Simmonds should remain out of work until 11/21/22. If you have any questions or concerns, please don't hesitate to call.     Sincerely,        Joshua Cain MD

## 2022-11-27 ASSESSMENT — ENCOUNTER SYMPTOMS
SORE THROAT: 0
ABDOMINAL PAIN: 0
SHORTNESS OF BREATH: 0
CONSTIPATION: 0
EYE PAIN: 0
SINUS PRESSURE: 0
COUGH: 1
NAUSEA: 0
ABDOMINAL DISTENTION: 0
DIARRHEA: 0
RHINORRHEA: 0

## 2022-11-27 NOTE — PROGRESS NOTES
[address] [address]  Dept: [phone]  Dept Fax: [phone]  Loc: [phone]  PROGRESS NOTE      VisitDate: 11/14/2022    Phuong Khalil is a 65 y.o. male who presents today for:     Chief Complaint   Patient presents with    Pharyngitis     Sore throat, sinus drainage, cough,  congestion since 11/12/2022         Subjective:  HPI  Patient with several day history of sore throat sinus drainage cough congestion fever body aches feeling tired. Review of Systems   Constitutional:  Positive for fatigue and fever. Negative for appetite change. HENT:  Positive for congestion. Negative for ear pain, postnasal drip, rhinorrhea, sinus pressure and sore throat. Eyes:  Negative for pain and visual disturbance. Respiratory:  Positive for cough. Negative for shortness of breath. Cardiovascular:  Negative for chest pain. Gastrointestinal:  Negative for abdominal distention, abdominal pain, constipation, diarrhea and nausea. Genitourinary:  Negative for dysuria, frequency and urgency. Musculoskeletal:  Positive for myalgias. Negative for arthralgias. Skin:  Negative for rash. Neurological:  Negative for dizziness.    Past Medical History:   Diagnosis Date    Diabetes mellitus ([address]     Diabetes mellitus out of control     Glucose intolerance (impaired glucose tolerance)     HTN (hypertension)     Hyperlipidemia       Past Surgical History:   Procedure Laterality Date    APPENDECTOMY  2006    COLECTOMY  05/23/2017    Robotic sigmoid     COLONOSCOPY  04/28/2017    Leah Canseco    COLONOSCOPY  11/2017    Dr. Leah Canseco 1 yr follow-up     KNEE ARTHROSCOPY  12/7/11       Right lateral meniscectomy    LIPOMA RESECTION  2007    forehead 2007 Dr Jalil Booth      as a child    VENTRICULAR ABLATION SURGERY  07/07/2022    SVT     Family History   Problem Relation Age of Onset    Diabetes Mother     High Blood Pressure Mother     Cancer Mother         breast, skin    Heart Disease Father     Stroke Father      Social History     Tobacco Use    Smoking status: Former     Packs/day: 0.50     Years: 12.00     Pack years: 6.00     Types: Cigarettes     Start date: 1982     Quit date: 2005     Years since quittin.9    Smokeless tobacco: Never   Substance Use Topics    Alcohol use: Yes     Comment: RARELY      Current Outpatient Medications   Medication Sig Dispense Refill    lisinopril (PRINIVIL;ZESTRIL) 20 MG tablet Take 20 mg by mouth daily      hydroCHLOROthiazide (HYDRODIURIL) 25 MG tablet Take 1 tablet by mouth every morning 90 tablet 1    Insulin Degludec (TRESIBA FLEXTOUCH) 100 UNIT/ML SOPN Inject 50 Units into the skin in the morning and at bedtime 5 Adjustable Dose Pre-filled Pen Syringe 3    Insulin Pen Needle (B-D UF III MINI PEN NEEDLES) 31G X 5 MM MISC USE AS DIRECTED TWICE A  each 3    metFORMIN (GLUCOPHAGE) 1000 MG tablet TAKE 1 TABLET BY MOUTH TWICE A DAY WITH MEALS 180 tablet 1    glimepiride (AMARYL) 4 MG tablet Take 2 tablets by mouth every morning (before breakfast) 180 tablet 1    tamsulosin (FLOMAX) 0.4 MG capsule Take 1 capsule by mouth daily 90 capsule 3    atorvastatin (LIPITOR) 40 MG tablet Take 1 tablet by mouth daily 90 tablet 1    SITagliptin (JANUVIA) 100 MG tablet TAKE 1 TABLET BY MOUTH EVERY DAY 90 tablet 1    blood glucose test strips (ONETOUCH ULTRA) strip USE TO TEST TWICE A  strip 3    Garlic Oil 8313 MG CAPS Garlic Active 6714 MG PO Daily 2021 12:23pm      coenzyme Q10 100 MG CAPS capsule Coenzyme Q10 (Coq-10) 100 mg Capsule Active 100 MG PO Daily 2021 12:23pm      Bilberry, Vaccinium myrtillus, 80 MG CAPS Bilberry Fruit Extract Active 160 MG PO Daily 2022 10:24am      nystatin-triamcinolone (MYCOLOG II) 292672-1.1 UNIT/GM-% cream Apply topically 2 times daily.  60 g 5    SOFT TOUCH LANCETS MISC 1 Cartridge by Does not apply route 2 times daily 100 each 3    aspirin EC 81 MG EC tablet Take 1 tablet by mouth daily. 30 tablet 3    Krill Oil CAPS Take 1 tablet by mouth daily. therapeutic multivitamin-minerals (THERAGRAN-M) tablet Take 1 tablet by mouth daily. No current facility-administered medications for this visit. Allergies   Allergen Reactions    Zithromax [Azithromycin Dihydrate] Anaphylaxis     Throat swelling     Health Maintenance   Topic Date Due    COVID-19 Vaccine (1) Never done    Pneumococcal 0-64 years Vaccine (1 - PCV) Never done    HIV screen  Never done    DTaP/Tdap/Td vaccine (1 - Tdap) Never done    Shingles vaccine (1 of 2) Never done    Diabetic foot exam  12/06/2019    Diabetic retinal exam  12/06/2020    Diabetic microalbuminuria test  12/29/2021    Lipids  04/17/2022    Flu vaccine (1) Never done    A1C test (Diabetic or Prediabetic)  10/05/2023    Depression Screen  10/05/2023    Colorectal Cancer Screen  01/06/2030    Hepatitis C screen  Completed    Hepatitis A vaccine  Aged Out    Hib vaccine  Aged Out    Meningococcal (ACWY) vaccine  Aged Out         Objective:     Physical Exam  Constitutional:       General: He is not in acute distress. Appearance: He is well-developed. He is not diaphoretic. HENT:      Head: Normocephalic and atraumatic. Right Ear: External ear normal.      Left Ear: External ear normal.   Eyes:      Conjunctiva/sclera: Conjunctivae normal.   Neck:      Vascular: No JVD. Cardiovascular:      Rate and Rhythm: Normal rate and regular rhythm. Heart sounds: Normal heart sounds. Pulmonary:      Effort: Pulmonary effort is normal.      Breath sounds: Normal breath sounds. No wheezing or rales. Musculoskeletal:         General: No tenderness. Skin:     General: Skin is warm and dry. Coloration: Skin is not pale. Neurological:      Mental Status: He is alert and oriented to person, place, and time.      /80   Pulse 72   Temp 98.8 °F (37.1 °C) (Oral)   Resp 16   Wt 283 lb (128.4 kg)   BMI 35.37 kg/m²       Impression/Plan:  1. COVID-19    2. Suspected COVID-19 virus infection      Requested Prescriptions     Signed Prescriptions Disp Refills    nirmatrelvir/ritonavir (PAXLOVID, 300/100,) 20 x 150 MG & 10 x 100MG TBPK 30 tablet 0     Sig: Take 3 tablets (two 150 mg nirmatrelvir and one 100 mg ritonavir tablets) by mouth every 12 hours for 5 days. Orders Placed This Encounter   Procedures    POCT COVID-19 Rapid, NAAT     Order Specific Question:   Is this test for diagnosis or screening? Answer:   Diagnosis of ill patient     Order Specific Question:   Symptomatic for COVID-19 as defined by CDC? Answer:   Yes     Order Specific Question:   Date of Symptom Onset     Answer:   11/12/2022     Order Specific Question:   Hospitalized for COVID-19? Answer:   No     Order Specific Question:   Admitted to ICU for COVID-19? Answer:   No     Order Specific Question:   Employed in healthcare setting? Answer:   No     Order Specific Question:   Resident in a congregate (group) care setting? Answer:   No     Order Specific Question:   Pregnant: Answer:   No       Patient giveneducational materials - see patient instructions. Discussed use, benefit, and side effects of prescribed medications. All patient questions answered. Pt voiced understanding. Reviewed health maintenance. Patient agreedwith treatment plan. Follow up as directed. **This report has been created using voice recognition software. It may contain minor errorswhich are inherent in voice recognition technology. **       Electronically signed by Richard Richard MD on 11/27/2022 at 12:14 PM

## 2022-12-19 DIAGNOSIS — E03.9 HYPOTHYROIDISM, UNSPECIFIED TYPE: Primary | ICD-10-CM

## 2022-12-19 RX ORDER — LISINOPRIL 20 MG/1
20 TABLET ORAL DAILY
Qty: 30 TABLET | Refills: 3 | Status: SHIPPED | OUTPATIENT
Start: 2022-12-19

## 2022-12-26 ENCOUNTER — HOSPITAL ENCOUNTER (EMERGENCY)
Age: 62
Discharge: HOME | End: 2022-12-26
Payer: COMMERCIAL

## 2022-12-26 VITALS
TEMPERATURE: 96.9 F | WEIGHT: 300 LBS | OXYGEN SATURATION: 98 % | SYSTOLIC BLOOD PRESSURE: 140 MMHG | DIASTOLIC BLOOD PRESSURE: 70 MMHG | HEART RATE: 87 BPM | BODY MASS INDEX: 37.3 KG/M2 | HEIGHT: 75 IN | RESPIRATION RATE: 18 BRPM

## 2022-12-26 DIAGNOSIS — J10.1 INFLUENZA A: Primary | ICD-10-CM

## 2022-12-26 LAB
FLU A ANTIGEN: POSITIVE
INFLUENZA B AG, EIA: NEGATIVE

## 2022-12-26 PROCEDURE — 99214 OFFICE O/P EST MOD 30 MIN: CPT

## 2022-12-26 PROCEDURE — 87804 INFLUENZA ASSAY W/OPTIC: CPT

## 2022-12-26 ASSESSMENT — ENCOUNTER SYMPTOMS
NAUSEA: 1
COUGH: 1

## 2022-12-26 ASSESSMENT — PAIN - FUNCTIONAL ASSESSMENT: PAIN_FUNCTIONAL_ASSESSMENT: NONE - DENIES PAIN

## 2022-12-26 NOTE — ED PROVIDER NOTES
[facility]  Urgent Care Encounter      CHIEF COMPLAINT       Chief Complaint   Patient presents with    Cough    Congestion    Breast Pain     Left, with lumps. New onset           Nurses Notes reviewed and I agree except as noted in the HPI. HISTORY OF PRESENT ILLNESS   Charleen Mercer is a 65 y.o. male who presents to urgent care for evaluation 4-5 days of cough congestion nausea. No fevers. Taking over-the-counter medicines. Patient unsure sick contacts. Also complaining of left breast pain for several weeks. Denies chest pain or shortness of breath. States the pain is worse when he lays down at night with certain movements. REVIEW OF SYSTEMS     Review of Systems   HENT:  Positive for congestion. Respiratory:  Positive for cough. Gastrointestinal:  Positive for nausea. Musculoskeletal:  Positive for myalgias. All other systems reviewed and are negative. PAST MEDICAL HISTORY         Diagnosis Date    Diabetes mellitus ([facility] [address]     Diabetes mellitus out of control     Glucose intolerance (impaired glucose tolerance)     HTN (hypertension)     Hyperlipidemia        SURGICAL HISTORY     Patient  has a past surgical history that includes lipoma resection (2007); Appendectomy (2006); Knee arthroscopy (12/7/11); Tonsillectomy; Colonoscopy (04/28/2017); colectomy (05/23/2017); Colonoscopy (11/2017); and Ventricular ablation surgery (07/07/2022).     CURRENT MEDICATIONS       Discharge Medication List as of 12/26/2022 11:06 AM        CONTINUE these medications which have NOT CHANGED    Details   lisinopril (PRINIVIL;ZESTRIL) 20 MG tablet Take 1 tablet by mouth daily, Disp-30 tablet, R-3Normal      hydroCHLOROthiazide (HYDRODIURIL) 25 MG tablet Take 1 tablet by mouth every morning, Disp-90 tablet, R-1Normal      Insulin Degludec (TRESIBA FLEXTOUCH) 100 UNIT/ML SOPN Inject 50 Units into the skin in the morning and at bedtime, Disp-5 Adjustable Dose Pre-filled Pen Syringe, R-3Normal      Insulin Pen Needle (B-D UF III MINI PEN NEEDLES) 31G X 5 MM MISC Disp-100 each, R-3, NormalUSE AS DIRECTED TWICE A DAY      metFORMIN (GLUCOPHAGE) 1000 MG tablet TAKE 1 TABLET BY MOUTH TWICE A DAY WITH MEALS, Disp-180 tablet, R-1Normal      glimepiride (AMARYL) 4 MG tablet Take 2 tablets by mouth every morning (before breakfast), Disp-180 tablet, R-1Normal      tamsulosin (FLOMAX) 0.4 MG capsule Take 1 capsule by mouth daily, Disp-90 capsule, R-3Normal      atorvastatin (LIPITOR) 40 MG tablet Take 1 tablet by mouth daily, Disp-90 tablet, R-1Normal      SITagliptin (JANUVIA) 100 MG tablet TAKE 1 TABLET BY MOUTH EVERY DAY, Disp-90 tablet, R-1Normal      blood glucose test strips (ONETOUCH ULTRA) strip USE TO TEST TWICE A DAY, Disp-200 strip, [id number]      Garlic Oil 4980 MG CAPS Garlic Active 7044 MG PO Daily April 20th, 2021 12:23pmHistorical Med      coenzyme Q10 100 MG CAPS capsule Coenzyme Q10 (Coq-10) 100 mg Capsule Active 100 MG PO Daily April 20th, 2021 12:23pmHistorical Med      Liyah Hews, Vaccinium myrtillus, 80 MG CAPS Bilberry Fruit Extract Active 160 MG PO Daily February 21st, 2022 10:24amHistorical Med      nystatin-triamcinolone (MYCOLOG II) 026115-2.1 UNIT/GM-% cream Apply topically 2 times daily. , Disp-60 g, R-5, Normal      SOFT TOUCH LANCETS MISC 2 TIMES DAILY Starting 5/12/2016, Until Discontinued, Disp-100 each, R-3, Normal      aspirin EC 81 MG EC tablet Take 1 tablet by mouth daily. , Disp-30 tablet, R-3      Krill Oil CAPS Take 1 tablet by mouth daily. therapeutic multivitamin-minerals (THERAGRAN-M) tablet Take 1 tablet by mouth daily. ALLERGIES     Patient is is allergic to zithromax [azithromycin dihydrate]. FAMILY HISTORY     Patient'sfamily history includes Cancer in his mother; Diabetes in his mother; Heart Disease in his father; High Blood Pressure in his mother; Stroke in his father.     SOCIAL HISTORY     Patient  reports that he quit smoking about 17 years ago. His smoking use included cigarettes. He started smoking about 41 years ago. He has a 6.00 pack-year smoking history. He has never used smokeless tobacco. He reports current alcohol use. He reports that he does not use drugs. PHYSICAL EXAM     ED TRIAGE VITALS  BP: (!) 140/70, Temp: 96.9 °F (36.1 °C), Heart Rate: 87, Resp: 18, SpO2: 98 %  Physical Exam  Vitals and nursing note reviewed. Constitutional:       Comments: Left breast examined without any palpable masses. HENT:      Head: Normocephalic. Right Ear: External ear normal.      Left Ear: External ear normal.   Eyes:      Pupils: Pupils are equal, round, and reactive to light. Neck:      Trachea: No tracheal deviation. Cardiovascular:      Rate and Rhythm: Normal rate and regular rhythm. Heart sounds: Normal heart sounds. No murmur heard. No friction rub. No gallop. Pulmonary:      Effort: Pulmonary effort is normal. No respiratory distress. Breath sounds: Normal breath sounds. No wheezing or rales. Chest:      Chest wall: No tenderness. Abdominal:      General: Bowel sounds are normal.      Palpations: Abdomen is soft. Tenderness: There is no abdominal tenderness. There is no rebound. Genitourinary:     Penis: Normal.    Musculoskeletal:         General: Normal range of motion. Cervical back: Full passive range of motion without pain, normal range of motion and neck supple. Lymphadenopathy:      Cervical: No cervical adenopathy. Skin:     General: Skin is warm and dry. Findings: No rash. Neurological:      Mental Status: He is alert and oriented to person, place, and time.    Psychiatric:         Judgment: Judgment normal.       DIAGNOSTIC RESULTS   Labs:  Results for orders placed or performed during the hospital encounter of 12/26/22   Rapid influenza A/B antigens   Result Value Ref Range    Flu A Antigen Positive (A) NEGATIVE    Influenza B Ag, EIA Negative NEGATIVE       IMAGING:  No orders to display      URGENT CARE COURSE:     Vitals:    12/26/22 1036   BP: (!) 140/70   Pulse: 87   Resp: 18   Temp: 96.9 °F (36.1 °C)   TempSrc: Tympanic   SpO2: 98%   Weight: 300 lb (136.1 kg)   Height: 6' 3\" (1.905 m)       Medications - No data to display  PROCEDURES:  None  FINAL IMPRESSION      1. Influenza A        DISPOSITION/PLAN   DISPOSITION Decision To Discharge 12/26/2022 11:03:57 AM    Patient positive influenza A. Symptoms present for-5 days so he is outside the window for Tamiflu. Recommend supportive treatment. Follow-up with PCP or go to ER for ongoing or worsening symptoms. recommend PCP follow-up for left breast pain.       PATIENT REFERRED TO:  Jose Alberto Mahmood MD  [address] [place] [address]  [phone]    In 1 week  As needed, If symptoms worsen  DISCHARGE MEDICATIONS:  Discharge Medication List as of 12/26/2022 11:06 AM        Discharge Medication List as of 12/26/2022 11:06 AM          LIZZ Chinchilla - LIZZ Mtz CNP  12/26/22 5661

## 2022-12-26 NOTE — Clinical Note
Robbie Kolb was seen and treated in our emergency department on 12/26/2022.  He may return to work on 12/28/2022.       If you have any questions or concerns, please don't hesitate to call.      Stacey Glass, LIZZ - CNP

## 2023-01-17 DIAGNOSIS — E03.9 HYPOTHYROIDISM, UNSPECIFIED TYPE: ICD-10-CM

## 2023-01-17 RX ORDER — LISINOPRIL 20 MG/1
TABLET ORAL
Qty: 30 TABLET | Refills: 3 | Status: SHIPPED | OUTPATIENT
Start: 2023-01-17

## 2023-01-23 DIAGNOSIS — E11.9 TYPE 2 DIABETES MELLITUS WITHOUT COMPLICATION, UNSPECIFIED WHETHER LONG TERM INSULIN USE (HCC): ICD-10-CM

## 2023-01-24 DIAGNOSIS — E11.9 TYPE 2 DIABETES MELLITUS WITHOUT COMPLICATION, UNSPECIFIED WHETHER LONG TERM INSULIN USE (HCC): ICD-10-CM

## 2023-01-24 RX ORDER — INSULIN DEGLUDEC INJECTION 100 U/ML
INJECTION, SOLUTION SUBCUTANEOUS
Qty: 5 ADJUSTABLE DOSE PRE-FILLED PEN SYRINGE | Refills: 2 | Status: SHIPPED | OUTPATIENT
Start: 2023-01-24 | End: 2023-01-24 | Stop reason: SDUPTHER

## 2023-01-24 RX ORDER — INSULIN DEGLUDEC INJECTION 100 U/ML
INJECTION, SOLUTION SUBCUTANEOUS
Qty: 5 ADJUSTABLE DOSE PRE-FILLED PEN SYRINGE | Refills: 2 | Status: SHIPPED | OUTPATIENT
Start: 2023-01-24

## 2023-02-27 ENCOUNTER — TELEPHONE (OUTPATIENT)
Dept: INTERNAL MEDICINE CLINIC | Age: 63
End: 2023-02-27

## 2023-02-27 NOTE — TELEPHONE ENCOUNTER
Called pt to reschedule appt on 04/05/2023 due to KD not being in office on wednesdays. LVM for pt to return call @ [phone] to reschedule appt.

## 2023-03-08 ENCOUNTER — HOSPITAL ENCOUNTER (OUTPATIENT)
Dept: ULTRASOUND IMAGING | Age: 63
Discharge: HOME | End: 2023-03-08
Payer: COMMERCIAL

## 2023-03-08 DIAGNOSIS — R63.4 WEIGHT LOSS: ICD-10-CM

## 2023-03-08 DIAGNOSIS — R10.10 UPPER ABDOMINAL PAIN: ICD-10-CM

## 2023-03-08 PROCEDURE — 93975 VASCULAR STUDY: CPT

## 2023-03-08 PROCEDURE — 76705 ECHO EXAM OF ABDOMEN: CPT

## 2023-03-09 DIAGNOSIS — E11.9 TYPE 2 DIABETES MELLITUS WITHOUT COMPLICATION, UNSPECIFIED WHETHER LONG TERM INSULIN USE (HCC): ICD-10-CM

## 2023-03-09 RX ORDER — PEN NEEDLE, DIABETIC 31 GX5/16"
NEEDLE, DISPOSABLE MISCELLANEOUS
Qty: 100 EACH | Refills: 3 | Status: SHIPPED | OUTPATIENT
Start: 2023-03-09

## 2023-03-09 RX ORDER — INSULIN DEGLUDEC INJECTION 100 U/ML
INJECTION, SOLUTION SUBCUTANEOUS
Qty: 5 ADJUSTABLE DOSE PRE-FILLED PEN SYRINGE | Refills: 2 | Status: SHIPPED | OUTPATIENT
Start: 2023-03-09

## 2023-03-11 DIAGNOSIS — E11.9 TYPE 2 DIABETES MELLITUS WITHOUT COMPLICATION, UNSPECIFIED WHETHER LONG TERM INSULIN USE (HCC): ICD-10-CM

## 2023-04-06 ENCOUNTER — OFFICE VISIT (OUTPATIENT)
Dept: INTERNAL MEDICINE CLINIC | Age: 63
End: 2023-04-06
Payer: COMMERCIAL

## 2023-04-06 VITALS
SYSTOLIC BLOOD PRESSURE: 152 MMHG | WEIGHT: 286 LBS | HEIGHT: 75 IN | DIASTOLIC BLOOD PRESSURE: 84 MMHG | RESPIRATION RATE: 18 BRPM | BODY MASS INDEX: 35.56 KG/M2 | HEART RATE: 58 BPM

## 2023-04-06 DIAGNOSIS — E83.51 HYPOCALCEMIA: ICD-10-CM

## 2023-04-06 DIAGNOSIS — E11.9 TYPE 2 DIABETES MELLITUS WITHOUT COMPLICATION, UNSPECIFIED WHETHER LONG TERM INSULIN USE (HCC): Primary | ICD-10-CM

## 2023-04-06 DIAGNOSIS — N40.0 BENIGN PROSTATIC HYPERPLASIA, UNSPECIFIED WHETHER LOWER URINARY TRACT SYMPTOMS PRESENT: ICD-10-CM

## 2023-04-06 DIAGNOSIS — R53.83 OTHER FATIGUE: ICD-10-CM

## 2023-04-06 PROCEDURE — 99214 OFFICE O/P EST MOD 30 MIN: CPT | Performed by: NURSE PRACTITIONER

## 2023-04-06 PROCEDURE — 3074F SYST BP LT 130 MM HG: CPT | Performed by: NURSE PRACTITIONER

## 2023-04-06 PROCEDURE — 3078F DIAST BP <80 MM HG: CPT | Performed by: NURSE PRACTITIONER

## 2023-04-06 RX ORDER — INSULIN DEGLUDEC INJECTION 100 U/ML
50 INJECTION, SOLUTION SUBCUTANEOUS
Qty: 15 ADJUSTABLE DOSE PRE-FILLED PEN SYRINGE | Refills: 2 | Status: SHIPPED | OUTPATIENT
Start: 2023-04-06 | End: 2023-04-18 | Stop reason: SDUPTHER

## 2023-04-18 ENCOUNTER — TELEPHONE (OUTPATIENT)
Dept: INTERNAL MEDICINE CLINIC | Age: 63
End: 2023-04-18

## 2023-04-18 DIAGNOSIS — E11.9 TYPE 2 DIABETES MELLITUS WITHOUT COMPLICATION, UNSPECIFIED WHETHER LONG TERM INSULIN USE (HCC): ICD-10-CM

## 2023-04-18 RX ORDER — INSULIN DEGLUDEC INJECTION 100 U/ML
INJECTION, SOLUTION SUBCUTANEOUS
Qty: 15 ADJUSTABLE DOSE PRE-FILLED PEN SYRINGE | Refills: 2
Start: 2023-04-18

## 2023-04-18 NOTE — TELEPHONE ENCOUNTER
Isidro Peguero called back and this RN informed him of the lab work. Isidro Peguero stated he did not want to start a new injectable medication. Pt stated his blood sugars have been pretty good.

## 2023-04-18 NOTE — TELEPHONE ENCOUNTER
----- Message from LIZZ Torres CNP sent at 4/17/2023  3:37 PM EDT -----  Labs reviewed. Heavy metal screening normal. Hgb A1C 8.5%. will he try a weekly injectable?

## 2023-04-20 ASSESSMENT — ENCOUNTER SYMPTOMS
CONSTIPATION: 0
SINUS PAIN: 0
SORE THROAT: 0
BLOOD IN STOOL: 0
VOMITING: 0
NAUSEA: 0
DIARRHEA: 0
ABDOMINAL DISTENTION: 0
COUGH: 0
WHEEZING: 0
PHOTOPHOBIA: 0
ABDOMINAL PAIN: 0
TROUBLE SWALLOWING: 0
VOICE CHANGE: 1
RHINORRHEA: 0
SINUS PRESSURE: 0
SHORTNESS OF BREATH: 0

## 2023-04-24 DIAGNOSIS — E11.9 TYPE 2 DIABETES MELLITUS WITHOUT COMPLICATION, UNSPECIFIED WHETHER LONG TERM INSULIN USE (HCC): ICD-10-CM

## 2023-05-11 ENCOUNTER — OFFICE VISIT (OUTPATIENT)
Dept: FAMILY MEDICINE CLINIC | Age: 63
End: 2023-05-11

## 2023-05-11 VITALS
SYSTOLIC BLOOD PRESSURE: 142 MMHG | BODY MASS INDEX: 35.86 KG/M2 | HEIGHT: 75 IN | WEIGHT: 288.4 LBS | DIASTOLIC BLOOD PRESSURE: 78 MMHG | TEMPERATURE: 97.5 F

## 2023-05-11 DIAGNOSIS — M79.2 NEURALGIA: ICD-10-CM

## 2023-05-11 DIAGNOSIS — E11.9 TYPE 2 DIABETES MELLITUS WITHOUT COMPLICATION, UNSPECIFIED WHETHER LONG TERM INSULIN USE (HCC): Chronic | ICD-10-CM

## 2023-05-11 DIAGNOSIS — M54.15 RADICULOPATHY OF THORACOLUMBAR REGION: Primary | ICD-10-CM

## 2023-05-11 DIAGNOSIS — B02.29 PHN (POSTHERPETIC NEURALGIA): ICD-10-CM

## 2023-05-11 RX ORDER — GABAPENTIN 100 MG/1
CAPSULE ORAL
Qty: 60 CAPSULE | Refills: 2 | Status: SHIPPED | OUTPATIENT
Start: 2023-05-11 | End: 2023-08-11

## 2023-05-11 SDOH — ECONOMIC STABILITY: HOUSING INSECURITY
IN THE LAST 12 MONTHS, WAS THERE A TIME WHEN YOU DID NOT HAVE A STEADY PLACE TO SLEEP OR SLEPT IN A SHELTER (INCLUDING NOW)?: NO

## 2023-05-11 SDOH — ECONOMIC STABILITY: FOOD INSECURITY: WITHIN THE PAST 12 MONTHS, THE FOOD YOU BOUGHT JUST DIDN'T LAST AND YOU DIDN'T HAVE MONEY TO GET MORE.: NEVER TRUE

## 2023-05-11 SDOH — ECONOMIC STABILITY: INCOME INSECURITY: HOW HARD IS IT FOR YOU TO PAY FOR THE VERY BASICS LIKE FOOD, HOUSING, MEDICAL CARE, AND HEATING?: NOT HARD AT ALL

## 2023-05-11 SDOH — ECONOMIC STABILITY: FOOD INSECURITY: WITHIN THE PAST 12 MONTHS, YOU WORRIED THAT YOUR FOOD WOULD RUN OUT BEFORE YOU GOT MONEY TO BUY MORE.: NEVER TRUE

## 2023-05-11 ASSESSMENT — PATIENT HEALTH QUESTIONNAIRE - PHQ9
SUM OF ALL RESPONSES TO PHQ QUESTIONS 1-9: 0
1. LITTLE INTEREST OR PLEASURE IN DOING THINGS: 0
SUM OF ALL RESPONSES TO PHQ QUESTIONS 1-9: 0
2. FEELING DOWN, DEPRESSED OR HOPELESS: 0
SUM OF ALL RESPONSES TO PHQ9 QUESTIONS 1 & 2: 0
SUM OF ALL RESPONSES TO PHQ QUESTIONS 1-9: 0
SUM OF ALL RESPONSES TO PHQ QUESTIONS 1-9: 0

## 2023-05-14 ASSESSMENT — ENCOUNTER SYMPTOMS
SHORTNESS OF BREATH: 0
COUGH: 0
DIARRHEA: 0
BACK PAIN: 1
ABDOMINAL DISTENTION: 0
RHINORRHEA: 0
SORE THROAT: 0
EYE PAIN: 0
NAUSEA: 0
CONSTIPATION: 0
ABDOMINAL PAIN: 0
SINUS PRESSURE: 0

## 2023-05-15 ENCOUNTER — HOSPITAL ENCOUNTER (OUTPATIENT)
Age: 63
Discharge: HOME | End: 2023-05-15
Payer: COMMERCIAL

## 2023-05-15 ENCOUNTER — HOSPITAL ENCOUNTER (OUTPATIENT)
Dept: GENERAL RADIOLOGY | Age: 63
Discharge: HOME | End: 2023-05-15
Payer: COMMERCIAL

## 2023-05-15 DIAGNOSIS — M54.15 RADICULOPATHY OF THORACOLUMBAR REGION: ICD-10-CM

## 2023-05-15 DIAGNOSIS — M79.2 NEURALGIA: ICD-10-CM

## 2023-05-15 LAB
ALBUMIN SERPL BCG-MCNC: 4.8 G/DL (ref 3.5–5.1)
ALP SERPL-CCNC: 85 U/L (ref 38–126)
ALT SERPL W/O P-5'-P-CCNC: 31 U/L (ref 11–66)
ANION GAP SERPL CALC-SCNC: 13 MEQ/L (ref 8–16)
AST SERPL-CCNC: 24 U/L (ref 5–40)
BASOPHILS ABSOLUTE: 0 THOU/MM3 (ref 0–0.1)
BASOPHILS NFR BLD AUTO: 0.4 %
BILIRUB SERPL-MCNC: 0.6 MG/DL (ref 0.3–1.2)
BUN SERPL-MCNC: 15 MG/DL (ref 7–22)
CALCIUM SERPL-MCNC: 9.5 MG/DL (ref 8.5–10.5)
CHLORIDE SERPL-SCNC: 106 MEQ/L (ref 98–111)
CO2 SERPL-SCNC: 26 MEQ/L (ref 23–33)
CREAT SERPL-MCNC: 0.9 MG/DL (ref 0.4–1.2)
DEPRECATED RDW RBC AUTO: 41.1 FL (ref 35–45)
EOSINOPHIL NFR BLD AUTO: 1.4 %
EOSINOPHILS ABSOLUTE: 0.1 THOU/MM3 (ref 0–0.4)
ERYTHROCYTE [DISTWIDTH] IN BLOOD BY AUTOMATED COUNT: 12.8 % (ref 11.5–14.5)
FOLATE SERPL-MCNC: > 20 NG/ML (ref 4.8–24.2)
GFR SERPL CREATININE-BSD FRML MDRD: > 60 ML/MIN/1.73M2
GLUCOSE SERPL-MCNC: 131 MG/DL (ref 70–108)
HCT VFR BLD AUTO: 43.9 % (ref 42–52)
HGB BLD-MCNC: 15.1 GM/DL (ref 14–18)
IMM GRANULOCYTES # BLD AUTO: 0.05 THOU/MM3 (ref 0–0.07)
IMM GRANULOCYTES NFR BLD AUTO: 0.7 %
LYMPHOCYTES ABSOLUTE: 2.5 THOU/MM3 (ref 1–4.8)
LYMPHOCYTES NFR BLD AUTO: 33.3 %
MAGNESIUM SERPL-MCNC: 1.8 MG/DL (ref 1.6–2.4)
MCH RBC QN AUTO: 30.4 PG (ref 26–33)
MCHC RBC AUTO-ENTMCNC: 34.4 GM/DL (ref 32.2–35.5)
MCV RBC AUTO: 88.5 FL (ref 80–94)
MONOCYTES ABSOLUTE: 0.6 THOU/MM3 (ref 0.4–1.3)
MONOCYTES NFR BLD AUTO: 8.3 %
NEUTROPHILS NFR BLD AUTO: 55.9 %
NRBC BLD AUTO-RTO: 0 /100 WBC
PLATELET # BLD AUTO: 191 THOU/MM3 (ref 130–400)
PMV BLD AUTO: 10.5 FL (ref 9.4–12.4)
POTASSIUM SERPL-SCNC: 3.9 MEQ/L (ref 3.5–5.2)
PROT SERPL-MCNC: 7 G/DL (ref 6.1–8)
RBC # BLD AUTO: 4.96 MILL/MM3 (ref 4.7–6.1)
SEGMENTED NEUTROPHILS ABSOLUTE COUNT: 4.2 THOU/MM3 (ref 1.8–7.7)
SODIUM SERPL-SCNC: 145 MEQ/L (ref 135–145)
VIT B12 SERPL-MCNC: 492 PG/ML (ref 211–911)
WBC # BLD AUTO: 7.6 THOU/MM3 (ref 4.8–10.8)

## 2023-05-15 PROCEDURE — 85025 COMPLETE CBC W/AUTO DIFF WBC: CPT

## 2023-05-15 PROCEDURE — 82607 VITAMIN B-12: CPT

## 2023-05-15 PROCEDURE — 72080 X-RAY EXAM THORACOLMB 2/> VW: CPT

## 2023-05-15 PROCEDURE — 83735 ASSAY OF MAGNESIUM: CPT

## 2023-05-15 PROCEDURE — 82746 ASSAY OF FOLIC ACID SERUM: CPT

## 2023-05-15 PROCEDURE — 80053 COMPREHEN METABOLIC PANEL: CPT

## 2023-05-15 PROCEDURE — 36415 COLL VENOUS BLD VENIPUNCTURE: CPT

## 2023-06-06 ENCOUNTER — TELEPHONE (OUTPATIENT)
Dept: FAMILY MEDICINE CLINIC | Age: 63
End: 2023-06-06

## 2023-06-06 NOTE — TELEPHONE ENCOUNTER
----- Message from Lluvia Waller MD sent at 6/6/2023  3:55 PM EDT -----  Results are ok. Please let the patient know.

## 2023-06-19 ENCOUNTER — OFFICE VISIT (OUTPATIENT)
Dept: FAMILY MEDICINE CLINIC | Age: 63
End: 2023-06-19
Payer: COMMERCIAL

## 2023-06-19 VITALS
WEIGHT: 285 LBS | RESPIRATION RATE: 14 BRPM | DIASTOLIC BLOOD PRESSURE: 84 MMHG | HEART RATE: 65 BPM | TEMPERATURE: 98.2 F | BODY MASS INDEX: 35.43 KG/M2 | HEIGHT: 75 IN | SYSTOLIC BLOOD PRESSURE: 118 MMHG | OXYGEN SATURATION: 97 %

## 2023-06-19 DIAGNOSIS — B02.29 PHN (POSTHERPETIC NEURALGIA): Primary | ICD-10-CM

## 2023-06-19 PROCEDURE — 99213 OFFICE O/P EST LOW 20 MIN: CPT | Performed by: FAMILY MEDICINE

## 2023-06-19 PROCEDURE — 3078F DIAST BP <80 MM HG: CPT | Performed by: FAMILY MEDICINE

## 2023-06-19 PROCEDURE — 3074F SYST BP LT 130 MM HG: CPT | Performed by: FAMILY MEDICINE

## 2023-06-19 RX ORDER — GABAPENTIN 300 MG/1
CAPSULE ORAL
Qty: 60 CAPSULE | Refills: 2 | Status: SHIPPED | OUTPATIENT
Start: 2023-06-19 | End: 2023-09-19

## 2023-06-20 ASSESSMENT — ENCOUNTER SYMPTOMS
SHORTNESS OF BREATH: 0
COUGH: 0
EYE PAIN: 0
DIARRHEA: 0
SINUS PRESSURE: 0
CONSTIPATION: 0
ABDOMINAL PAIN: 0
SORE THROAT: 0
NAUSEA: 0
RHINORRHEA: 0
ABDOMINAL DISTENTION: 0

## 2023-06-21 NOTE — PROGRESS NOTES
[address] [address] [address]  [address] [facility] [address]  Dept: [phone]  Dept Fax: [phone]  Loc: [phone]  PROGRESS NOTE      VisitDate: 6/19/2023    Grisel Henry is a 59 y.o. male who presents today for:  Chief Complaint   Patient presents with    Herpes Zoster     He has been taking gabapentin for the nerve pain on the left flank but it is not taking the pain  away. He states he had 3 spots on the flank that blistered but went away. He thinks he may have some lymph nodes enlarged in that area. Impression/Plan:  1. PHN (postherpetic neuralgia)      Requested Prescriptions     Signed Prescriptions Disp Refills    gabapentin (NEURONTIN) 300 MG capsule 60 capsule 2     Sig: Take one tab nightly for 3  nights then one bid     No orders of the defined types were placed in this encounter. Subjective:  HPI  Follow-up postherpetic neuralgia. Symptoms are controlled with current medication. Getting burning tingling pains along the course of his previous shingles infection at about T5 on the left there is sensitivity to touch but rash has resolved    Review of Systems   Constitutional:  Negative for appetite change and fever. HENT:  Negative for congestion, ear pain, postnasal drip, rhinorrhea, sinus pressure and sore throat. Eyes:  Negative for pain and visual disturbance. Respiratory:  Negative for cough and shortness of breath. Cardiovascular:  Negative for chest pain. Gastrointestinal:  Negative for abdominal distention, abdominal pain, constipation, diarrhea and nausea. Genitourinary:  Negative for dysuria, frequency and urgency. Musculoskeletal:  Negative for arthralgias. Skin:  Negative for rash. Neurological:  Negative for dizziness.    Past Medical History:   Diagnosis Date    Diabetes mellitus ([facility] [address]     Diabetes mellitus out of control     Glucose intolerance (impaired glucose tolerance)     HTN (hypertension)

## 2023-07-06 ENCOUNTER — OFFICE VISIT (OUTPATIENT)
Dept: INTERNAL MEDICINE CLINIC | Age: 63
End: 2023-07-06
Payer: COMMERCIAL

## 2023-07-06 VITALS — BODY MASS INDEX: 37.22 KG/M2 | WEIGHT: 290 LBS | HEIGHT: 74 IN

## 2023-07-06 DIAGNOSIS — I10 ESSENTIAL HYPERTENSION: Primary | ICD-10-CM

## 2023-07-06 DIAGNOSIS — E03.9 HYPOTHYROIDISM, UNSPECIFIED TYPE: ICD-10-CM

## 2023-07-06 DIAGNOSIS — E11.9 TYPE 2 DIABETES MELLITUS WITHOUT COMPLICATION, UNSPECIFIED WHETHER LONG TERM INSULIN USE (HCC): ICD-10-CM

## 2023-07-06 LAB — HBA1C MFR BLD: 9.5 % (ref 4.3–5.7)

## 2023-07-06 PROCEDURE — 3046F HEMOGLOBIN A1C LEVEL >9.0%: CPT | Performed by: NURSE PRACTITIONER

## 2023-07-06 PROCEDURE — 83037 HB GLYCOSYLATED A1C HOME DEV: CPT | Performed by: NURSE PRACTITIONER

## 2023-07-06 PROCEDURE — 99214 OFFICE O/P EST MOD 30 MIN: CPT | Performed by: NURSE PRACTITIONER

## 2023-07-06 RX ORDER — LISINOPRIL 20 MG/1
20 TABLET ORAL DAILY
Qty: 90 TABLET | Refills: 1 | Status: SHIPPED | OUTPATIENT
Start: 2023-07-06

## 2023-07-06 RX ORDER — HYDROCHLOROTHIAZIDE 25 MG/1
25 TABLET ORAL EVERY MORNING
Qty: 90 TABLET | Refills: 1 | Status: SHIPPED | OUTPATIENT
Start: 2023-07-06

## 2023-07-06 RX ORDER — ATORVASTATIN CALCIUM 40 MG/1
40 TABLET, FILM COATED ORAL DAILY
Qty: 90 TABLET | Refills: 1 | Status: SHIPPED | OUTPATIENT
Start: 2023-07-06

## 2023-07-06 RX ORDER — GLIMEPIRIDE 4 MG/1
8 TABLET ORAL
Qty: 180 TABLET | Refills: 1 | Status: SHIPPED | OUTPATIENT
Start: 2023-07-06

## 2023-07-06 RX ORDER — DULAGLUTIDE 0.75 MG/.5ML
0.75 INJECTION, SOLUTION SUBCUTANEOUS WEEKLY
Qty: 4 ADJUSTABLE DOSE PRE-FILLED PEN SYRINGE | Refills: 0 | Status: SHIPPED | OUTPATIENT
Start: 2023-07-06

## 2023-07-11 ASSESSMENT — ENCOUNTER SYMPTOMS
COUGH: 0
CONSTIPATION: 0
BLOOD IN STOOL: 0
TROUBLE SWALLOWING: 0
SINUS PAIN: 0
VOMITING: 0
NAUSEA: 0
RHINORRHEA: 0
SORE THROAT: 0
WHEEZING: 0
ABDOMINAL DISTENTION: 0
PHOTOPHOBIA: 0
VOICE CHANGE: 1
ABDOMINAL PAIN: 0
SHORTNESS OF BREATH: 0
SINUS PRESSURE: 0
DIARRHEA: 0

## 2023-07-13 DIAGNOSIS — E11.9 TYPE 2 DIABETES MELLITUS WITHOUT COMPLICATION, UNSPECIFIED WHETHER LONG TERM INSULIN USE (HCC): ICD-10-CM

## 2023-07-13 RX ORDER — INSULIN DEGLUDEC INJECTION 100 U/ML
INJECTION, SOLUTION SUBCUTANEOUS
Qty: 5 ADJUSTABLE DOSE PRE-FILLED PEN SYRINGE | Refills: 2 | Status: SHIPPED | OUTPATIENT
Start: 2023-07-13

## 2023-08-08 DIAGNOSIS — E11.9 TYPE 2 DIABETES MELLITUS WITHOUT COMPLICATION, UNSPECIFIED WHETHER LONG TERM INSULIN USE (HCC): ICD-10-CM

## 2023-08-08 RX ORDER — DULAGLUTIDE 0.75 MG/.5ML
INJECTION, SOLUTION SUBCUTANEOUS
Qty: 2 ADJUSTABLE DOSE PRE-FILLED PEN SYRINGE | Refills: 3 | Status: SHIPPED | OUTPATIENT
Start: 2023-08-08

## 2023-09-06 RX ORDER — TAMSULOSIN HYDROCHLORIDE 0.4 MG/1
CAPSULE ORAL
Qty: 90 CAPSULE | Refills: 3 | Status: SHIPPED | OUTPATIENT
Start: 2023-09-06

## 2023-10-09 ENCOUNTER — OFFICE VISIT (OUTPATIENT)
Dept: INTERNAL MEDICINE CLINIC | Age: 63
End: 2023-10-09
Payer: COMMERCIAL

## 2023-10-09 VITALS
WEIGHT: 296 LBS | BODY MASS INDEX: 37.99 KG/M2 | HEART RATE: 60 BPM | SYSTOLIC BLOOD PRESSURE: 140 MMHG | DIASTOLIC BLOOD PRESSURE: 84 MMHG | RESPIRATION RATE: 18 BRPM | HEIGHT: 74 IN

## 2023-10-09 DIAGNOSIS — E11.9 TYPE 2 DIABETES MELLITUS WITHOUT COMPLICATION, UNSPECIFIED WHETHER LONG TERM INSULIN USE (HCC): Primary | ICD-10-CM

## 2023-10-09 DIAGNOSIS — E11.9 TYPE 2 DIABETES MELLITUS WITHOUT COMPLICATION, UNSPECIFIED WHETHER LONG TERM INSULIN USE (HCC): ICD-10-CM

## 2023-10-09 DIAGNOSIS — R19.00 ABDOMINAL SWELLING: ICD-10-CM

## 2023-10-09 DIAGNOSIS — I10 ESSENTIAL HYPERTENSION: ICD-10-CM

## 2023-10-09 LAB — HBA1C MFR BLD: 9.2 % (ref 4.3–5.7)

## 2023-10-09 PROCEDURE — 3046F HEMOGLOBIN A1C LEVEL >9.0%: CPT | Performed by: NURSE PRACTITIONER

## 2023-10-09 PROCEDURE — 83036 HEMOGLOBIN GLYCOSYLATED A1C: CPT | Performed by: NURSE PRACTITIONER

## 2023-10-09 PROCEDURE — 3078F DIAST BP <80 MM HG: CPT | Performed by: NURSE PRACTITIONER

## 2023-10-09 PROCEDURE — 3074F SYST BP LT 130 MM HG: CPT | Performed by: NURSE PRACTITIONER

## 2023-10-09 PROCEDURE — 99214 OFFICE O/P EST MOD 30 MIN: CPT | Performed by: NURSE PRACTITIONER

## 2023-10-09 RX ORDER — INSULIN DEGLUDEC INJECTION 100 U/ML
INJECTION, SOLUTION SUBCUTANEOUS
Qty: 5 ADJUSTABLE DOSE PRE-FILLED PEN SYRINGE | Refills: 2 | Status: CANCELLED | OUTPATIENT
Start: 2023-10-09

## 2023-10-09 RX ORDER — OMEPRAZOLE 40 MG/1
40 CAPSULE, DELAYED RELEASE ORAL
Qty: 90 CAPSULE | Refills: 5 | Status: SHIPPED | OUTPATIENT
Start: 2023-10-09 | End: 2023-10-09 | Stop reason: SDUPTHER

## 2023-10-09 RX ORDER — INSULIN DEGLUDEC INJECTION 100 U/ML
INJECTION, SOLUTION SUBCUTANEOUS
Qty: 5 ADJUSTABLE DOSE PRE-FILLED PEN SYRINGE | Refills: 2 | Status: SHIPPED | OUTPATIENT
Start: 2023-10-09 | End: 2023-10-09 | Stop reason: SDUPTHER

## 2023-10-09 RX ORDER — INSULIN DEGLUDEC INJECTION 100 U/ML
INJECTION, SOLUTION SUBCUTANEOUS
Qty: 15 ADJUSTABLE DOSE PRE-FILLED PEN SYRINGE | Refills: 2 | Status: SHIPPED | OUTPATIENT
Start: 2023-10-09 | End: 2023-10-11 | Stop reason: SDUPTHER

## 2023-10-09 RX ORDER — OMEPRAZOLE 40 MG/1
40 CAPSULE, DELAYED RELEASE ORAL
Qty: 90 CAPSULE | Refills: 5 | Status: SHIPPED | OUTPATIENT
Start: 2023-10-09

## 2023-10-09 RX ORDER — HYDROCHLOROTHIAZIDE 25 MG/1
25 TABLET ORAL EVERY MORNING
Qty: 90 TABLET | Refills: 1 | Status: SHIPPED | OUTPATIENT
Start: 2023-10-09

## 2023-10-09 RX ORDER — ATORVASTATIN CALCIUM 40 MG/1
40 TABLET, FILM COATED ORAL DAILY
Qty: 90 TABLET | Refills: 1 | Status: SHIPPED | OUTPATIENT
Start: 2023-10-09

## 2023-10-09 RX ORDER — DULAGLUTIDE 1.5 MG/.5ML
1.5 INJECTION, SOLUTION SUBCUTANEOUS WEEKLY
Qty: 4 ADJUSTABLE DOSE PRE-FILLED PEN SYRINGE | Refills: 0 | Status: SHIPPED | OUTPATIENT
Start: 2023-10-09

## 2023-10-09 NOTE — PROGRESS NOTES
[address] INTERNAL MEDICINE AND MEDICATION MANAGEMENT  [address] [facility]  [address] [facility] [address]  Dept: [phone]  Dept Fax: [phone]: [phone]     Visit Date:  10/9/2023    Patient:  Geetha Martinez  YOB: 1960    HPI:     Chief Complaint   Patient presents with    Diabetes    Hypertension     Geetha Martinez (:  1960) is a 62 y.o. male, here for evaluation of the following medical concerns: diabetes, chest pain/decreased energy/fatigue/weight loss/dizziness     I last seen Partha Ward 3 months ago. He follows routinely with Dr. Venice Mario. Heart catheterization with Dr. Carlos Alcocer within 1 year, 50% coronary artery stenosis. No stents needed. He has had some episodes of SVT. One requiring hospitalization at [facility]. He had an ablation  with Dr. Tristan Hamlin. Was at [facility] yesterday due to chest pain, diaphoresis, radiation to back. ACS ruled out. BP today in office 140/84     Dr. Venice Mario tried gabapentin- caused dizziness, urinary incontinence - still having pain across left chest - thinking nerve pain     Nancy Seble - recently - multiple erosions- omeprazole      Diabetes-  Diagnosed 7-8 years ago. He states his diabetes is not well controlled. Unable to obtain A1C today in office 9.2%, was 9.5% previously. Is on metformin 1000 mg BID, glimepiride 4 mg BID, and Tresiba 50 units BID. Also taking trulicity 1.5 mg weekly. He did not tolerate Invokana, caused increased urination. not much benefit with [place] and [place]. States that he misses oral medications occasionally. Is on lisinopril 20 mg daily, with last urinary microalbumin/crt ratio unable to be calculated- <30 in 2023. States hypoglycemic events not present. Is able to voice signs/symptoms of hypoglycemia as dizziness and diaphoresis. Reports checking glucose 1 time per day, with range of glucose readings 100-250 per meter download.  Attempting dietary modification for diabetes management and/or

## 2023-10-10 RX ORDER — FINASTERIDE 5 MG/1
5 TABLET, FILM COATED ORAL DAILY
Qty: 30 TABLET | Refills: 3 | Status: SHIPPED | OUTPATIENT
Start: 2023-10-10

## 2023-10-10 ASSESSMENT — ENCOUNTER SYMPTOMS
SHORTNESS OF BREATH: 0
VOMITING: 0
WHEEZING: 0
RHINORRHEA: 0
ABDOMINAL PAIN: 0
BLOOD IN STOOL: 0
SORE THROAT: 0
SINUS PAIN: 0
DIARRHEA: 0
NAUSEA: 0
ABDOMINAL DISTENTION: 0
CONSTIPATION: 0
VOICE CHANGE: 1
SINUS PRESSURE: 0
TROUBLE SWALLOWING: 0
COUGH: 0
PHOTOPHOBIA: 0

## 2023-10-11 DIAGNOSIS — E11.9 TYPE 2 DIABETES MELLITUS WITHOUT COMPLICATION, UNSPECIFIED WHETHER LONG TERM INSULIN USE (HCC): ICD-10-CM

## 2023-10-11 RX ORDER — INSULIN DEGLUDEC INJECTION 100 U/ML
INJECTION, SOLUTION SUBCUTANEOUS
Qty: 15 ADJUSTABLE DOSE PRE-FILLED PEN SYRINGE | Refills: 2 | Status: SHIPPED | OUTPATIENT
Start: 2023-10-11

## 2023-10-16 ENCOUNTER — TELEPHONE (OUTPATIENT)
Dept: INTERNAL MEDICINE CLINIC | Age: 63
End: 2023-10-16

## 2023-11-01 ENCOUNTER — HOSPITAL ENCOUNTER (OUTPATIENT)
Dept: CT IMAGING | Age: 63
Discharge: HOME | End: 2023-11-01
Payer: COMMERCIAL

## 2023-11-01 DIAGNOSIS — R19.00 ABDOMINAL SWELLING: ICD-10-CM

## 2023-11-01 LAB — POC CREATININE WHOLE BLOOD: 1.1 MG/DL (ref 0.5–1.2)

## 2023-11-01 PROCEDURE — 82565 ASSAY OF CREATININE: CPT

## 2023-11-01 PROCEDURE — 6360000004 HC RX CONTRAST MEDICATION: Performed by: NURSE PRACTITIONER

## 2023-11-01 PROCEDURE — 74177 CT ABD & PELVIS W/CONTRAST: CPT

## 2023-11-01 RX ADMIN — IOPAMIDOL 85 ML: 755 INJECTION, SOLUTION INTRAVENOUS at 08:27

## 2023-11-02 DIAGNOSIS — K76.0 HEPATIC STEATOSIS: Primary | ICD-10-CM

## 2023-11-02 RX ORDER — FINASTERIDE 5 MG/1
5 TABLET, FILM COATED ORAL DAILY
Qty: 30 TABLET | Refills: 3 | Status: SHIPPED | OUTPATIENT
Start: 2023-11-02

## 2023-11-10 RX ORDER — FINASTERIDE 5 MG/1
5 TABLET, FILM COATED ORAL DAILY
Qty: 90 TABLET | Refills: 1 | OUTPATIENT
Start: 2023-11-10

## 2023-12-12 ENCOUNTER — OFFICE VISIT (OUTPATIENT)
Dept: UROLOGY | Age: 63
End: 2023-12-12
Payer: COMMERCIAL

## 2023-12-12 VITALS — WEIGHT: 305 LBS | HEIGHT: 74 IN | BODY MASS INDEX: 39.14 KG/M2 | RESPIRATION RATE: 16 BRPM

## 2023-12-12 DIAGNOSIS — N52.9 ERECTILE DYSFUNCTION, UNSPECIFIED ERECTILE DYSFUNCTION TYPE: ICD-10-CM

## 2023-12-12 DIAGNOSIS — N50.811 RIGHT TESTICULAR PAIN: ICD-10-CM

## 2023-12-12 DIAGNOSIS — N40.1 BENIGN LOCALIZED PROSTATIC HYPERPLASIA WITH LOWER URINARY TRACT SYMPTOMS (LUTS): Primary | ICD-10-CM

## 2023-12-12 PROCEDURE — 99214 OFFICE O/P EST MOD 30 MIN: CPT | Performed by: UROLOGY

## 2023-12-12 NOTE — PROGRESS NOTES
SINDI Gonsalez MD        [address] [facility].  [address] [place] [address] [address]  [facility] [address]  Dept: [phone]  Dept Fax: [phone]: [address] [facility] Urology Office Note -     Patient:  Michael Obrien  YOB: 1960  Date: 12/12/2023    The patient is a 59 y.o. male who presents today for evaluation of the following problems:   Chief Complaint   Patient presents with    Urinary Frequency     Been coming on for months now, about 4 months to point every hours on the hour especially through the night-per patient. Other     Sometimes has odor to urine but not consistant. referred/consultation requested by Eric Ambrosio MD.    HISTORY OF PRESENT ILLNESS:     Right testicular pain  resolved    ED  Has discussed trimix with dr Carlos A Bermudez in past but never started. Has been prescribed caverject in the past    LUTS  Stopped sanctura do to side effect profile  oab symptoms worsening  Flomax not helping  Very bohtered  Auass 25- UNHAPPY        Requested/reviewed records from Eric Ambrosio MD office and/or outside physician/EMR    (Patient's old records have been requested, reviewed and pertinent findings summarized in today's note.)    Procedures Today: N/A      Last several PSA's:  Lab Results   Component Value Date    PSA 0.35 11/19/2018    PSA 0.35 06/22/2017    PSA 0.3 05/06/2016       Last total testosterone:  No results found for: \"TESTOSTERONE\"    Urinalysis today:  No results found for this visit on 12/12/23. Last BUN and creatinine:  Lab Results   Component Value Date    BUN 15 05/15/2023     Lab Results   Component Value Date    CREATININE 0.9 05/15/2023         Imaging Reviewed during this Office Visit:   Cherise Lopes MD independently reviewed the images and verified the radiology reports from:    No results found.     PAST MEDICAL, FAMILY AND SOCIAL HISTORY:  Past Medical History:   Diagnosis Date    Diabetes mellitus

## 2023-12-13 DIAGNOSIS — E03.9 HYPOTHYROIDISM, UNSPECIFIED TYPE: ICD-10-CM

## 2023-12-13 LAB — PSA, ULTRASENSITIVE: 0.22 NG/ML

## 2023-12-14 DIAGNOSIS — E11.9 TYPE 2 DIABETES MELLITUS WITHOUT COMPLICATION, UNSPECIFIED WHETHER LONG TERM INSULIN USE (HCC): ICD-10-CM

## 2023-12-14 RX ORDER — LISINOPRIL 20 MG/1
20 TABLET ORAL DAILY
Qty: 90 TABLET | Refills: 1 | Status: SHIPPED | OUTPATIENT
Start: 2023-12-14

## 2024-01-09 ENCOUNTER — PROCEDURE VISIT (OUTPATIENT)
Dept: UROLOGY | Age: 64
End: 2024-01-09
Payer: COMMERCIAL

## 2024-01-09 VITALS — HEIGHT: 74 IN | WEIGHT: 298 LBS | BODY MASS INDEX: 38.24 KG/M2 | RESPIRATION RATE: 16 BRPM

## 2024-01-09 DIAGNOSIS — R35.0 URINARY FREQUENCY: Primary | ICD-10-CM

## 2024-01-09 DIAGNOSIS — N40.1 BENIGN LOCALIZED PROSTATIC HYPERPLASIA WITH LOWER URINARY TRACT SYMPTOMS (LUTS): ICD-10-CM

## 2024-01-09 DIAGNOSIS — N50.811 RIGHT TESTICULAR PAIN: ICD-10-CM

## 2024-01-09 PROCEDURE — 52000 CYSTOURETHROSCOPY: CPT | Performed by: UROLOGY

## 2024-01-09 NOTE — PROGRESS NOTES
Cystoscopy    Operative Note    Patient:  Robbie Kolb  MRN: [id number]  YOB: 1960    Date: 01/09/24  Surgeon: SINDI ORONA MD  Anesthesia: Urojet Local  Indications:     Needs new PSA      Right epididymitis- no longer an issue If it recurs, will start motrin/doxy and get scrotal u/s  ED- not at treatment goal. Will order trimix in future, which he will come back to be taught hola melgar  bph/Frequency- not at goal. stopped sanctura due to dry mouth. Flomax also not helping      Cysto in office for worsening MOURA      Position: Supine  EBL: 0 ml    Findings:   The patient was prepped and draped in the usual sterile fashion.  The flexible cystoscope was advanced through the urethra and into the bladder.  The bladder was thoroughly inspected and the following was noted:    Residual Urine: moderate.  Urine clear, with no obvious infection  Urethra: No abnormalities of the urethra are noted. Urethral dilation was not performed.    Prostate: lateral lobe hypertrophy + present, prostate moderately obstructing, intravesical extension of prostate not present. There was no previous TURP defect.   Bladder: no tumor noted .   Mild trabeculation noted.  no bladder diverticulum.  Ureters: Orifices with normal configuration and location.      The cystoscope was removed.  The patient tolerated the procedure well.  On prostate supplement which is helping  Cont flomax  F/u ramón for trimix and bph check. He is a candidate for urolift (15) or pvp (20)

## 2024-01-30 ENCOUNTER — OFFICE VISIT (OUTPATIENT)
Dept: UROLOGY | Age: 64
End: 2024-01-30

## 2024-01-30 VITALS — WEIGHT: 298 LBS | RESPIRATION RATE: 15 BRPM | BODY MASS INDEX: 38.24 KG/M2 | HEIGHT: 74 IN

## 2024-01-30 DIAGNOSIS — N52.9 ERECTILE DYSFUNCTION, UNSPECIFIED ERECTILE DYSFUNCTION TYPE: Primary | ICD-10-CM

## 2024-02-01 ENCOUNTER — OFFICE VISIT (OUTPATIENT)
Dept: INTERNAL MEDICINE CLINIC | Age: 64
End: 2024-02-01
Payer: COMMERCIAL

## 2024-02-01 VITALS
SYSTOLIC BLOOD PRESSURE: 142 MMHG | HEART RATE: 60 BPM | RESPIRATION RATE: 20 BRPM | HEIGHT: 74 IN | BODY MASS INDEX: 39.01 KG/M2 | DIASTOLIC BLOOD PRESSURE: 88 MMHG | WEIGHT: 304 LBS

## 2024-02-01 DIAGNOSIS — E11.9 TYPE 2 DIABETES MELLITUS WITHOUT COMPLICATION, UNSPECIFIED WHETHER LONG TERM INSULIN USE (HCC): Primary | ICD-10-CM

## 2024-02-01 DIAGNOSIS — E03.9 HYPOTHYROIDISM, UNSPECIFIED TYPE: ICD-10-CM

## 2024-02-01 DIAGNOSIS — I10 ESSENTIAL HYPERTENSION: ICD-10-CM

## 2024-02-01 DIAGNOSIS — Z13.9 SCREENING DUE: ICD-10-CM

## 2024-02-01 LAB
HBA1C MFR BLD: 8.9 %
HBA1C MFR BLD: 8.9 % (ref 4.3–5.7)

## 2024-02-01 PROCEDURE — 93000 ELECTROCARDIOGRAM COMPLETE: CPT | Performed by: NURSE PRACTITIONER

## 2024-02-01 PROCEDURE — 3079F DIAST BP 80-89 MM HG: CPT | Performed by: NURSE PRACTITIONER

## 2024-02-01 PROCEDURE — G2211 COMPLEX E/M VISIT ADD ON: HCPCS | Performed by: NURSE PRACTITIONER

## 2024-02-01 PROCEDURE — 3052F HG A1C>EQUAL 8.0%<EQUAL 9.0%: CPT | Performed by: NURSE PRACTITIONER

## 2024-02-01 PROCEDURE — 3077F SYST BP >= 140 MM HG: CPT | Performed by: NURSE PRACTITIONER

## 2024-02-01 PROCEDURE — 99214 OFFICE O/P EST MOD 30 MIN: CPT | Performed by: NURSE PRACTITIONER

## 2024-02-01 PROCEDURE — 83036 HEMOGLOBIN GLYCOSYLATED A1C: CPT | Performed by: NURSE PRACTITIONER

## 2024-02-01 RX ORDER — BLOOD SUGAR DIAGNOSTIC
STRIP MISCELLANEOUS
Qty: 200 STRIP | Refills: 3 | Status: SHIPPED | OUTPATIENT
Start: 2024-02-01

## 2024-02-01 RX ORDER — ACYCLOVIR 400 MG/1
400 TABLET ORAL 2 TIMES DAILY
Qty: 10 TABLET | Refills: 0 | Status: SHIPPED | OUTPATIENT
Start: 2024-02-01 | End: 2024-02-06

## 2024-02-01 RX ORDER — HYDROCHLOROTHIAZIDE 25 MG/1
25 TABLET ORAL EVERY MORNING
Qty: 90 TABLET | Refills: 1 | Status: SHIPPED | OUTPATIENT
Start: 2024-02-01

## 2024-02-01 RX ORDER — FLURBIPROFEN SODIUM 0.3 MG/ML
SOLUTION/ DROPS OPHTHALMIC
Qty: 100 EACH | Refills: 3 | Status: SHIPPED | OUTPATIENT
Start: 2024-02-01

## 2024-02-01 RX ORDER — FINASTERIDE 5 MG/1
5 TABLET, FILM COATED ORAL DAILY
Qty: 30 TABLET | Refills: 3 | Status: SHIPPED | OUTPATIENT
Start: 2024-02-01

## 2024-02-01 RX ORDER — LISINOPRIL 20 MG/1
20 TABLET ORAL DAILY
Qty: 90 TABLET | Refills: 1 | Status: SHIPPED | OUTPATIENT
Start: 2024-02-01

## 2024-02-01 RX ORDER — GLIMEPIRIDE 4 MG/1
8 TABLET ORAL
Qty: 180 TABLET | Refills: 1 | Status: SHIPPED | OUTPATIENT
Start: 2024-02-01

## 2024-02-01 RX ORDER — LIDOCAINE 5% 5 G/100G
1 CREAM TOPICAL 2 TIMES DAILY
Qty: 30 G | Refills: 0 | Status: SHIPPED | OUTPATIENT
Start: 2024-02-01

## 2024-02-01 RX ORDER — INSULIN DEGLUDEC INJECTION 100 U/ML
INJECTION, SOLUTION SUBCUTANEOUS
Qty: 15 ADJUSTABLE DOSE PRE-FILLED PEN SYRINGE | Refills: 2 | Status: SHIPPED | OUTPATIENT
Start: 2024-02-01

## 2024-02-01 RX ORDER — OMEPRAZOLE 40 MG/1
40 CAPSULE, DELAYED RELEASE ORAL
Qty: 90 CAPSULE | Refills: 5 | Status: SHIPPED | OUTPATIENT
Start: 2024-02-01

## 2024-02-01 NOTE — PROGRESS NOTES
(Hb A1C)  - blood glucose test strips (ONETOUCH ULTRA) strip; USE TO TEST TWICE A DAY  Dispense: 200 strip; Refill: 3  - glimepiride (AMARYL) 4 MG tablet; Take 2 tablets by mouth every morning (before breakfast)  Dispense: 180 tablet; Refill: 1  - Insulin Degludec (TRESIBA FLEXTOUCH) 100 UNIT/ML SOPN; INJECT 70 UNITS INTO THE SKIN 2 TIMES DAILY  Dispense: 15 Adjustable Dose Pre-filled Pen Syringe; Refill: 2  - metFORMIN (GLUCOPHAGE) 1000 MG tablet; Take 1 tablet by mouth 2 times daily (with meals)  Dispense: 180 tablet; Refill: 1  - Insulin Pen Needle (B-D UF III MINI PEN NEEDLES) 31G X 5 MM MISC; USE AS DIRECTED TWICE A DAY  Dispense: 100 each; Refill: 3    2. Essential hypertension    - hydroCHLOROthiazide (HYDRODIURIL) 25 MG tablet; Take 1 tablet by mouth every morning  Dispense: 90 tablet; Refill: 1    3. Hypothyroidism, unspecified type    - lisinopril (PRINIVIL;ZESTRIL) 20 MG tablet; Take 1 tablet by mouth daily  Dispense: 90 tablet; Refill: 1    4. Screening due    - EKG 12 Lead - Clinic Performed      Return in about 6 months (around 8/1/2024).    Patient given educational materials - see patient instructions.  Discussed use, benefit, and side effects of prescribed medications.  All patient questions answered.  Pt voiced understanding.  Reviewed health maintenance.       Electronically signed LIZZ Carter CNP on 2/1/24 at 3:30 PM EST

## 2024-02-02 NOTE — PROGRESS NOTES
Patient presented to the office for OV without sample Trimix vial. No charge. Patient rescheduled.

## 2024-03-26 DIAGNOSIS — E11.9 TYPE 2 DIABETES MELLITUS WITHOUT COMPLICATION, UNSPECIFIED WHETHER LONG TERM INSULIN USE (HCC): ICD-10-CM

## 2024-03-26 RX ORDER — INSULIN DEGLUDEC 100 U/ML
INJECTION, SOLUTION SUBCUTANEOUS
Qty: 15 ADJUSTABLE DOSE PRE-FILLED PEN SYRINGE | Refills: 2 | Status: SHIPPED | OUTPATIENT
Start: 2024-03-26

## 2024-05-10 DIAGNOSIS — E11.9 TYPE 2 DIABETES MELLITUS WITHOUT COMPLICATION, UNSPECIFIED WHETHER LONG TERM INSULIN USE (HCC): ICD-10-CM

## 2024-05-10 RX ORDER — FINASTERIDE 5 MG/1
5 TABLET, FILM COATED ORAL DAILY
Qty: 90 TABLET | Refills: 1 | Status: SHIPPED | OUTPATIENT
Start: 2024-05-10

## 2024-05-10 RX ORDER — ATORVASTATIN CALCIUM 40 MG/1
40 TABLET, FILM COATED ORAL DAILY
Qty: 90 TABLET | Refills: 1 | Status: SHIPPED | OUTPATIENT
Start: 2024-05-10

## 2024-06-01 DIAGNOSIS — E11.9 TYPE 2 DIABETES MELLITUS WITHOUT COMPLICATION, UNSPECIFIED WHETHER LONG TERM INSULIN USE (HCC): ICD-10-CM

## 2024-06-03 RX ORDER — INSULIN DEGLUDEC 100 U/ML
INJECTION, SOLUTION SUBCUTANEOUS
Qty: 5 ADJUSTABLE DOSE PRE-FILLED PEN SYRINGE | Refills: 2 | Status: SHIPPED | OUTPATIENT
Start: 2024-06-03

## 2024-06-05 DIAGNOSIS — E03.9 HYPOTHYROIDISM, UNSPECIFIED TYPE: ICD-10-CM

## 2024-06-05 DIAGNOSIS — I10 ESSENTIAL HYPERTENSION: ICD-10-CM

## 2024-06-05 DIAGNOSIS — E11.9 TYPE 2 DIABETES MELLITUS WITHOUT COMPLICATION, UNSPECIFIED WHETHER LONG TERM INSULIN USE (HCC): ICD-10-CM

## 2024-06-05 NOTE — TELEPHONE ENCOUNTER
Last visit- 2/1/2024  Next visit- 6/20/2024    Requested Prescriptions     Pending Prescriptions Disp Refills    hydroCHLOROthiazide (HYDRODIURIL) 25 MG tablet 90 tablet 1     Sig: Take 1 tablet by mouth every morning    omeprazole (PRILOSEC) 40 MG delayed release capsule 90 capsule 5     Sig: Take 1 capsule by mouth every morning (before breakfast)    lisinopril (PRINIVIL;ZESTRIL) 20 MG tablet 90 tablet 1     Sig: Take 1 tablet by mouth daily    finasteride (PROSCAR) 5 MG tablet 90 tablet 1     Sig: Take 1 tablet by mouth daily    Insulin Pen Needle (B-D UF III MINI PEN NEEDLES) 31G X 5 MM MISC 100 each 3     Sig: USE AS DIRECTED TWICE A DAY

## 2024-06-11 ENCOUNTER — OFFICE VISIT (OUTPATIENT)
Dept: UROLOGY | Age: 64
End: 2024-06-11
Payer: COMMERCIAL

## 2024-06-11 VITALS — BODY MASS INDEX: 38.84 KG/M2 | WEIGHT: 302.6 LBS | RESPIRATION RATE: 16 BRPM | HEIGHT: 74 IN

## 2024-06-11 DIAGNOSIS — N52.9 ERECTILE DYSFUNCTION, UNSPECIFIED ERECTILE DYSFUNCTION TYPE: Primary | ICD-10-CM

## 2024-06-11 DIAGNOSIS — N40.1 BENIGN LOCALIZED PROSTATIC HYPERPLASIA WITH LOWER URINARY TRACT SYMPTOMS (LUTS): ICD-10-CM

## 2024-06-11 PROCEDURE — 99214 OFFICE O/P EST MOD 30 MIN: CPT | Performed by: UROLOGY

## 2024-06-11 RX ORDER — MIRABEGRON 50 MG/1
50 TABLET, FILM COATED, EXTENDED RELEASE ORAL DAILY
Qty: 30 TABLET | Refills: 3 | Status: SHIPPED | OUTPATIENT
Start: 2024-06-11 | End: 2024-06-24 | Stop reason: SDUPTHER

## 2024-06-13 RX ORDER — OMEPRAZOLE 40 MG/1
40 CAPSULE, DELAYED RELEASE ORAL
Qty: 90 CAPSULE | Refills: 5 | Status: SHIPPED | OUTPATIENT
Start: 2024-06-13

## 2024-06-13 RX ORDER — FINASTERIDE 5 MG/1
5 TABLET, FILM COATED ORAL DAILY
Qty: 90 TABLET | Refills: 1 | Status: SHIPPED | OUTPATIENT
Start: 2024-06-13

## 2024-06-13 RX ORDER — HYDROCHLOROTHIAZIDE 25 MG/1
25 TABLET ORAL EVERY MORNING
Qty: 90 TABLET | Refills: 1 | Status: SHIPPED | OUTPATIENT
Start: 2024-06-13

## 2024-06-13 RX ORDER — LISINOPRIL 20 MG/1
20 TABLET ORAL DAILY
Qty: 90 TABLET | Refills: 1 | Status: SHIPPED | OUTPATIENT
Start: 2024-06-13

## 2024-06-13 RX ORDER — FLURBIPROFEN SODIUM 0.3 MG/ML
SOLUTION/ DROPS OPHTHALMIC
Qty: 100 EACH | Refills: 3 | Status: SHIPPED | OUTPATIENT
Start: 2024-06-13

## 2024-06-20 ENCOUNTER — OFFICE VISIT (OUTPATIENT)
Dept: INTERNAL MEDICINE CLINIC | Age: 64
End: 2024-06-20
Payer: COMMERCIAL

## 2024-06-20 VITALS
HEIGHT: 74 IN | WEIGHT: 302 LBS | DIASTOLIC BLOOD PRESSURE: 83 MMHG | HEART RATE: 76 BPM | SYSTOLIC BLOOD PRESSURE: 136 MMHG | RESPIRATION RATE: 16 BRPM | BODY MASS INDEX: 38.76 KG/M2

## 2024-06-20 DIAGNOSIS — I10 ESSENTIAL HYPERTENSION: ICD-10-CM

## 2024-06-20 DIAGNOSIS — E11.9 TYPE 2 DIABETES MELLITUS WITHOUT COMPLICATION, UNSPECIFIED WHETHER LONG TERM INSULIN USE (HCC): Primary | ICD-10-CM

## 2024-06-20 DIAGNOSIS — E03.9 HYPOTHYROIDISM, UNSPECIFIED TYPE: ICD-10-CM

## 2024-06-20 LAB — HBA1C MFR BLD: 9.4 % (ref 4.3–5.7)

## 2024-06-20 PROCEDURE — 3046F HEMOGLOBIN A1C LEVEL >9.0%: CPT | Performed by: NURSE PRACTITIONER

## 2024-06-20 PROCEDURE — 99214 OFFICE O/P EST MOD 30 MIN: CPT | Performed by: NURSE PRACTITIONER

## 2024-06-20 PROCEDURE — 3079F DIAST BP 80-89 MM HG: CPT | Performed by: NURSE PRACTITIONER

## 2024-06-20 PROCEDURE — 3075F SYST BP GE 130 - 139MM HG: CPT | Performed by: NURSE PRACTITIONER

## 2024-06-20 PROCEDURE — 83036 HEMOGLOBIN GLYCOSYLATED A1C: CPT | Performed by: NURSE PRACTITIONER

## 2024-06-20 RX ORDER — INSULIN ASPART INJECTION 100 [IU]/ML
3 INJECTION, SOLUTION SUBCUTANEOUS
Qty: 5 ADJUSTABLE DOSE PRE-FILLED PEN SYRINGE | Refills: 0 | Status: SHIPPED | OUTPATIENT
Start: 2024-06-20 | End: 2024-06-24 | Stop reason: SDUPTHER

## 2024-06-20 RX ORDER — INSULIN DEGLUDEC 100 U/ML
60 INJECTION, SOLUTION SUBCUTANEOUS 2 TIMES DAILY
Qty: 5 ADJUSTABLE DOSE PRE-FILLED PEN SYRINGE | Refills: 2 | Status: SHIPPED | OUTPATIENT
Start: 2024-06-20 | End: 2024-06-24 | Stop reason: SDUPTHER

## 2024-06-20 RX ORDER — ISOSORBIDE MONONITRATE 30 MG/1
30 TABLET, EXTENDED RELEASE ORAL DAILY
COMMUNITY

## 2024-06-20 RX ORDER — GLIMEPIRIDE 4 MG/1
8 TABLET ORAL
Qty: 180 TABLET | Refills: 1 | Status: SHIPPED | OUTPATIENT
Start: 2024-06-20 | End: 2024-06-24 | Stop reason: SDUPTHER

## 2024-06-20 SDOH — ECONOMIC STABILITY: FOOD INSECURITY: WITHIN THE PAST 12 MONTHS, YOU WORRIED THAT YOUR FOOD WOULD RUN OUT BEFORE YOU GOT MONEY TO BUY MORE.: NEVER TRUE

## 2024-06-20 SDOH — ECONOMIC STABILITY: INCOME INSECURITY: HOW HARD IS IT FOR YOU TO PAY FOR THE VERY BASICS LIKE FOOD, HOUSING, MEDICAL CARE, AND HEATING?: NOT HARD AT ALL

## 2024-06-20 SDOH — ECONOMIC STABILITY: FOOD INSECURITY: WITHIN THE PAST 12 MONTHS, THE FOOD YOU BOUGHT JUST DIDN'T LAST AND YOU DIDN'T HAVE MONEY TO GET MORE.: NEVER TRUE

## 2024-06-20 NOTE — PROGRESS NOTES
[facility]  [address] [facility]  [address]  [facility] [address]  Dept: [phone]  Dept Fax: [phone]  Loc: [phone]     Visit Date:  2024    Patient:  Robbie Kolb  YOB: 1960    HPI:     Chief Complaint   Patient presents with    Diabetes     Robbie Kolb (:  1960) is a 60 y.o. male, here for evaluation of the following medical concerns: diabetes, chest pain/decreased energy/fatigue/weight loss/dizziness     I last seen Robbie 4 months ago. He follows routinely with Dr. Delgado.      Heart catheterization with Dr. Hunt within 1 year, 50% coronary artery stenosis. No stents needed. He has had some episodes of SVT. One requiring hospitalization at [facility].  He had an ablation  with Dr. Gustafson. Was at [facility] prior to last visit due to chest pain, diaphoresis, radiation to back. ACS ruled out. BP today in office 136/83. DOES HAVE A LITTLE TWINGE- NOT AS MUCH AS BEFORE      He continues to report a burning sensation to left chest with radiation around the back side and down into his flank area. He reports that he has had similar flare ups in the past and was attributed to shingles, although he has never had a rash. He is taking OTC naproxen and it is not helpful. Was prescribed gabapentin in the past but he did not tolerate it.      Balwinder - recently - multiple erosions- omeprazole      Diabetes-  Diagnosed 7-8 years ago. He states his diabetes is not well controlled. A1C today in office 9.4%, was 8.9% previously. Is on metformin 1000 mg BID, glimepiride 4 mg BID, and Tresiba 60 units BID. He did not tolerate Invokana, caused increased urination. not much benefit with januvia. States that he misses oral medications occasionally. Is on lisinopril 20 mg daily, with last urinary microalbumin/crt ratio unable to be calculated- <30 in 2023. States hypoglycemic events not present. Is able to voice

## 2024-06-24 ENCOUNTER — TELEPHONE (OUTPATIENT)
Dept: INTERNAL MEDICINE CLINIC | Age: 64
End: 2024-06-24

## 2024-06-24 DIAGNOSIS — I10 ESSENTIAL HYPERTENSION: ICD-10-CM

## 2024-06-24 DIAGNOSIS — E11.9 TYPE 2 DIABETES MELLITUS WITHOUT COMPLICATION, UNSPECIFIED WHETHER LONG TERM INSULIN USE (HCC): ICD-10-CM

## 2024-06-24 DIAGNOSIS — E03.9 HYPOTHYROIDISM, UNSPECIFIED TYPE: ICD-10-CM

## 2024-06-24 RX ORDER — INSULIN DEGLUDEC 100 U/ML
60 INJECTION, SOLUTION SUBCUTANEOUS 2 TIMES DAILY
Qty: 5 ADJUSTABLE DOSE PRE-FILLED PEN SYRINGE | Refills: 2 | Status: SHIPPED | OUTPATIENT
Start: 2024-06-24

## 2024-06-24 RX ORDER — INSULIN ASPART INJECTION 100 [IU]/ML
3 INJECTION, SOLUTION SUBCUTANEOUS
Qty: 5 ADJUSTABLE DOSE PRE-FILLED PEN SYRINGE | Refills: 0 | Status: SHIPPED | OUTPATIENT
Start: 2024-06-24

## 2024-06-24 RX ORDER — FLURBIPROFEN SODIUM 0.3 MG/ML
SOLUTION/ DROPS OPHTHALMIC
Qty: 100 EACH | Refills: 3 | Status: SHIPPED | OUTPATIENT
Start: 2024-06-24

## 2024-06-24 RX ORDER — OMEPRAZOLE 40 MG/1
40 CAPSULE, DELAYED RELEASE ORAL
Qty: 90 CAPSULE | Refills: 5 | Status: SHIPPED | OUTPATIENT
Start: 2024-06-24

## 2024-06-24 RX ORDER — GLIMEPIRIDE 4 MG/1
8 TABLET ORAL
Qty: 180 TABLET | Refills: 1 | Status: SHIPPED | OUTPATIENT
Start: 2024-06-24

## 2024-06-24 RX ORDER — LISINOPRIL 20 MG/1
20 TABLET ORAL DAILY
Qty: 90 TABLET | Refills: 1 | Status: SHIPPED | OUTPATIENT
Start: 2024-06-24

## 2024-06-24 RX ORDER — MIRABEGRON 50 MG/1
50 TABLET, EXTENDED RELEASE ORAL DAILY
Qty: 30 TABLET | Refills: 3 | Status: SHIPPED | OUTPATIENT
Start: 2024-06-24

## 2024-06-24 RX ORDER — FINASTERIDE 5 MG/1
5 TABLET, FILM COATED ORAL DAILY
Qty: 90 TABLET | Refills: 1 | Status: SHIPPED | OUTPATIENT
Start: 2024-06-24

## 2024-06-24 RX ORDER — HYDROCHLOROTHIAZIDE 25 MG/1
25 TABLET ORAL EVERY MORNING
Qty: 90 TABLET | Refills: 1 | Status: SHIPPED | OUTPATIENT
Start: 2024-06-24

## 2024-06-27 ASSESSMENT — ENCOUNTER SYMPTOMS
VOMITING: 0
COUGH: 0
CONSTIPATION: 0
BLOOD IN STOOL: 0
WHEEZING: 0
SHORTNESS OF BREATH: 0
VOICE CHANGE: 1
TROUBLE SWALLOWING: 0
SINUS PRESSURE: 0
ABDOMINAL PAIN: 0
SORE THROAT: 0
PHOTOPHOBIA: 0
DIARRHEA: 0
RHINORRHEA: 0
NAUSEA: 0
SINUS PAIN: 0
ABDOMINAL DISTENTION: 0

## 2024-09-11 ENCOUNTER — OFFICE VISIT (OUTPATIENT)
Dept: UROLOGY | Age: 64
End: 2024-09-11
Payer: COMMERCIAL

## 2024-09-11 VITALS — WEIGHT: 308.2 LBS | RESPIRATION RATE: 16 BRPM | HEIGHT: 74 IN | BODY MASS INDEX: 39.55 KG/M2

## 2024-09-11 DIAGNOSIS — N32.81 OAB (OVERACTIVE BLADDER): ICD-10-CM

## 2024-09-11 DIAGNOSIS — N40.1 BENIGN LOCALIZED PROSTATIC HYPERPLASIA WITH LOWER URINARY TRACT SYMPTOMS (LUTS): Primary | ICD-10-CM

## 2024-09-11 DIAGNOSIS — N52.9 ERECTILE DYSFUNCTION, UNSPECIFIED ERECTILE DYSFUNCTION TYPE: ICD-10-CM

## 2024-09-11 LAB
BILIRUBIN, URINE: NEGATIVE
BLOOD URINE, POC: NEGATIVE
CHARACTER, URINE: CLEAR
COLOR, UA: YELLOW
GLUCOSE URINE: 500 MG/DL
KETONES, URINE: 15
LEUKOCYTE CLUMPS, URINE: NEGATIVE
NITRITE, URINE: NEGATIVE
PH, URINE: 5.5 (ref 5–9)
POST VOID RESIDUAL (PVR): 34 ML
PROTEIN, URINE: NEGATIVE MG/DL
SPECIFIC GRAVITY UA: >= 1.03 (ref 1–1.03)
UROBILINOGEN, URINE: 0.2 EU/DL (ref 0–1)

## 2024-09-11 PROCEDURE — 99214 OFFICE O/P EST MOD 30 MIN: CPT

## 2024-09-11 PROCEDURE — 51798 US URINE CAPACITY MEASURE: CPT

## 2024-09-11 PROCEDURE — 81003 URINALYSIS AUTO W/O SCOPE: CPT

## 2024-09-11 RX ORDER — MIRABEGRON 50 MG/1
50 TABLET, EXTENDED RELEASE ORAL DAILY
Qty: 90 TABLET | Refills: 3 | Status: SHIPPED | OUTPATIENT
Start: 2024-09-11

## 2024-10-07 ENCOUNTER — OFFICE VISIT (OUTPATIENT)
Dept: FAMILY MEDICINE CLINIC | Age: 64
End: 2024-10-07
Payer: COMMERCIAL

## 2024-10-07 VITALS
OXYGEN SATURATION: 95 % | WEIGHT: 294 LBS | DIASTOLIC BLOOD PRESSURE: 70 MMHG | TEMPERATURE: 97.8 F | BODY MASS INDEX: 37.73 KG/M2 | SYSTOLIC BLOOD PRESSURE: 120 MMHG | RESPIRATION RATE: 16 BRPM | HEART RATE: 49 BPM

## 2024-10-07 DIAGNOSIS — R05.1 ACUTE COUGH: Primary | ICD-10-CM

## 2024-10-07 DIAGNOSIS — J06.0 ACUTE LARYNGOPHARYNGITIS: ICD-10-CM

## 2024-10-07 DIAGNOSIS — L98.9 SKIN LESIONS, GENERALIZED: ICD-10-CM

## 2024-10-07 LAB
Lab: NORMAL
QC PASS/FAIL: NORMAL
SARS-COV-2 RDRP RESP QL NAA+PROBE: NEGATIVE

## 2024-10-07 PROCEDURE — 3074F SYST BP LT 130 MM HG: CPT | Performed by: NURSE PRACTITIONER

## 2024-10-07 PROCEDURE — 87635 SARS-COV-2 COVID-19 AMP PRB: CPT | Performed by: NURSE PRACTITIONER

## 2024-10-07 PROCEDURE — 99214 OFFICE O/P EST MOD 30 MIN: CPT | Performed by: NURSE PRACTITIONER

## 2024-10-07 PROCEDURE — 3078F DIAST BP <80 MM HG: CPT | Performed by: NURSE PRACTITIONER

## 2024-10-07 RX ORDER — AMOXICILLIN 250 MG/1
250 CAPSULE ORAL 3 TIMES DAILY
COMMUNITY
Start: 2024-09-25

## 2024-10-07 ASSESSMENT — ENCOUNTER SYMPTOMS
ROS SKIN COMMENTS: SKIN LESIONS
SORE THROAT: 1
EYE REDNESS: 0
EYE DISCHARGE: 0
BACK PAIN: 0
NAUSEA: 0
SHORTNESS OF BREATH: 0
RHINORRHEA: 0
VOMITING: 0
CONSTIPATION: 0
ALLERGIC/IMMUNOLOGIC NEGATIVE: 1
COUGH: 1
DIARRHEA: 0
TROUBLE SWALLOWING: 0
EYE PAIN: 0
WHEEZING: 0
ABDOMINAL PAIN: 0

## 2024-10-07 ASSESSMENT — PATIENT HEALTH QUESTIONNAIRE - PHQ9
SUM OF ALL RESPONSES TO PHQ QUESTIONS 1-9: 0
1. LITTLE INTEREST OR PLEASURE IN DOING THINGS: NOT AT ALL
SUM OF ALL RESPONSES TO PHQ9 QUESTIONS 1 & 2: 0
2. FEELING DOWN, DEPRESSED OR HOPELESS: NOT AT ALL

## 2024-10-07 NOTE — PROGRESS NOTES
rhinorrhea.      Mouth/Throat:      Mouth: Mucous membranes are moist.      Dentition: Normal dentition.      Pharynx: Uvula midline. Pharyngeal swelling and posterior oropharyngeal erythema present. No oropharyngeal exudate or uvula swelling.      Tonsils: No tonsillar exudate. 0 on the right. 0 on the left.   Eyes:      General: Lids are normal.         Right eye: No discharge.         Left eye: No discharge.      Conjunctiva/sclera: Conjunctivae normal.      Right eye: Right conjunctiva is not injected. No chemosis.     Left eye: No chemosis.     Pupils: Pupils are equal, round, and reactive to light.   Neck:      Vascular: No JVD.      Trachea: Trachea normal.   Cardiovascular:      Rate and Rhythm: Normal rate and regular rhythm.      Heart sounds: Normal heart sounds. No murmur heard.  Pulmonary:      Effort: Pulmonary effort is normal. No respiratory distress.      Breath sounds: Normal breath sounds. No stridor. No wheezing.   Abdominal:      General: Bowel sounds are normal. There is no distension.      Palpations: Abdomen is soft.      Tenderness: There is no abdominal tenderness.   Musculoskeletal:         General: No tenderness or signs of injury. Normal range of motion.      Cervical back: Full passive range of motion without pain and normal range of motion.   Lymphadenopathy:      Cervical: No cervical adenopathy.   Skin:     General: Skin is warm and dry.      Capillary Refill: Capillary refill takes less than 2 seconds.      Findings: No rash.      Comments: Patient has multiple nevi and lesions on his back and it is very freckled.   Neurological:      Mental Status: He is alert and oriented to person, place, and time.      GCS: GCS eye subscore is 4. GCS verbal subscore is 5. GCS motor subscore is 6.      Cranial Nerves: No cranial nerve deficit.      Coordination: Coordination normal.      Gait: Gait normal.   Psychiatric:         Attention and Perception: Attention and perception normal.

## 2024-10-14 RX ORDER — TAMSULOSIN HYDROCHLORIDE 0.4 MG/1
CAPSULE ORAL
Qty: 90 CAPSULE | Refills: 3 | Status: SHIPPED | OUTPATIENT
Start: 2024-10-14

## 2024-10-14 NOTE — TELEPHONE ENCOUNTER
Robbie Kolb called requesting a refill on the following medications:  Requested Prescriptions     Pending Prescriptions Disp Refills    tamsulosin (FLOMAX) 0.4 MG capsule [Pharmacy Med Name: TAMSULOSIN HCL 0.4 MG CAPSULE] 90 capsule 3     Sig: TAKE 1 CAPSULE BY MOUTH EVERY DAY     Pharmacy verified:  [email or web address]      Date of last visit: 09/11/2024  Date of next visit (if applicable): 11/20/2024

## 2024-10-21 ENCOUNTER — OFFICE VISIT (OUTPATIENT)
Dept: INTERNAL MEDICINE CLINIC | Age: 64
End: 2024-10-21
Payer: COMMERCIAL

## 2024-10-21 VITALS
DIASTOLIC BLOOD PRESSURE: 76 MMHG | HEIGHT: 74 IN | HEART RATE: 54 BPM | WEIGHT: 309 LBS | SYSTOLIC BLOOD PRESSURE: 156 MMHG | BODY MASS INDEX: 39.66 KG/M2 | RESPIRATION RATE: 18 BRPM

## 2024-10-21 DIAGNOSIS — E11.9 TYPE 2 DIABETES MELLITUS WITHOUT COMPLICATION, UNSPECIFIED WHETHER LONG TERM INSULIN USE (HCC): ICD-10-CM

## 2024-10-21 DIAGNOSIS — R07.89 CHEST TIGHTNESS: Primary | ICD-10-CM

## 2024-10-21 PROCEDURE — 93000 ELECTROCARDIOGRAM COMPLETE: CPT | Performed by: NURSE PRACTITIONER

## 2024-10-21 PROCEDURE — 3078F DIAST BP <80 MM HG: CPT | Performed by: NURSE PRACTITIONER

## 2024-10-21 PROCEDURE — 3077F SYST BP >= 140 MM HG: CPT | Performed by: NURSE PRACTITIONER

## 2024-10-21 PROCEDURE — 99214 OFFICE O/P EST MOD 30 MIN: CPT | Performed by: NURSE PRACTITIONER

## 2024-10-21 PROCEDURE — 3046F HEMOGLOBIN A1C LEVEL >9.0%: CPT | Performed by: NURSE PRACTITIONER

## 2024-10-21 PROCEDURE — 83036 HEMOGLOBIN GLYCOSYLATED A1C: CPT | Performed by: NURSE PRACTITIONER

## 2024-10-21 RX ORDER — INSULIN ASPART INJECTION 100 [IU]/ML
3 INJECTION, SOLUTION SUBCUTANEOUS
Qty: 5 ADJUSTABLE DOSE PRE-FILLED PEN SYRINGE | Refills: 0 | Status: CANCELLED | OUTPATIENT
Start: 2024-10-21

## 2024-10-21 RX ORDER — INSULIN DEGLUDEC 100 U/ML
60 INJECTION, SOLUTION SUBCUTANEOUS 2 TIMES DAILY
Qty: 5 ADJUSTABLE DOSE PRE-FILLED PEN SYRINGE | Refills: 2 | Status: SHIPPED | OUTPATIENT
Start: 2024-10-21

## 2024-10-21 RX ORDER — FLURBIPROFEN SODIUM 0.3 MG/ML
SOLUTION/ DROPS OPHTHALMIC
Qty: 100 EACH | Refills: 3 | Status: SHIPPED | OUTPATIENT
Start: 2024-10-21

## 2024-10-21 RX ORDER — DOXYCYCLINE HYCLATE 100 MG
100 TABLET ORAL 2 TIMES DAILY
Qty: 14 TABLET | Refills: 0 | Status: SHIPPED | OUTPATIENT
Start: 2024-10-21 | End: 2024-10-28

## 2024-10-21 RX ORDER — INSULIN ASPART 100 [IU]/ML
3 INJECTION, SOLUTION INTRAVENOUS; SUBCUTANEOUS
Qty: 5 ADJUSTABLE DOSE PRE-FILLED PEN SYRINGE | Refills: 3 | Status: SHIPPED | OUTPATIENT
Start: 2024-10-21

## 2024-10-21 NOTE — PROGRESS NOTES
[facility]  [address] [facility]  [address]  [facility] [address]  Dept: [phone]  Dept Fax: [phone]  Loc: [phone]     Visit Date:  10/21/2024    Patient:  Robbie Kolb  YOB: 1960    HPI:     Chief Complaint   Patient presents with    Diabetes     Robbie Kolb (:  1960) is a 60 y.o. male, here for evaluation of the following medical concerns: diabetes, chest pain/decreased energy/fatigue/weight loss/dizziness     I last seen Robbie 4 months ago. He follows routinely with Dr. Delgado.      Heart catheterization with Dr. Hunt within 1 year, 50% coronary artery stenosis. No stents needed. He has had some episodes of SVT. One requiring hospitalization at [facility].  He had an ablation  with Dr. Gustafson. Was at [facility] prior to last visit due to chest pain, diaphoresis, radiation to back. ACS ruled out. BP today in office 156/76.      He continues to report a burning sensation to left chest with radiation around the back side and down into his flank area. He reports that he has had similar flare ups in the past and was attributed to shingles, although he has never had a rash. He is taking OTC naproxen and it is not helpful. Was prescribed gabapentin in the past but he did not tolerate it. Has followed with GI since last visit, Dr. Britt. States that he has multiple erosions and was started on omeprazole.      Diabetes-  Diagnosed 7-8 years ago. He states his diabetes is not well controlled. A1C today in office 9.5%, was 9.4% previously. Is on metformin 1000 mg BID, glimepiride 4 mg BID, and Tresiba 60 units BID. He did not tolerate Invokana, caused increased urination. not much benefit with januvia. States that he misses oral medications occasionally. Is on lisinopril 20 mg daily, with last urinary microalbumin/crt ratio unable to be calculated- <30 in 2023. States hypoglycemic events not present.

## 2024-10-24 NOTE — TELEPHONE ENCOUNTER
Spoke with the pt and he voiced he understood recommendations and sent referral to [address] [facility] office they [facility]  and will contact pt for appointment Cardiology Pulmonology Pulmonology Pulmonology Cardiology Cardiology

## 2024-10-31 ASSESSMENT — ENCOUNTER SYMPTOMS
SINUS PAIN: 0
ABDOMINAL PAIN: 0
TROUBLE SWALLOWING: 0
BLOOD IN STOOL: 0
PHOTOPHOBIA: 0
ABDOMINAL DISTENTION: 0
DIARRHEA: 0
VOICE CHANGE: 1
NAUSEA: 0
COUGH: 0
CONSTIPATION: 0
VOMITING: 0
SORE THROAT: 0
SHORTNESS OF BREATH: 0
RHINORRHEA: 0
SINUS PRESSURE: 0
WHEEZING: 0

## 2024-11-20 ENCOUNTER — OFFICE VISIT (OUTPATIENT)
Dept: UROLOGY | Age: 64
End: 2024-11-20
Payer: COMMERCIAL

## 2024-11-20 VITALS — WEIGHT: 305 LBS | RESPIRATION RATE: 18 BRPM | BODY MASS INDEX: 39.14 KG/M2 | HEIGHT: 74 IN

## 2024-11-20 DIAGNOSIS — B35.6 JOCK ITCH: Primary | ICD-10-CM

## 2024-11-20 DIAGNOSIS — N52.9 ERECTILE DYSFUNCTION, UNSPECIFIED ERECTILE DYSFUNCTION TYPE: ICD-10-CM

## 2024-11-20 DIAGNOSIS — N40.1 BENIGN LOCALIZED PROSTATIC HYPERPLASIA WITH LOWER URINARY TRACT SYMPTOMS (LUTS): ICD-10-CM

## 2024-11-20 PROCEDURE — 54235 NJX CORPORA CAVERNOSA RX AGT: CPT

## 2024-11-20 PROCEDURE — 99212 OFFICE O/P EST SF 10 MIN: CPT

## 2024-11-20 RX ORDER — NYSTATIN AND TRIAMCINOLONE ACETONIDE 100000; 1 [USP'U]/G; MG/G
CREAM TOPICAL
Qty: 60 G | Refills: 2 | Status: SHIPPED | OUTPATIENT
Start: 2024-11-20

## 2024-11-20 NOTE — PROGRESS NOTES
to achieve desired benefit. Patient voices understanding. All questions and concerns addressed at time of appointment.      BPH w/ LUTS  OAB  - Myrbetriq 50 mg helping. IPSS of 15/4. Emptying well. PVR of 34 mL.    - Continue Myrbetriq.  - PSA due in 6 months.    4.   Tinea Cruris  - Ongoing for years. Has been applying Nystatin per PCP for this. Now out. Refills sent.    *Follow-up in 6 months for symptom check and PSA review.    Perry Gr PA-C  Urology

## 2024-11-20 NOTE — PATIENT INSTRUCTIONS
Follow technique as shown in the office. If any questions, first refer to the handout given, and if unanswered call the office for clarification.      Starting dose of 0.2 mL (20 marker on the syringe). If desired benefit is met at current dose then stay at that dose, if greater benefit desired then increase in increments of 0.05 mL (next time try 0.25 mL/25 marker on the syringe). Maximum dose is 0.5 mL (50 marker).

## 2024-11-21 LAB — PSA, ULTRASENSITIVE: 0.09 NG/ML (ref 0–4)

## 2024-12-19 DIAGNOSIS — E11.9 TYPE 2 DIABETES MELLITUS WITHOUT COMPLICATION, UNSPECIFIED WHETHER LONG TERM INSULIN USE (HCC): ICD-10-CM

## 2024-12-19 DIAGNOSIS — N32.81 OAB (OVERACTIVE BLADDER): ICD-10-CM

## 2024-12-19 RX ORDER — ATORVASTATIN CALCIUM 40 MG/1
40 TABLET, FILM COATED ORAL DAILY
Qty: 90 TABLET | Refills: 1 | Status: SHIPPED | OUTPATIENT
Start: 2024-12-19

## 2024-12-19 RX ORDER — FLURBIPROFEN SODIUM 0.3 MG/ML
SOLUTION/ DROPS OPHTHALMIC
Qty: 100 EACH | Refills: 3 | Status: SHIPPED | OUTPATIENT
Start: 2024-12-19

## 2024-12-19 RX ORDER — MIRABEGRON 50 MG/1
50 TABLET, FILM COATED, EXTENDED RELEASE ORAL DAILY
Qty: 90 TABLET | Refills: 1 | Status: SHIPPED | OUTPATIENT
Start: 2024-12-19

## 2024-12-19 NOTE — PROGRESS NOTES
From [facility], taking call for Lam.     Patient requesting new Rx for pen needles.     At last OV 10/21 Tresiba was added and he has been going through needles faster.     Pharmacy confirmed.

## 2025-01-27 ENCOUNTER — OFFICE VISIT (OUTPATIENT)
Dept: INTERNAL MEDICINE CLINIC | Age: 65
End: 2025-01-27

## 2025-01-27 VITALS
HEART RATE: 75 BPM | DIASTOLIC BLOOD PRESSURE: 86 MMHG | WEIGHT: 309 LBS | RESPIRATION RATE: 20 BRPM | BODY MASS INDEX: 39.66 KG/M2 | SYSTOLIC BLOOD PRESSURE: 154 MMHG | HEIGHT: 74 IN

## 2025-01-27 DIAGNOSIS — R53.1 WEAKNESS: ICD-10-CM

## 2025-01-27 DIAGNOSIS — E11.9 TYPE 2 DIABETES MELLITUS WITHOUT COMPLICATION, UNSPECIFIED WHETHER LONG TERM INSULIN USE (HCC): Primary | ICD-10-CM

## 2025-01-27 LAB — HBA1C MFR BLD: 8.1 % (ref 4.3–5.7)

## 2025-01-27 ASSESSMENT — PATIENT HEALTH QUESTIONNAIRE - PHQ9
SUM OF ALL RESPONSES TO PHQ QUESTIONS 1-9: 3
SUM OF ALL RESPONSES TO PHQ9 QUESTIONS 1 & 2: 0
SUM OF ALL RESPONSES TO PHQ QUESTIONS 1-9: 3
8. MOVING OR SPEAKING SO SLOWLY THAT OTHER PEOPLE COULD HAVE NOTICED. OR THE OPPOSITE, BEING SO FIGETY OR RESTLESS THAT YOU HAVE BEEN MOVING AROUND A LOT MORE THAN USUAL: NEARLY EVERY DAY
9. THOUGHTS THAT YOU WOULD BE BETTER OFF DEAD, OR OF HURTING YOURSELF: NOT AT ALL
1. LITTLE INTEREST OR PLEASURE IN DOING THINGS: NOT AT ALL
2. FEELING DOWN, DEPRESSED OR HOPELESS: NOT AT ALL
10. IF YOU CHECKED OFF ANY PROBLEMS, HOW DIFFICULT HAVE THESE PROBLEMS MADE IT FOR YOU TO DO YOUR WORK, TAKE CARE OF THINGS AT HOME, OR GET ALONG WITH OTHER PEOPLE: SOMEWHAT DIFFICULT
5. POOR APPETITE OR OVEREATING: NOT AT ALL
6. FEELING BAD ABOUT YOURSELF - OR THAT YOU ARE A FAILURE OR HAVE LET YOURSELF OR YOUR FAMILY DOWN: NOT AT ALL
SUM OF ALL RESPONSES TO PHQ QUESTIONS 1-9: 3
SUM OF ALL RESPONSES TO PHQ QUESTIONS 1-9: 3
4. FEELING TIRED OR HAVING LITTLE ENERGY: NOT AT ALL
3. TROUBLE FALLING OR STAYING ASLEEP: NOT AT ALL
7. TROUBLE CONCENTRATING ON THINGS, SUCH AS READING THE NEWSPAPER OR WATCHING TELEVISION: NOT AT ALL

## 2025-01-27 NOTE — PROGRESS NOTES
Patient updated PHQ-9 depression screening.  Score:3  Patient identifies the following of moving/ speaking so slowly / so fidgety that others have noticed.  Provider updated  Paper copy scanned into chart.  
States hypoglycemic events not present. Is able to voice signs/symptoms of hypoglycemia as dizziness and diaphoresis. Reports checking glucose 1 time per day, with range of glucose readings  per meter download. Attempting dietary modification for diabetes management and/or weight loss. Currently follows with diabetes clinic, dietician, CDE. Does report difficulty with obtaining medications. Follows with Dr. Garner annually. BP reccs <140/90 (<130/80 in CVD risk). On aspirin 81 mg daily and atorvastatin 40 mg daily.      Denies polyuria, polydipsia, polyphagia. Reports occasionally neuropathic symptoms including numbness, tingling. Does not have any wounds to feet.        Is following with urology for urinary issues and ED. Still having nocturia. Urinating 4+ times per night.       Complains of hoarseness, voice change for the passed 6 months. Does not smoke currently, has a 12 year 0.5 PPD smoke history. TSH WNL. He is following with ENT    Also reports increased leg weakness progressively over the last several months. Offered PT, but patient declines currently. No other neuro deficits reported or noted on exam.      Medications    Current Outpatient Medications:     mirabegron (MYRBETRIQ) 50 MG TB24, TAKE 1 TABLET BY MOUTH DAILY, Disp: 90 tablet, Rfl: 1    atorvastatin (LIPITOR) 40 MG tablet, TAKE 1 TABLET BY MOUTH EVERY DAY, Disp: 90 tablet, Rfl: 1    Insulin Pen Needle (B-D UF III MINI PEN NEEDLES) 31G X 5 MM MISC, USE AS DIRECTED TWICE A DAY, Disp: 100 each, Rfl: 3    nystatin-triamcinolone (MYCOLOG II) 744763-1.1 UNIT/GM-% cream, Apply topically 2 times daily PRN for jock itch., Disp: 60 g, Rfl: 2    Insulin Degludec (TRESIBA FLEXTOUCH) 100 UNIT/ML SOPN, Inject 60 Units into the skin in the morning and at bedtime, Disp: 5 Adjustable Dose Pre-filled Pen Syringe, Rfl: 2    NOVOLOG FLEXPEN 100 UNIT/ML injection pen, Inject 3 Units into the skin Daily with supper, Disp: 5 Adjustable Dose Pre-filled Pen

## 2025-01-28 ASSESSMENT — ENCOUNTER SYMPTOMS
COUGH: 0
WHEEZING: 0
SORE THROAT: 0
SINUS PAIN: 0
TROUBLE SWALLOWING: 0
DIARRHEA: 0
RHINORRHEA: 0
SINUS PRESSURE: 0
CONSTIPATION: 0
ABDOMINAL PAIN: 0
VOMITING: 0
VOICE CHANGE: 1
NAUSEA: 0
BLOOD IN STOOL: 0
ABDOMINAL DISTENTION: 0
PHOTOPHOBIA: 0
SHORTNESS OF BREATH: 0

## 2025-04-28 ENCOUNTER — OFFICE VISIT (OUTPATIENT)
Dept: INTERNAL MEDICINE CLINIC | Age: 65
End: 2025-04-28
Payer: COMMERCIAL

## 2025-04-28 VITALS
RESPIRATION RATE: 20 BRPM | BODY MASS INDEX: 36.83 KG/M2 | SYSTOLIC BLOOD PRESSURE: 140 MMHG | DIASTOLIC BLOOD PRESSURE: 80 MMHG | HEART RATE: 66 BPM | WEIGHT: 287 LBS | HEIGHT: 74 IN

## 2025-04-28 DIAGNOSIS — E11.9 TYPE 2 DIABETES MELLITUS WITHOUT COMPLICATION, UNSPECIFIED WHETHER LONG TERM INSULIN USE (HCC): ICD-10-CM

## 2025-04-28 DIAGNOSIS — I10 ESSENTIAL HYPERTENSION: ICD-10-CM

## 2025-04-28 DIAGNOSIS — I49.9 IRREGULAR HEART RATE: Primary | ICD-10-CM

## 2025-04-28 PROCEDURE — 93000 ELECTROCARDIOGRAM COMPLETE: CPT | Performed by: NURSE PRACTITIONER

## 2025-04-28 PROCEDURE — 3077F SYST BP >= 140 MM HG: CPT | Performed by: NURSE PRACTITIONER

## 2025-04-28 PROCEDURE — 3052F HG A1C>EQUAL 8.0%<EQUAL 9.0%: CPT | Performed by: NURSE PRACTITIONER

## 2025-04-28 PROCEDURE — 99214 OFFICE O/P EST MOD 30 MIN: CPT | Performed by: NURSE PRACTITIONER

## 2025-04-28 PROCEDURE — 3079F DIAST BP 80-89 MM HG: CPT | Performed by: NURSE PRACTITIONER

## 2025-04-28 RX ORDER — FLURBIPROFEN SODIUM 0.3 MG/ML
SOLUTION/ DROPS OPHTHALMIC
Qty: 100 EACH | Refills: 3 | Status: SHIPPED | OUTPATIENT
Start: 2025-04-28

## 2025-04-28 RX ORDER — HYDROCHLOROTHIAZIDE 25 MG/1
25 TABLET ORAL EVERY MORNING
Qty: 90 TABLET | Refills: 1 | Status: CANCELLED | OUTPATIENT
Start: 2025-04-28

## 2025-04-28 RX ORDER — LISINOPRIL 20 MG/1
20 TABLET ORAL DAILY
Qty: 90 TABLET | Refills: 1 | Status: SHIPPED | OUTPATIENT
Start: 2025-04-28

## 2025-04-28 RX ORDER — FINASTERIDE 5 MG/1
5 TABLET, FILM COATED ORAL DAILY
Qty: 90 TABLET | Refills: 1 | Status: SHIPPED | OUTPATIENT
Start: 2025-04-28

## 2025-04-28 RX ORDER — INSULIN DEGLUDEC 100 U/ML
60 INJECTION, SOLUTION SUBCUTANEOUS 2 TIMES DAILY
Qty: 5 ADJUSTABLE DOSE PRE-FILLED PEN SYRINGE | Refills: 2 | Status: SHIPPED | OUTPATIENT
Start: 2025-04-28

## 2025-04-28 RX ORDER — INSULIN ASPART 100 [IU]/ML
3 INJECTION, SOLUTION INTRAVENOUS; SUBCUTANEOUS
Qty: 5 ADJUSTABLE DOSE PRE-FILLED PEN SYRINGE | Refills: 3 | Status: SHIPPED | OUTPATIENT
Start: 2025-04-28

## 2025-04-28 RX ORDER — GLIMEPIRIDE 4 MG/1
8 TABLET ORAL
Qty: 180 TABLET | Refills: 1 | Status: SHIPPED | OUTPATIENT
Start: 2025-04-28

## 2025-04-28 SDOH — ECONOMIC STABILITY: FOOD INSECURITY: WITHIN THE PAST 12 MONTHS, YOU WORRIED THAT YOUR FOOD WOULD RUN OUT BEFORE YOU GOT MONEY TO BUY MORE.: NEVER TRUE

## 2025-04-28 SDOH — ECONOMIC STABILITY: FOOD INSECURITY: WITHIN THE PAST 12 MONTHS, THE FOOD YOU BOUGHT JUST DIDN'T LAST AND YOU DIDN'T HAVE MONEY TO GET MORE.: NEVER TRUE

## 2025-04-28 ASSESSMENT — ENCOUNTER SYMPTOMS
RHINORRHEA: 0
PHOTOPHOBIA: 0
BLOOD IN STOOL: 0
SHORTNESS OF BREATH: 0
ABDOMINAL DISTENTION: 0
VOMITING: 0
COUGH: 0
CONSTIPATION: 0
DIARRHEA: 0
NAUSEA: 0
WHEEZING: 0
SORE THROAT: 0
ABDOMINAL PAIN: 0
SINUS PRESSURE: 0
SINUS PAIN: 0
TROUBLE SWALLOWING: 0
VOICE CHANGE: 1

## 2025-04-28 NOTE — PROGRESS NOTES
[facility] PROFESSIONAL [facility]S INTERNAL MEDICINE AND MEDICATION MANAGEMENT  [address]  [address]  [place] [address]  Dept: [phone]  Dept Fax: [phone]  Loc: [phone]     Visit Date:  2025    Patient:  Robbie Kolb  YOB: 1960    HPI:     Chief Complaint   Patient presents with    Diabetes     Robbie Kolb (:  1960) is a 60 y.o. male, here for evaluation of the following medical concerns: diabetes, chest pain/decreased energy/fatigue/weight loss/dizziness, bradycardia     I last seen Robbie 3 months ago. He follows routinely with Dr. Delgado.      Heart catheterization with Dr. Hunt within 1 year, 50% coronary artery stenosis. No stents needed. He has had some episodes of SVT. One requiring hospitalization at [facility].  He had an ablation  with Dr. Gustafson. Was at [facility] prior to last visit due to chest pain, diaphoresis, radiation to back. ACS ruled out. BP today in office 140/80. Bradycardia noted on exam- EKG completed and revealed sinus bradycardia and frequent PAC. Discussed with patient. Will obtain 48 holter.      He continues to report a burning sensation to left chest with radiation around the back side and down into his flank area. He reports that he has had similar flare ups in the past and was attributed to shingles, although he has never had a rash. He is taking OTC naproxen and it is not helpful. Was prescribed gabapentin in the past but he did not tolerate it. Has followed with GI since last visit, Dr. Britt. States that he has multiple erosions and was started on omeprazole.      Diabetes-  Diagnosed 7-8 years ago. He states his diabetes is not well controlled. A1C 8.1% on 25, was 9.5% previously. Is on metformin 1000 mg BID, glimepiride 4 mg BID, Tresiba 60 units BID, and Novolog 3 units with dinner. He did not tolerate Invokana, caused increased urination. not much benefit with januvia. States that he misses oral

## 2025-05-08 ENCOUNTER — HOSPITAL ENCOUNTER (OUTPATIENT)
Dept: CT IMAGING | Age: 65
Discharge: HOME | End: 2025-05-08
Payer: COMMERCIAL

## 2025-05-08 ENCOUNTER — HOSPITAL ENCOUNTER (OUTPATIENT)
Age: 65
Discharge: HOME | End: 2025-05-10
Payer: COMMERCIAL

## 2025-05-08 DIAGNOSIS — I49.9 IRREGULAR HEART RATE: ICD-10-CM

## 2025-05-08 DIAGNOSIS — R07.89 CHEST TIGHTNESS: ICD-10-CM

## 2025-05-08 PROCEDURE — 93225 XTRNL ECG REC<48 HRS REC: CPT

## 2025-05-08 PROCEDURE — 71250 CT THORAX DX C-: CPT

## 2025-05-09 ENCOUNTER — RESULTS FOLLOW-UP (OUTPATIENT)
Dept: INTERNAL MEDICINE CLINIC | Age: 65
End: 2025-05-09

## 2025-05-14 LAB
ACQUISITION DURATION: NORMAL S
AVERAGE HEART RATE: 60 BPM
HOOKUP DATE: NORMAL
HOOKUP TIME: NORMAL
MAX HEART RATE TIME/DATE: NORMAL
MAX HEART RATE: 103 BPM
MIN HEART RATE TIME/DATE: NORMAL
MIN HEART RATE: 47 BPM
NUMBER OF QRS COMPLEXES: NORMAL
NUMBER OF SUPRAVENTRICULAR COUPLETS: 1
NUMBER OF SUPRAVENTRICULAR ECTOPICS: NORMAL
NUMBER OF SUPRAVENTRICULAR ISOLATED BEATS: NORMAL
NUMBER OF VENTRICULAR BIGEMINAL CYCLES: 5
NUMBER OF VENTRICULAR COUPLETS: 21
NUMBER OF VENTRICULAR ECTOPICS: 1251

## 2025-05-15 ENCOUNTER — RESULTS FOLLOW-UP (OUTPATIENT)
Dept: INTERNAL MEDICINE CLINIC | Age: 65
End: 2025-05-15

## 2025-06-11 DIAGNOSIS — E11.9 TYPE 2 DIABETES MELLITUS WITHOUT COMPLICATION, UNSPECIFIED WHETHER LONG TERM INSULIN USE (HCC): ICD-10-CM

## 2025-06-13 RX ORDER — INSULIN DEGLUDEC 100 U/ML
60 INJECTION, SOLUTION SUBCUTANEOUS 2 TIMES DAILY
Qty: 45 ADJUSTABLE DOSE PRE-FILLED PEN SYRINGE | Refills: 4 | Status: SHIPPED | OUTPATIENT
Start: 2025-06-13

## 2025-06-17 ENCOUNTER — OFFICE VISIT (OUTPATIENT)
Dept: FAMILY MEDICINE CLINIC | Age: 65
End: 2025-06-17

## 2025-06-17 VITALS
HEART RATE: 60 BPM | BODY MASS INDEX: 35.64 KG/M2 | RESPIRATION RATE: 18 BRPM | TEMPERATURE: 98.1 F | WEIGHT: 277.7 LBS | SYSTOLIC BLOOD PRESSURE: 138 MMHG | DIASTOLIC BLOOD PRESSURE: 82 MMHG | HEIGHT: 74 IN

## 2025-06-17 DIAGNOSIS — R10.32 LLQ PAIN: ICD-10-CM

## 2025-06-17 DIAGNOSIS — R10.9 ABDOMINAL PAIN, UNSPECIFIED ABDOMINAL LOCATION: Primary | ICD-10-CM

## 2025-06-17 DIAGNOSIS — K57.92 DIVERTICULITIS: ICD-10-CM

## 2025-06-17 LAB
BILIRUBIN, POC: NORMAL
BLOOD URINE, POC: NORMAL
CLARITY, POC: CLEAR
COLOR, POC: YELLOW
GLUCOSE URINE, POC: 100 MG/DL
KETONES, POC: NORMAL MG/DL
LEUKOCYTE EST, POC: NORMAL
NITRITE, POC: NORMAL
PH, POC: 5.5
PROTEIN, POC: NORMAL MG/DL
SPECIFIC GRAVITY, POC: 1.02
UROBILINOGEN, POC: 0.2 MG/DL

## 2025-06-17 RX ORDER — CIPROFLOXACIN 500 MG/1
500 TABLET, FILM COATED ORAL 2 TIMES DAILY
Qty: 20 TABLET | Refills: 0 | Status: SHIPPED | OUTPATIENT
Start: 2025-06-17 | End: 2025-06-27

## 2025-06-17 RX ORDER — METRONIDAZOLE 500 MG/1
500 TABLET ORAL 3 TIMES DAILY
Qty: 30 TABLET | Refills: 0 | Status: SHIPPED | OUTPATIENT
Start: 2025-06-17 | End: 2025-06-27

## 2025-06-17 ASSESSMENT — ENCOUNTER SYMPTOMS
SINUS PRESSURE: 0
SORE THROAT: 0
DIARRHEA: 0
ABDOMINAL PAIN: 1
CONSTIPATION: 0
SHORTNESS OF BREATH: 0
ABDOMINAL DISTENTION: 0
EYE PAIN: 0
COUGH: 0
RHINORRHEA: 0
NAUSEA: 0

## 2025-06-17 NOTE — PROGRESS NOTES
[facility]  [address] [address]  Dept: [phone]  Dept Fax: [phone]  Loc: [phone]  PROGRESS NOTE      VisitDate: 6/17/2025    Robbie Kolb is a 65 y.o. male who presents today for:  Chief Complaint   Patient presents with    Abdominal Pain     Patient has been experiencing left sided abdominal pain and side pain for the past 2 weeks.       Impression/Plan:  1. Abdominal pain, unspecified abdominal location    2. LLQ pain    3. Diverticulitis      Requested Prescriptions     Signed Prescriptions Disp Refills    ciprofloxacin (CIPRO) 500 MG tablet 20 tablet 0     Sig: Take 1 tablet by mouth 2 times daily for 10 days    metroNIDAZOLE (FLAGYL) 500 MG tablet 30 tablet 0     Sig: Take 1 tablet by mouth 3 times daily for 10 days     Orders Placed This Encounter   Procedures    CT ABDOMEN PELVIS W IV CONTRAST Additional Contrast? Oral     Standing Status:   Future     Expected Date:   6/17/2025     Expiration Date:   6/17/2026     Additional Contrast?:   Oral     STAT Creatinine as needed::   No     Reason for exam::   llq pain    Comprehensive Metabolic Panel     Standing Status:   Future     Expected Date:   7/1/2025     Expiration Date:   6/17/2026    CBC with Auto Differential     Standing Status:   Future     Expected Date:   7/1/2025     Expiration Date:   6/17/2026    POCT Urinalysis No Micro (Auto)         Subjective:  History of Present Illness  The patient is a 65-year-old male who presents for evaluation of abdominal pain and seborrheic keratosis.    He reports experiencing significant pain associated with a bulge on his side, which has been present for over a month. The pain is severe enough to disrupt his sleep and is localized to the area of the bulge. He also describes a sensation akin to kidney pain in his back. He reports no recent injuries.     He has noticed a change in his bowel movements, with stools becoming

## 2025-06-19 LAB
ALBUMIN: 4.1 G/DL (ref 3.5–5.2)
ALK PHOSPHATASE: 78 U/L (ref 39–118)
ALT SERPL-CCNC: 21 U/L (ref 5–41)
ANION GAP SERPL CALCULATED.3IONS-SCNC: 17 MMOL/L (ref 7–16)
AST SERPL-CCNC: 22 U/L (ref 9–50)
BASOPHILS ABSOLUTE: 0.06 K/UL (ref 0–0.2)
BASOPHILS RELATIVE PERCENT: 0.8 % (ref 0–2)
BILIRUB SERPL-MCNC: 0.7 MG/DL
BUN BLDV-MCNC: 24 MG/DL (ref 8–23)
CALCIUM SERPL-MCNC: 9.7 MG/DL (ref 8.6–10.5)
CHLORIDE BLD-SCNC: 104 MMOL/L (ref 96–107)
CO2: 25 MMOL/L (ref 18–32)
CREAT SERPL-MCNC: 1.37 MG/DL (ref 0.67–1.3)
EGFR IF NONAFRICAN AMERICAN: 58 ML/MIN/1.73M2
EOSINOPHILS ABSOLUTE: 0.07 K/UL (ref 0–0.8)
EOSINOPHILS RELATIVE PERCENT: 0.9 % (ref 0–5)
GLUCOSE: 91 MG/DL (ref 70–100)
HCT VFR BLD CALC: 45.9 % (ref 39–52)
HEMOGLOBIN: 15.3 G/DL (ref 13–18)
IMMATURE GRANS (ABS): 0.03 K/UL (ref 0–0.06)
IMMATURE GRANULOCYTES %: 0.4 % (ref 0–2)
LYMPHOCYTES ABSOLUTE: 2.28 K/UL (ref 0.9–5.2)
LYMPHOCYTES RELATIVE PERCENT: 29.2 % (ref 20–45)
MCH RBC QN AUTO: 29.3 PG (ref 26–32)
MCHC RBC AUTO-ENTMCNC: 33.3 G/DL (ref 32–35)
MCV RBC AUTO: 88 FL (ref 75–100)
MONOCYTES ABSOLUTE: 0.66 K/UL (ref 0.1–1)
MONOCYTES RELATIVE PERCENT: 8.4 % (ref 0–13)
NEUTROPHILS ABSOLUTE: 4.72 K/UL (ref 1.9–8)
NEUTROPHILS RELATIVE PERCENT: 60.3 % (ref 45–75)
PDW BLD-RTO: 13 % (ref 11.2–14.8)
PLATELET # BLD: 206 THOUS/CMM (ref 140–440)
POTASSIUM SERPL-SCNC: 3.9 MMOL/L (ref 3.5–5.4)
RBC # BLD: 5.22 MILL/CMM (ref 4.4–6.1)
SODIUM BLD-SCNC: 146 MMOL/L (ref 135–148)
TOTAL PROTEIN: 6.8 G/DL (ref 6–8.3)
WBC # BLD: 7.8 THDS/CMM (ref 3.6–11)

## 2025-06-25 ENCOUNTER — TELEPHONE (OUTPATIENT)
Dept: FAMILY MEDICINE CLINIC | Age: 65
End: 2025-06-25

## 2025-06-25 DIAGNOSIS — R56.9 SEIZURE-LIKE ACTIVITY (HCC): Primary | ICD-10-CM
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
## 2025-07-02 ENCOUNTER — TELEPHONE (OUTPATIENT)
Dept: FAMILY MEDICINE CLINIC | Age: 65
End: 2025-07-02

## 2025-07-02 DIAGNOSIS — R56.9 SEIZURE-LIKE ACTIVITY (HCC): Primary | ICD-10-CM

## 2025-07-02 NOTE — TELEPHONE ENCOUNTER
Received the following note from Dr Boo:    Referral reviewed by provider. Unfortunately, we are unable to accept this referral at this time. Due to patient holding 's licence and what it entails he needs input from epilepsy specialist at higher level of care.  Referring provider notified.      Patient would like to try Dr Urbano Ray at [facility] first before going out of town. Referral is faxed with information and they will contact patient. Patient wife was also given phone number to call and check on referral.     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  Referral is faxed to Dr Urbano Ray.

## 2025-07-19 DIAGNOSIS — I10 ESSENTIAL HYPERTENSION: ICD-10-CM

## 2025-07-21 RX ORDER — HYDROCHLOROTHIAZIDE 25 MG/1
25 TABLET ORAL EVERY MORNING
Qty: 90 TABLET | Refills: 1 | Status: SHIPPED | OUTPATIENT
Start: 2025-07-21

## 2025-07-21 RX ORDER — OMEPRAZOLE 40 MG/1
40 CAPSULE, DELAYED RELEASE ORAL
Qty: 90 CAPSULE | Refills: 5 | Status: SHIPPED | OUTPATIENT
Start: 2025-07-21

## 2025-07-25 DIAGNOSIS — E11.9 TYPE 2 DIABETES MELLITUS WITHOUT COMPLICATION, UNSPECIFIED WHETHER LONG TERM INSULIN USE (HCC): ICD-10-CM

## 2025-07-25 RX ORDER — ATORVASTATIN CALCIUM 40 MG/1
40 TABLET, FILM COATED ORAL DAILY
Qty: 90 TABLET | Refills: 1 | Status: SHIPPED | OUTPATIENT
Start: 2025-07-25

## 2025-07-28 ENCOUNTER — OFFICE VISIT (OUTPATIENT)
Age: 65
End: 2025-07-28
Payer: COMMERCIAL

## 2025-07-28 VITALS
BODY MASS INDEX: 34.14 KG/M2 | DIASTOLIC BLOOD PRESSURE: 85 MMHG | HEIGHT: 74 IN | WEIGHT: 266 LBS | TEMPERATURE: 97.1 F | HEART RATE: 58 BPM | SYSTOLIC BLOOD PRESSURE: 153 MMHG

## 2025-07-28 DIAGNOSIS — I47.10 SVT (SUPRAVENTRICULAR TACHYCARDIA): ICD-10-CM

## 2025-07-28 DIAGNOSIS — R52 PAIN: ICD-10-CM

## 2025-07-28 DIAGNOSIS — R79.89 ELEVATED PROLACTIN LEVEL: ICD-10-CM

## 2025-07-28 DIAGNOSIS — R79.89 ELEVATED TROPONIN: ICD-10-CM

## 2025-07-28 DIAGNOSIS — R63.4 WEIGHT LOSS: ICD-10-CM

## 2025-07-28 DIAGNOSIS — R79.89 ELEVATED FERRITIN: ICD-10-CM

## 2025-07-28 DIAGNOSIS — E11.9 TYPE 2 DIABETES MELLITUS WITHOUT COMPLICATION, UNSPECIFIED WHETHER LONG TERM INSULIN USE (HCC): Primary | ICD-10-CM

## 2025-07-28 DIAGNOSIS — R53.83 FATIGUE, UNSPECIFIED TYPE: ICD-10-CM

## 2025-07-28 DIAGNOSIS — I10 ESSENTIAL HYPERTENSION: ICD-10-CM

## 2025-07-28 DIAGNOSIS — R55 SYNCOPE, UNSPECIFIED SYNCOPE TYPE: ICD-10-CM

## 2025-07-28 LAB
ALBUMIN SERPL BCG-MCNC: 4.2 G/DL (ref 3.4–4.9)
ALP SERPL-CCNC: 74 U/L (ref 40–129)
ALT SERPL W/O P-5'-P-CCNC: 43 U/L (ref 10–50)
ANION GAP SERPL CALC-SCNC: 14 MEQ/L (ref 8–16)
AST SERPL-CCNC: 30 U/L (ref 10–50)
BASOPHILS ABSOLUTE: 0 THOU/MM3 (ref 0–0.1)
BASOPHILS NFR BLD AUTO: 0.6 %
BILIRUB SERPL-MCNC: 0.6 MG/DL (ref 0.3–1.2)
BUN SERPL-MCNC: 23 MG/DL (ref 8–23)
CALCIUM SERPL-MCNC: 9.7 MG/DL (ref 8.8–10.2)
CHLORIDE SERPL-SCNC: 107 MEQ/L (ref 98–111)
CO2 SERPL-SCNC: 24 MEQ/L (ref 22–29)
CREAT SERPL-MCNC: 1.1 MG/DL (ref 0.7–1.2)
DEPRECATED RDW RBC AUTO: 40.5 FL (ref 35–45)
EOSINOPHIL NFR BLD AUTO: 1.3 %
EOSINOPHILS ABSOLUTE: 0.1 THOU/MM3 (ref 0–0.4)
ERYTHROCYTE [DISTWIDTH] IN BLOOD BY AUTOMATED COUNT: 12.6 % (ref 11.5–14.5)
ERYTHROCYTE [SEDIMENTATION RATE] IN BLOOD BY WESTERGREN METHOD: 11 MM/HR (ref 0–20)
FERRITIN SERPL IA-MCNC: 526 NG/ML (ref 30–400)
GFR SERPL CREATININE-BSD FRML MDRD: 75 ML/MIN/1.73M2
GLUCOSE SERPL-MCNC: 158 MG/DL (ref 74–109)
HCT VFR BLD AUTO: 42.7 % (ref 42–52)
HGB BLD-MCNC: 14.5 GM/DL (ref 14–18)
IMM GRANULOCYTES # BLD AUTO: 0.03 THOU/MM3 (ref 0–0.07)
IMM GRANULOCYTES NFR BLD AUTO: 0.4 %
IRON SATN MFR SERPL: 41 % (ref 20–50)
IRON SERPL-MCNC: 88 UG/DL (ref 61–157)
LYMPHOCYTES ABSOLUTE: 2.4 THOU/MM3 (ref 1–4.8)
LYMPHOCYTES NFR BLD AUTO: 29.6 %
MCH RBC QN AUTO: 29.8 PG (ref 26–33)
MCHC RBC AUTO-ENTMCNC: 34 GM/DL (ref 32.2–35.5)
MCV RBC AUTO: 87.7 FL (ref 80–94)
MONOCYTES ABSOLUTE: 0.7 THOU/MM3 (ref 0.4–1.3)
MONOCYTES NFR BLD AUTO: 8.4 %
NEUTROPHILS ABSOLUTE: 4.8 THOU/MM3 (ref 1.8–7.7)
NEUTROPHILS NFR BLD AUTO: 59.7 %
NRBC BLD AUTO-RTO: 0 /100 WBC
PLATELET # BLD AUTO: 179 THOU/MM3 (ref 130–400)
PMV BLD AUTO: 10.8 FL (ref 9.4–12.4)
POTASSIUM SERPL-SCNC: 3.8 MEQ/L (ref 3.5–5.2)
PROLACTIN SERPL-MCNC: 15.6 NG/ML
PROT SERPL-MCNC: 7 G/DL (ref 6.4–8.3)
RBC # BLD AUTO: 4.87 MILL/MM3 (ref 4.7–6.1)
RHEUMATOID FACT SERPL-ACNC: < 10 IU/ML (ref 0–13)
SODIUM SERPL-SCNC: 145 MEQ/L (ref 135–145)
TIBC SERPL-MCNC: 213 UG/DL (ref 171–450)
TROPONIN, HIGH SENSITIVITY: 19 NG/L (ref 0–12)
TSH SERPL DL<=0.05 MIU/L-ACNC: 0.93 UIU/ML (ref 0.27–4.2)
WBC # BLD AUTO: 8 THOU/MM3 (ref 4.8–10.8)

## 2025-07-28 PROCEDURE — 99214 OFFICE O/P EST MOD 30 MIN: CPT | Performed by: NURSE PRACTITIONER

## 2025-07-28 PROCEDURE — 83036 HEMOGLOBIN GLYCOSYLATED A1C: CPT | Performed by: NURSE PRACTITIONER

## 2025-07-28 PROCEDURE — 3077F SYST BP >= 140 MM HG: CPT | Performed by: NURSE PRACTITIONER

## 2025-07-28 PROCEDURE — 3052F HG A1C>EQUAL 8.0%<EQUAL 9.0%: CPT | Performed by: NURSE PRACTITIONER

## 2025-07-28 PROCEDURE — 3078F DIAST BP <80 MM HG: CPT | Performed by: NURSE PRACTITIONER

## 2025-07-28 RX ORDER — LEVETIRACETAM 250 MG/1
250 TABLET ORAL EVERY 12 HOURS
COMMUNITY
Start: 2025-07-09

## 2025-07-28 RX ORDER — ISOSORBIDE MONONITRATE 30 MG/1
30 TABLET, EXTENDED RELEASE ORAL DAILY
COMMUNITY
End: 2025-07-28

## 2025-07-28 RX ORDER — ACYCLOVIR 400 MG/1
TABLET ORAL
Qty: 1 EACH | Refills: 0 | Status: SHIPPED | OUTPATIENT
Start: 2025-07-28

## 2025-07-28 RX ORDER — METHOCARBAMOL 500 MG/1
500 TABLET, FILM COATED ORAL EVERY 6 HOURS PRN
COMMUNITY

## 2025-07-28 RX ORDER — ACYCLOVIR 400 MG/1
TABLET ORAL
Qty: 9 EACH | Refills: 2 | Status: SHIPPED | OUTPATIENT
Start: 2025-07-28

## 2025-07-29 LAB
C PEPTIDE SERPL-MCNC: 3.8 NG/ML (ref 1.1–4.4)
CANCER AG125 SERPL-ACNC: 7 U/ML (ref 0–38)

## 2025-08-04 ASSESSMENT — ENCOUNTER SYMPTOMS
VOMITING: 0
TROUBLE SWALLOWING: 0
VOICE CHANGE: 1
WHEEZING: 0
ABDOMINAL PAIN: 0
SINUS PAIN: 0
SHORTNESS OF BREATH: 0
DIARRHEA: 0
COUGH: 0
ABDOMINAL DISTENTION: 0
PHOTOPHOBIA: 0
SORE THROAT: 0
SINUS PRESSURE: 0
NAUSEA: 0
BLOOD IN STOOL: 0
RHINORRHEA: 0
CONSTIPATION: 0

## 2025-08-07 ENCOUNTER — TELEPHONE (OUTPATIENT)
Dept: CARDIOLOGY CLINIC | Age: 65
End: 2025-08-07

## 2025-08-27 ENCOUNTER — TELEPHONE (OUTPATIENT)
Dept: CARDIOLOGY CLINIC | Age: 65
End: 2025-08-27

## 2025-08-27 ENCOUNTER — TELEPHONE (OUTPATIENT)
Dept: ONCOLOGY | Age: 65
End: 2025-08-27